# Patient Record
Sex: FEMALE | Race: WHITE | NOT HISPANIC OR LATINO | URBAN - METROPOLITAN AREA
[De-identification: names, ages, dates, MRNs, and addresses within clinical notes are randomized per-mention and may not be internally consistent; named-entity substitution may affect disease eponyms.]

---

## 2017-07-02 ENCOUNTER — EMERGENCY (EMERGENCY)
Facility: HOSPITAL | Age: 77
LOS: 0 days | Discharge: HOME | End: 2017-07-02
Attending: EMERGENCY MEDICINE | Admitting: EMERGENCY MEDICINE
Payer: MEDICARE

## 2017-07-02 VITALS
OXYGEN SATURATION: 100 % | RESPIRATION RATE: 17 BRPM | HEART RATE: 70 BPM | HEIGHT: 62 IN | WEIGHT: 119.93 LBS | DIASTOLIC BLOOD PRESSURE: 70 MMHG | SYSTOLIC BLOOD PRESSURE: 162 MMHG | TEMPERATURE: 98 F

## 2017-07-02 VITALS — HEART RATE: 62 BPM | SYSTOLIC BLOOD PRESSURE: 140 MMHG | DIASTOLIC BLOOD PRESSURE: 64 MMHG | TEMPERATURE: 98 F

## 2017-07-02 DIAGNOSIS — K92.1 MELENA: ICD-10-CM

## 2017-07-02 DIAGNOSIS — Z98.51 TUBAL LIGATION STATUS: Chronic | ICD-10-CM

## 2017-07-02 DIAGNOSIS — R10.9 UNSPECIFIED ABDOMINAL PAIN: ICD-10-CM

## 2017-07-02 DIAGNOSIS — K92.2 GASTROINTESTINAL HEMORRHAGE, UNSPECIFIED: ICD-10-CM

## 2017-07-02 DIAGNOSIS — R94.31 ABNORMAL ELECTROCARDIOGRAM [ECG] [EKG]: ICD-10-CM

## 2017-07-02 DIAGNOSIS — Z90.710 ACQUIRED ABSENCE OF BOTH CERVIX AND UTERUS: Chronic | ICD-10-CM

## 2017-07-02 LAB
ADD ON TEST-SPECIMEN IN LAB: SIGNIFICANT CHANGE UP
ALBUMIN SERPL ELPH-MCNC: 4.4 G/DL — SIGNIFICANT CHANGE UP (ref 3.3–5)
ALP SERPL-CCNC: 44 U/L — SIGNIFICANT CHANGE UP (ref 40–120)
ALT FLD-CCNC: 27 U/L — SIGNIFICANT CHANGE UP (ref 12–78)
ANION GAP SERPL CALC-SCNC: 12 MMOL/L — SIGNIFICANT CHANGE UP (ref 5–17)
APTT BLD: 26 SEC — LOW (ref 27.5–37.4)
AST SERPL-CCNC: 18 U/L — SIGNIFICANT CHANGE UP (ref 15–37)
BASOPHILS # BLD AUTO: 0 K/UL — SIGNIFICANT CHANGE UP (ref 0–0.2)
BASOPHILS NFR BLD AUTO: 0.3 % — SIGNIFICANT CHANGE UP (ref 0–2)
BILIRUB SERPL-MCNC: 0.3 MG/DL — SIGNIFICANT CHANGE UP (ref 0.2–1.2)
BLD GP AB SCN SERPL QL: SIGNIFICANT CHANGE UP
BUN SERPL-MCNC: 16 MG/DL — SIGNIFICANT CHANGE UP (ref 7–23)
CALCIUM SERPL-MCNC: 9.7 MG/DL — SIGNIFICANT CHANGE UP (ref 8.5–10.1)
CHLORIDE SERPL-SCNC: 98 MMOL/L — SIGNIFICANT CHANGE UP (ref 96–108)
CO2 SERPL-SCNC: 23 MMOL/L — SIGNIFICANT CHANGE UP (ref 22–31)
CREAT SERPL-MCNC: 0.94 MG/DL — SIGNIFICANT CHANGE UP (ref 0.5–1.3)
EOSINOPHIL # BLD AUTO: 0 K/UL — SIGNIFICANT CHANGE UP (ref 0–0.5)
EOSINOPHIL NFR BLD AUTO: 0.1 % — SIGNIFICANT CHANGE UP (ref 0–6)
GLUCOSE SERPL-MCNC: 153 MG/DL — HIGH (ref 70–99)
HCT VFR BLD CALC: 38.8 % — SIGNIFICANT CHANGE UP (ref 34.5–45)
HGB BLD-MCNC: 13.2 G/DL — SIGNIFICANT CHANGE UP (ref 11.5–15.5)
INR BLD: 1.02 RATIO — SIGNIFICANT CHANGE UP (ref 0.88–1.16)
LIDOCAIN IGE QN: 111 U/L — SIGNIFICANT CHANGE UP (ref 73–393)
LYMPHOCYTES # BLD AUTO: 1.3 K/UL — SIGNIFICANT CHANGE UP (ref 1–3.3)
LYMPHOCYTES # BLD AUTO: 9.6 % — LOW (ref 13–44)
MCHC RBC-ENTMCNC: 29.4 PG — SIGNIFICANT CHANGE UP (ref 27–34)
MCHC RBC-ENTMCNC: 34 GM/DL — SIGNIFICANT CHANGE UP (ref 32–36)
MCV RBC AUTO: 86.6 FL — SIGNIFICANT CHANGE UP (ref 80–100)
MONOCYTES # BLD AUTO: 0.4 K/UL — SIGNIFICANT CHANGE UP (ref 0–0.9)
MONOCYTES NFR BLD AUTO: 2.9 % — SIGNIFICANT CHANGE UP (ref 2–14)
NEUTROPHILS # BLD AUTO: 11.7 K/UL — HIGH (ref 1.8–7.4)
NEUTROPHILS NFR BLD AUTO: 87.1 % — HIGH (ref 43–77)
PLATELET # BLD AUTO: 368 K/UL — SIGNIFICANT CHANGE UP (ref 150–400)
POTASSIUM SERPL-MCNC: 3.4 MMOL/L — LOW (ref 3.5–5.3)
POTASSIUM SERPL-SCNC: 3.4 MMOL/L — LOW (ref 3.5–5.3)
PROT SERPL-MCNC: 7.8 GM/DL — SIGNIFICANT CHANGE UP (ref 6–8.3)
PROTHROM AB SERPL-ACNC: 11 SEC — SIGNIFICANT CHANGE UP (ref 9.8–12.7)
RBC # BLD: 4.48 M/UL — SIGNIFICANT CHANGE UP (ref 3.8–5.2)
RBC # FLD: 12.3 % — SIGNIFICANT CHANGE UP (ref 10.3–14.5)
SODIUM SERPL-SCNC: 133 MMOL/L — LOW (ref 135–145)
TROPONIN I SERPL-MCNC: <0.015 NG/ML — SIGNIFICANT CHANGE UP (ref 0.01–0.04)
TYPE + AB SCN PNL BLD: SIGNIFICANT CHANGE UP
WBC # BLD: 13.4 K/UL — HIGH (ref 3.8–10.5)
WBC # FLD AUTO: 13.4 K/UL — HIGH (ref 3.8–10.5)

## 2017-07-02 PROCEDURE — 99284 EMERGENCY DEPT VISIT MOD MDM: CPT | Mod: 25

## 2017-07-02 PROCEDURE — 93010 ELECTROCARDIOGRAM REPORT: CPT

## 2017-07-02 NOTE — ED ADULT NURSE NOTE - OBJECTIVE STATEMENT
Pt states she took a laxative (Ducolax) last night and woke up this morning with abdominal pain and noticed blood in her stool. Pt states she is being followed by a GI doctor for precancerous cells in her colon. Pt is alert and oriented x4. Denies nausea, abd distended and soft

## 2017-07-02 NOTE — ED PROVIDER NOTE - OBJECTIVE STATEMENT
77 yo pt with abdominal pain and blood in stool.  Pt took bisacodyl yesterday, because she felt that she did not fully empty.  over the night and into the morning pt with cramping.  Pt had large BM and notice some bloods streaks.  Pt now feeling better with IV fluid.  Pt not on blood thinner.  No travel, no sick contacts.

## 2017-07-02 NOTE — ED PROVIDER NOTE - CARE PLAN
Principal Discharge DX:	Abdominal pain, unspecified location  Secondary Diagnosis:	Gastrointestinal hemorrhage, unspecified gastrointestinal hemorrhage type

## 2017-07-02 NOTE — ED PROVIDER NOTE - MEDICAL DECISION MAKING DETAILS
75 yo pt with abd cramp after taking bisacodyl for constipation.  pt notice small amounts of blood.  Pt now feeling better after hydration and would like to be d/c.

## 2017-07-02 NOTE — ED ADULT TRIAGE NOTE - CHIEF COMPLAINT QUOTE
pt took a laxative last night and noticed blood in her stool this morning along with abdominal cramping.

## 2017-07-02 NOTE — ED PROVIDER NOTE - PROGRESS NOTE DETAILS
Attending Garcia, reviewed with pt results of labs.  Pt is feeling better - abd soft/nt/nd.  offered pt to check rectal exam and pt declined.  Pt feeling much better and would like to be d./c at this point.  No urinary symptoms.  Will cancel u/a

## 2017-10-05 ENCOUNTER — APPOINTMENT (OUTPATIENT)
Dept: PULMONOLOGY | Facility: CLINIC | Age: 77
End: 2017-10-05
Payer: MEDICARE

## 2017-10-05 VITALS
TEMPERATURE: 98.6 F | SYSTOLIC BLOOD PRESSURE: 140 MMHG | BODY MASS INDEX: 20.8 KG/M2 | HEIGHT: 62 IN | WEIGHT: 113 LBS | HEART RATE: 79 BPM | RESPIRATION RATE: 16 BRPM | DIASTOLIC BLOOD PRESSURE: 90 MMHG

## 2017-10-05 DIAGNOSIS — F51.04 PSYCHOPHYSIOLOGIC INSOMNIA: ICD-10-CM

## 2017-10-05 DIAGNOSIS — F51.01 PRIMARY INSOMNIA: ICD-10-CM

## 2017-10-05 DIAGNOSIS — Z86.79 PERSONAL HISTORY OF OTHER DISEASES OF THE CIRCULATORY SYSTEM: ICD-10-CM

## 2017-10-05 PROCEDURE — 99205 OFFICE O/P NEW HI 60 MIN: CPT | Mod: GC

## 2017-10-05 RX ORDER — TRAZODONE HYDROCHLORIDE 50 MG/1
50 TABLET ORAL
Qty: 30 | Refills: 3 | Status: COMPLETED | COMMUNITY
Start: 2017-10-05 | End: 2017-10-05

## 2017-10-06 PROBLEM — F51.04 CHRONIC INSOMNIA: Status: ACTIVE | Noted: 2017-10-06

## 2017-10-06 RX ORDER — FENOFIBRATE 160 MG/1
160 TABLET ORAL
Qty: 90 | Refills: 0 | Status: COMPLETED | COMMUNITY
Start: 2017-03-20

## 2017-10-06 RX ORDER — ZOLPIDEM TARTRATE 5 MG/1
5 TABLET ORAL
Qty: 30 | Refills: 0 | Status: COMPLETED | COMMUNITY
Start: 2017-08-24

## 2017-10-06 RX ORDER — TEMAZEPAM 30 MG/1
30 CAPSULE ORAL
Qty: 30 | Refills: 0 | Status: COMPLETED | COMMUNITY
Start: 2017-09-12

## 2017-10-06 RX ORDER — QUETIAPINE FUMARATE 25 MG/1
25 TABLET ORAL
Qty: 60 | Refills: 0 | Status: COMPLETED | COMMUNITY
Start: 2017-08-30

## 2017-10-06 RX ORDER — ESZOPICLONE 3 MG/1
3 TABLET, FILM COATED ORAL
Qty: 30 | Refills: 0 | Status: COMPLETED | COMMUNITY
Start: 2017-09-11

## 2017-10-06 RX ORDER — LISINOPRIL 10 MG/1
10 TABLET ORAL
Qty: 30 | Refills: 0 | Status: COMPLETED | COMMUNITY
Start: 2017-08-19

## 2017-10-06 RX ORDER — NITROFURANTOIN (MONOHYDRATE/MACROCRYSTALS) 25; 75 MG/1; MG/1
100 CAPSULE ORAL
Qty: 20 | Refills: 0 | Status: COMPLETED | COMMUNITY
Start: 2017-10-03

## 2017-10-06 RX ORDER — ALPRAZOLAM 0.5 MG/1
0.5 TABLET ORAL
Qty: 30 | Refills: 0 | Status: COMPLETED | COMMUNITY
Start: 2017-08-21

## 2017-11-28 ENCOUNTER — RX RENEWAL (OUTPATIENT)
Age: 77
End: 2017-11-28

## 2017-11-28 RX ORDER — ZOLPIDEM TARTRATE 5 MG/1
5 TABLET ORAL
Qty: 30 | Refills: 2 | Status: DISCONTINUED | COMMUNITY
Start: 2017-10-11 | End: 2017-11-28

## 2017-12-07 ENCOUNTER — APPOINTMENT (OUTPATIENT)
Dept: PULMONOLOGY | Facility: CLINIC | Age: 77
End: 2017-12-07

## 2018-01-10 ENCOUNTER — APPOINTMENT (OUTPATIENT)
Dept: PULMONOLOGY | Facility: CLINIC | Age: 78
End: 2018-01-10
Payer: MEDICARE

## 2018-01-10 VITALS
SYSTOLIC BLOOD PRESSURE: 130 MMHG | WEIGHT: 116 LBS | OXYGEN SATURATION: 98 % | HEART RATE: 84 BPM | RESPIRATION RATE: 18 BRPM | HEIGHT: 62 IN | DIASTOLIC BLOOD PRESSURE: 80 MMHG | TEMPERATURE: 97.5 F | BODY MASS INDEX: 21.35 KG/M2

## 2018-01-10 PROCEDURE — 99215 OFFICE O/P EST HI 40 MIN: CPT | Mod: GC

## 2018-01-13 ENCOUNTER — TRANSCRIPTION ENCOUNTER (OUTPATIENT)
Age: 78
End: 2018-01-13

## 2018-01-17 ENCOUNTER — APPOINTMENT (OUTPATIENT)
Dept: PULMONOLOGY | Facility: CLINIC | Age: 78
End: 2018-01-17

## 2018-02-22 ENCOUNTER — APPOINTMENT (OUTPATIENT)
Dept: PULMONOLOGY | Facility: CLINIC | Age: 78
End: 2018-02-22

## 2018-03-21 ENCOUNTER — APPOINTMENT (OUTPATIENT)
Dept: PULMONOLOGY | Facility: CLINIC | Age: 78
End: 2018-03-21

## 2018-09-23 ENCOUNTER — EMERGENCY (EMERGENCY)
Facility: HOSPITAL | Age: 78
LOS: 1 days | Discharge: HOME | End: 2018-09-23
Attending: EMERGENCY MEDICINE
Payer: MEDICARE

## 2018-09-23 VITALS
SYSTOLIC BLOOD PRESSURE: 157 MMHG | RESPIRATION RATE: 14 BRPM | HEART RATE: 68 BPM | DIASTOLIC BLOOD PRESSURE: 77 MMHG | TEMPERATURE: 98 F | OXYGEN SATURATION: 100 %

## 2018-09-23 VITALS
WEIGHT: 113.98 LBS | OXYGEN SATURATION: 99 % | HEIGHT: 62 IN | TEMPERATURE: 98 F | DIASTOLIC BLOOD PRESSURE: 92 MMHG | HEART RATE: 74 BPM | SYSTOLIC BLOOD PRESSURE: 179 MMHG | RESPIRATION RATE: 16 BRPM

## 2018-09-23 DIAGNOSIS — Z90.710 ACQUIRED ABSENCE OF BOTH CERVIX AND UTERUS: Chronic | ICD-10-CM

## 2018-09-23 DIAGNOSIS — Z98.51 TUBAL LIGATION STATUS: Chronic | ICD-10-CM

## 2018-09-23 LAB
ALBUMIN SERPL ELPH-MCNC: 3.9 G/DL — SIGNIFICANT CHANGE UP (ref 3.3–5)
ALP SERPL-CCNC: 75 U/L — SIGNIFICANT CHANGE UP (ref 40–120)
ALT FLD-CCNC: 42 U/L — SIGNIFICANT CHANGE UP (ref 12–78)
ANION GAP SERPL CALC-SCNC: 8 MMOL/L — SIGNIFICANT CHANGE UP (ref 5–17)
APPEARANCE UR: ABNORMAL
AST SERPL-CCNC: 36 U/L — SIGNIFICANT CHANGE UP (ref 15–37)
BASOPHILS # BLD AUTO: 0.03 K/UL — SIGNIFICANT CHANGE UP (ref 0–0.2)
BASOPHILS NFR BLD AUTO: 0.3 % — SIGNIFICANT CHANGE UP (ref 0–2)
BILIRUB SERPL-MCNC: 0.5 MG/DL — SIGNIFICANT CHANGE UP (ref 0.2–1.2)
BILIRUB UR-MCNC: ABNORMAL
BUN SERPL-MCNC: 12 MG/DL — SIGNIFICANT CHANGE UP (ref 7–23)
CALCIUM SERPL-MCNC: 9.5 MG/DL — SIGNIFICANT CHANGE UP (ref 8.5–10.1)
CHLORIDE SERPL-SCNC: 94 MMOL/L — LOW (ref 96–108)
CO2 SERPL-SCNC: 26 MMOL/L — SIGNIFICANT CHANGE UP (ref 22–31)
COLOR SPEC: YELLOW — SIGNIFICANT CHANGE UP
CREAT SERPL-MCNC: 0.57 MG/DL — SIGNIFICANT CHANGE UP (ref 0.5–1.3)
DIFF PNL FLD: ABNORMAL
EOSINOPHIL # BLD AUTO: 0.03 K/UL — SIGNIFICANT CHANGE UP (ref 0–0.5)
EOSINOPHIL NFR BLD AUTO: 0.3 % — SIGNIFICANT CHANGE UP (ref 0–6)
GLUCOSE SERPL-MCNC: 106 MG/DL — HIGH (ref 70–99)
GLUCOSE UR QL: NEGATIVE — SIGNIFICANT CHANGE UP
HCT VFR BLD CALC: 39.1 % — SIGNIFICANT CHANGE UP (ref 34.5–45)
HGB BLD-MCNC: 13.9 G/DL — SIGNIFICANT CHANGE UP (ref 11.5–15.5)
IMM GRANULOCYTES NFR BLD AUTO: 0.3 % — SIGNIFICANT CHANGE UP (ref 0–1.5)
KETONES UR-MCNC: NEGATIVE — SIGNIFICANT CHANGE UP
LACTATE SERPL-SCNC: 1.4 MMOL/L — SIGNIFICANT CHANGE UP (ref 0.7–2)
LEUKOCYTE ESTERASE UR-ACNC: ABNORMAL
LYMPHOCYTES # BLD AUTO: 2.73 K/UL — SIGNIFICANT CHANGE UP (ref 1–3.3)
LYMPHOCYTES # BLD AUTO: 26.3 % — SIGNIFICANT CHANGE UP (ref 13–44)
MCHC RBC-ENTMCNC: 30.5 PG — SIGNIFICANT CHANGE UP (ref 27–34)
MCHC RBC-ENTMCNC: 35.5 GM/DL — SIGNIFICANT CHANGE UP (ref 32–36)
MCV RBC AUTO: 85.9 FL — SIGNIFICANT CHANGE UP (ref 80–100)
MONOCYTES # BLD AUTO: 0.61 K/UL — SIGNIFICANT CHANGE UP (ref 0–0.9)
MONOCYTES NFR BLD AUTO: 5.9 % — SIGNIFICANT CHANGE UP (ref 2–14)
NEUTROPHILS # BLD AUTO: 6.96 K/UL — SIGNIFICANT CHANGE UP (ref 1.8–7.4)
NEUTROPHILS NFR BLD AUTO: 66.9 % — SIGNIFICANT CHANGE UP (ref 43–77)
NITRITE UR-MCNC: POSITIVE
PH UR: 7 — SIGNIFICANT CHANGE UP (ref 5–8)
PLATELET # BLD AUTO: 359 K/UL — SIGNIFICANT CHANGE UP (ref 150–400)
POTASSIUM SERPL-MCNC: 3.7 MMOL/L — SIGNIFICANT CHANGE UP (ref 3.5–5.3)
POTASSIUM SERPL-SCNC: 3.7 MMOL/L — SIGNIFICANT CHANGE UP (ref 3.5–5.3)
PROT SERPL-MCNC: 7.9 G/DL — SIGNIFICANT CHANGE UP (ref 6–8.3)
PROT UR-MCNC: 25 MG/DL
RBC # BLD: 4.55 M/UL — SIGNIFICANT CHANGE UP (ref 3.8–5.2)
RBC # FLD: 12.8 % — SIGNIFICANT CHANGE UP (ref 10.3–14.5)
SODIUM SERPL-SCNC: 128 MMOL/L — LOW (ref 135–145)
SP GR SPEC: 1.01 — SIGNIFICANT CHANGE UP (ref 1.01–1.02)
UROBILINOGEN FLD QL: 1
WBC # BLD: 10.39 K/UL — SIGNIFICANT CHANGE UP (ref 3.8–10.5)
WBC # FLD AUTO: 10.39 K/UL — SIGNIFICANT CHANGE UP (ref 3.8–10.5)

## 2018-09-23 PROCEDURE — 81001 URINALYSIS AUTO W/SCOPE: CPT

## 2018-09-23 PROCEDURE — 87086 URINE CULTURE/COLONY COUNT: CPT

## 2018-09-23 PROCEDURE — 99284 EMERGENCY DEPT VISIT MOD MDM: CPT

## 2018-09-23 PROCEDURE — 80053 COMPREHEN METABOLIC PANEL: CPT

## 2018-09-23 PROCEDURE — 99284 EMERGENCY DEPT VISIT MOD MDM: CPT | Mod: 25

## 2018-09-23 PROCEDURE — 74176 CT ABD & PELVIS W/O CONTRAST: CPT | Mod: 26

## 2018-09-23 PROCEDURE — 96375 TX/PRO/DX INJ NEW DRUG ADDON: CPT

## 2018-09-23 PROCEDURE — 85027 COMPLETE CBC AUTOMATED: CPT

## 2018-09-23 PROCEDURE — 36415 COLL VENOUS BLD VENIPUNCTURE: CPT

## 2018-09-23 PROCEDURE — 96365 THER/PROPH/DIAG IV INF INIT: CPT

## 2018-09-23 PROCEDURE — 87040 BLOOD CULTURE FOR BACTERIA: CPT

## 2018-09-23 PROCEDURE — 74176 CT ABD & PELVIS W/O CONTRAST: CPT

## 2018-09-23 PROCEDURE — 83605 ASSAY OF LACTIC ACID: CPT

## 2018-09-23 RX ORDER — CEFUROXIME AXETIL 250 MG
1 TABLET ORAL
Qty: 14 | Refills: 0
Start: 2018-09-23 | End: 2018-09-29

## 2018-09-23 RX ORDER — SODIUM CHLORIDE 9 MG/ML
1000 INJECTION INTRAMUSCULAR; INTRAVENOUS; SUBCUTANEOUS ONCE
Qty: 0 | Refills: 0 | Status: COMPLETED | OUTPATIENT
Start: 2018-09-23 | End: 2018-09-23

## 2018-09-23 RX ORDER — ONDANSETRON 8 MG/1
4 TABLET, FILM COATED ORAL ONCE
Qty: 0 | Refills: 0 | Status: COMPLETED | OUTPATIENT
Start: 2018-09-23 | End: 2018-09-23

## 2018-09-23 RX ORDER — PHENAZOPYRIDINE HCL 100 MG
2 TABLET ORAL
Qty: 12 | Refills: 0
Start: 2018-09-23 | End: 2018-09-24

## 2018-09-23 RX ORDER — CEFTRIAXONE 500 MG/1
1 INJECTION, POWDER, FOR SOLUTION INTRAMUSCULAR; INTRAVENOUS ONCE
Qty: 0 | Refills: 0 | Status: COMPLETED | OUTPATIENT
Start: 2018-09-23 | End: 2018-09-23

## 2018-09-23 RX ADMIN — SODIUM CHLORIDE 1000 MILLILITER(S): 9 INJECTION INTRAMUSCULAR; INTRAVENOUS; SUBCUTANEOUS at 20:34

## 2018-09-23 RX ADMIN — CEFTRIAXONE 1 GRAM(S): 500 INJECTION, POWDER, FOR SOLUTION INTRAMUSCULAR; INTRAVENOUS at 23:08

## 2018-09-23 RX ADMIN — CEFTRIAXONE 100 GRAM(S): 500 INJECTION, POWDER, FOR SOLUTION INTRAMUSCULAR; INTRAVENOUS at 22:37

## 2018-09-23 RX ADMIN — ONDANSETRON 4 MILLIGRAM(S): 8 TABLET, FILM COATED ORAL at 20:33

## 2018-09-23 RX ADMIN — SODIUM CHLORIDE 1000 MILLILITER(S): 9 INJECTION INTRAMUSCULAR; INTRAVENOUS; SUBCUTANEOUS at 21:34

## 2018-09-23 NOTE — ED PROVIDER NOTE - ATTENDING CONTRIBUTION TO CARE
pt is a 79 yo f who has hx of recurrent uti followed by dr canela.  was seen in urgent care for uti sx started on abx, but called and told to dc them after 2 days because cultures were negative. she presents with severe bladder spasms, pain urgency frequency causing abd pain  no fever no chills no cp no sob  hx of depression anxiety htn sp hyst gb pmd dr eldon decker no allergies no t a smoker or drinker  eval  wd wn w female nad  heent cor lungs chest back normal  abd mild suprapubic pain to palp no guard rebound  ext neuro normal   skin normal  plan ro uti ro mass or other causes of abd pain in female wliekly to be uti or pyelo  abx refer to urology

## 2018-09-23 NOTE — ED ADULT NURSE NOTE - PMH
Cholelithiasis with acute cholecystitis    Diverticulosis    Hiatal hernia    Hypertriglyceridemia    No pertinent past medical history    OAB (overactive bladder)

## 2018-09-23 NOTE — ED PROVIDER NOTE - PROGRESS NOTE DETAILS
spoke with Dr Blanco, agreed with plan of labs and ct advised if any concerns call him back patient states she has 3 days of ceftin from 5 day rx that stopped on thursday, rx for ceftin for 7 days sent to pharmacy, rx for pyridium sent , advised follow up with Dr Lucero, call tomorrow to arrange follow up , any concerns return to ed patient states she has 3 days of ceftin from 5 day rx that stopped on thursday, rx for ceftin for 7 days sent to pharmacy, rx for pyridium sent , advised follow up with Dr Lucero, call tomorrow to arrange follow up , any concerns return to ed, copy of results given Patient feeling better.  Results of CT discussed and copies given.  Will f/u with urology.

## 2018-09-23 NOTE — ED PROVIDER NOTE - OBJECTIVE STATEMENT
78 y female presents with urinary frequency, urgency, was seen in urgent care on tuesday, was prescribed ceftin and pyridium, symptoms improved, was called on thursday by urgent care, was told she does not have infection in urine to stop meds prescribed.  states symptoms returned today took 1 pyridium,  today felt chills and nausea, no vomiting, no back pain. 78 y female presents with urinary frequency, urgency, was seen in urgent care on tuesday, was prescribed ceftin and pyridium, symptoms improved, was called on thursday by urgent care, was told she does not have infection in urine to stop meds prescribed.  states symptoms returned today took 1 pyridium,  today felt chills and nausea, no vomiting, no flank pain. states she has hx of overactive bladder, Urologist Dr Lucero 78 y female presents with urinary frequency, urgency, was seen in urgent care on tuesday, was prescribed ceftin and pyridium, symptoms improved, was called on thursday by urgent care, was told she does not have infection in urine to stop meds prescribed.  states symptoms returned today took 1 pyridium,  today felt chills and nausea, no vomiting, no flank pain. states she has hx of overactive bladder, Urologist Dr Lucero  PMH:Cholelithiasis with acute cholecystitis    Diverticulosis    Hiatal hernia    Hypertriglyceridemia    No pertinent past medical history    OAB (overactive bladder)

## 2018-09-23 NOTE — ED ADULT NURSE NOTE - OBJECTIVE STATEMENT
78 yr old female presents to the ED with c/o frequent urination, burning, and suprapubic cramping. A+O x 4. ambulatory. States she has had these symptoms since tuesday 9/18/18. Saw MD and was told she did not have a UTI. antibiotics given prophylactically but stopped taking them on thursday 9/20/18. Started pyridium today. Urine orange but clear. afebrile. VSS.

## 2018-09-24 LAB
CULTURE RESULTS: SIGNIFICANT CHANGE UP
SPECIMEN SOURCE: SIGNIFICANT CHANGE UP

## 2018-09-29 LAB
CULTURE RESULTS: SIGNIFICANT CHANGE UP
CULTURE RESULTS: SIGNIFICANT CHANGE UP
SPECIMEN SOURCE: SIGNIFICANT CHANGE UP
SPECIMEN SOURCE: SIGNIFICANT CHANGE UP

## 2019-07-09 ENCOUNTER — EMERGENCY (EMERGENCY)
Facility: HOSPITAL | Age: 79
LOS: 1 days | Discharge: HOME | End: 2019-07-09
Attending: EMERGENCY MEDICINE | Admitting: EMERGENCY MEDICINE
Payer: MEDICARE

## 2019-07-09 VITALS
TEMPERATURE: 98 F | HEART RATE: 68 BPM | RESPIRATION RATE: 17 BRPM | DIASTOLIC BLOOD PRESSURE: 72 MMHG | OXYGEN SATURATION: 98 % | SYSTOLIC BLOOD PRESSURE: 144 MMHG | WEIGHT: 113.1 LBS | HEIGHT: 62 IN

## 2019-07-09 VITALS
OXYGEN SATURATION: 98 % | RESPIRATION RATE: 18 BRPM | TEMPERATURE: 98 F | SYSTOLIC BLOOD PRESSURE: 128 MMHG | DIASTOLIC BLOOD PRESSURE: 62 MMHG | HEART RATE: 60 BPM

## 2019-07-09 DIAGNOSIS — Z98.51 TUBAL LIGATION STATUS: Chronic | ICD-10-CM

## 2019-07-09 DIAGNOSIS — Z90.710 ACQUIRED ABSENCE OF BOTH CERVIX AND UTERUS: Chronic | ICD-10-CM

## 2019-07-09 LAB
ALBUMIN SERPL ELPH-MCNC: 3.5 G/DL — SIGNIFICANT CHANGE UP (ref 3.3–5)
ALP SERPL-CCNC: 71 U/L — SIGNIFICANT CHANGE UP (ref 40–120)
ALT FLD-CCNC: 26 U/L — SIGNIFICANT CHANGE UP (ref 12–78)
ANION GAP SERPL CALC-SCNC: 7 MMOL/L — SIGNIFICANT CHANGE UP (ref 5–17)
APPEARANCE UR: CLEAR — SIGNIFICANT CHANGE UP
AST SERPL-CCNC: 20 U/L — SIGNIFICANT CHANGE UP (ref 15–37)
BACTERIA # UR AUTO: ABNORMAL
BASOPHILS # BLD AUTO: 0.02 K/UL — SIGNIFICANT CHANGE UP (ref 0–0.2)
BASOPHILS NFR BLD AUTO: 0.3 % — SIGNIFICANT CHANGE UP (ref 0–2)
BILIRUB SERPL-MCNC: 0.3 MG/DL — SIGNIFICANT CHANGE UP (ref 0.2–1.2)
BILIRUB UR-MCNC: NEGATIVE — SIGNIFICANT CHANGE UP
BUN SERPL-MCNC: 16 MG/DL — SIGNIFICANT CHANGE UP (ref 7–23)
CALCIUM SERPL-MCNC: 8.5 MG/DL — SIGNIFICANT CHANGE UP (ref 8.5–10.1)
CHLORIDE SERPL-SCNC: 93 MMOL/L — LOW (ref 96–108)
CO2 SERPL-SCNC: 28 MMOL/L — SIGNIFICANT CHANGE UP (ref 22–31)
COLOR SPEC: YELLOW — SIGNIFICANT CHANGE UP
CREAT SERPL-MCNC: 0.77 MG/DL — SIGNIFICANT CHANGE UP (ref 0.5–1.3)
DIFF PNL FLD: ABNORMAL
EOSINOPHIL # BLD AUTO: 0.1 K/UL — SIGNIFICANT CHANGE UP (ref 0–0.5)
EOSINOPHIL NFR BLD AUTO: 1.5 % — SIGNIFICANT CHANGE UP (ref 0–6)
EPI CELLS # UR: SIGNIFICANT CHANGE UP
GLUCOSE SERPL-MCNC: 100 MG/DL — HIGH (ref 70–99)
GLUCOSE UR QL: NEGATIVE — SIGNIFICANT CHANGE UP
HCT VFR BLD CALC: 26.2 % — LOW (ref 34.5–45)
HGB BLD-MCNC: 9.1 G/DL — LOW (ref 11.5–15.5)
IMM GRANULOCYTES NFR BLD AUTO: 0.3 % — SIGNIFICANT CHANGE UP (ref 0–1.5)
KETONES UR-MCNC: NEGATIVE — SIGNIFICANT CHANGE UP
LEUKOCYTE ESTERASE UR-ACNC: ABNORMAL
LIDOCAIN IGE QN: 213 U/L — SIGNIFICANT CHANGE UP (ref 73–393)
LYMPHOCYTES # BLD AUTO: 2.25 K/UL — SIGNIFICANT CHANGE UP (ref 1–3.3)
LYMPHOCYTES # BLD AUTO: 34.3 % — SIGNIFICANT CHANGE UP (ref 13–44)
MCHC RBC-ENTMCNC: 29.9 PG — SIGNIFICANT CHANGE UP (ref 27–34)
MCHC RBC-ENTMCNC: 34.7 GM/DL — SIGNIFICANT CHANGE UP (ref 32–36)
MCV RBC AUTO: 86.2 FL — SIGNIFICANT CHANGE UP (ref 80–100)
MONOCYTES # BLD AUTO: 0.57 K/UL — SIGNIFICANT CHANGE UP (ref 0–0.9)
MONOCYTES NFR BLD AUTO: 8.7 % — SIGNIFICANT CHANGE UP (ref 2–14)
NEUTROPHILS # BLD AUTO: 3.6 K/UL — SIGNIFICANT CHANGE UP (ref 1.8–7.4)
NEUTROPHILS NFR BLD AUTO: 54.9 % — SIGNIFICANT CHANGE UP (ref 43–77)
NITRITE UR-MCNC: NEGATIVE — SIGNIFICANT CHANGE UP
NRBC # BLD: 0 /100 WBCS — SIGNIFICANT CHANGE UP (ref 0–0)
PH UR: 6.5 — SIGNIFICANT CHANGE UP (ref 5–8)
PLATELET # BLD AUTO: 204 K/UL — SIGNIFICANT CHANGE UP (ref 150–400)
POTASSIUM SERPL-MCNC: 3.4 MMOL/L — LOW (ref 3.5–5.3)
POTASSIUM SERPL-SCNC: 3.4 MMOL/L — LOW (ref 3.5–5.3)
PROT SERPL-MCNC: 6.9 G/DL — SIGNIFICANT CHANGE UP (ref 6–8.3)
PROT UR-MCNC: NEGATIVE — SIGNIFICANT CHANGE UP
RBC # BLD: 3.04 M/UL — LOW (ref 3.8–5.2)
RBC # FLD: 12.7 % — SIGNIFICANT CHANGE UP (ref 10.3–14.5)
RBC CASTS # UR COMP ASSIST: SIGNIFICANT CHANGE UP /HPF (ref 0–4)
SODIUM SERPL-SCNC: 128 MMOL/L — LOW (ref 135–145)
SP GR SPEC: 1 — LOW (ref 1.01–1.02)
UROBILINOGEN FLD QL: NEGATIVE — SIGNIFICANT CHANGE UP
WBC # BLD: 6.56 K/UL — SIGNIFICANT CHANGE UP (ref 3.8–10.5)
WBC # FLD AUTO: 6.56 K/UL — SIGNIFICANT CHANGE UP (ref 3.8–10.5)
WBC UR QL: ABNORMAL

## 2019-07-09 PROCEDURE — 85027 COMPLETE CBC AUTOMATED: CPT

## 2019-07-09 PROCEDURE — 83690 ASSAY OF LIPASE: CPT

## 2019-07-09 PROCEDURE — 74177 CT ABD & PELVIS W/CONTRAST: CPT | Mod: 26

## 2019-07-09 PROCEDURE — 99284 EMERGENCY DEPT VISIT MOD MDM: CPT | Mod: 25

## 2019-07-09 PROCEDURE — 74177 CT ABD & PELVIS W/CONTRAST: CPT

## 2019-07-09 PROCEDURE — 81001 URINALYSIS AUTO W/SCOPE: CPT

## 2019-07-09 PROCEDURE — 96375 TX/PRO/DX INJ NEW DRUG ADDON: CPT

## 2019-07-09 PROCEDURE — 99284 EMERGENCY DEPT VISIT MOD MDM: CPT

## 2019-07-09 PROCEDURE — 96374 THER/PROPH/DIAG INJ IV PUSH: CPT | Mod: 59

## 2019-07-09 PROCEDURE — 80053 COMPREHEN METABOLIC PANEL: CPT

## 2019-07-09 PROCEDURE — 36415 COLL VENOUS BLD VENIPUNCTURE: CPT

## 2019-07-09 RX ORDER — CEFTRIAXONE 500 MG/1
1000 INJECTION, POWDER, FOR SOLUTION INTRAMUSCULAR; INTRAVENOUS ONCE
Refills: 0 | Status: COMPLETED | OUTPATIENT
Start: 2019-07-09 | End: 2019-07-09

## 2019-07-09 RX ORDER — FAMOTIDINE 10 MG/ML
20 INJECTION INTRAVENOUS ONCE
Refills: 0 | Status: COMPLETED | OUTPATIENT
Start: 2019-07-09 | End: 2019-07-09

## 2019-07-09 RX ORDER — KETOROLAC TROMETHAMINE 30 MG/ML
30 SYRINGE (ML) INJECTION ONCE
Refills: 0 | Status: DISCONTINUED | OUTPATIENT
Start: 2019-07-09 | End: 2019-07-09

## 2019-07-09 RX ORDER — CEFUROXIME AXETIL 250 MG
1 TABLET ORAL
Qty: 14 | Refills: 0
Start: 2019-07-09 | End: 2019-07-15

## 2019-07-09 RX ORDER — TRAMADOL HYDROCHLORIDE 50 MG/1
1 TABLET ORAL
Qty: 12 | Refills: 0
Start: 2019-07-09 | End: 2019-07-11

## 2019-07-09 RX ORDER — SODIUM CHLORIDE 9 MG/ML
1000 INJECTION INTRAMUSCULAR; INTRAVENOUS; SUBCUTANEOUS ONCE
Refills: 0 | Status: COMPLETED | OUTPATIENT
Start: 2019-07-09 | End: 2019-07-09

## 2019-07-09 RX ADMIN — SODIUM CHLORIDE 1000 MILLILITER(S): 9 INJECTION INTRAMUSCULAR; INTRAVENOUS; SUBCUTANEOUS at 03:03

## 2019-07-09 RX ADMIN — CEFTRIAXONE 100 MILLIGRAM(S): 500 INJECTION, POWDER, FOR SOLUTION INTRAMUSCULAR; INTRAVENOUS at 05:34

## 2019-07-09 RX ADMIN — FAMOTIDINE 20 MILLIGRAM(S): 10 INJECTION INTRAVENOUS at 03:45

## 2019-07-09 RX ADMIN — Medication 30 MILLIGRAM(S): at 05:34

## 2019-07-09 NOTE — ED PROVIDER NOTE - PROGRESS NOTE DETAILS
spoke with pt about ct results, states she has hx of bladder stone 6 months ago, was followed by dr canela, will be d/c home with abx, pain meds, return if any symtpoms worsen

## 2019-07-09 NOTE — ED ADULT NURSE NOTE - NSIMPLEMENTINTERV_GEN_ALL_ED
Implemented All Universal Safety Interventions:  [place] to call system. Call bell, personal items and telephone within reach. Instruct patient to call for assistance. Room bathroom lighting operational. Non-slip footwear when patient is off stretcher. Physically safe environment: no spills, clutter or unnecessary equipment. Stretcher in lowest position, wheels locked, appropriate side rails in place.

## 2019-07-09 NOTE — ED PROVIDER NOTE - OBJECTIVE STATEMENT
79yo female who presents with abda pin since last nite. pt c/o distension and bloating, no vomiting or diarrhea, no fever, chills, pt has been taking motrin and tylenol with no relef

## 2020-01-08 ENCOUNTER — EMERGENCY (EMERGENCY)
Facility: HOSPITAL | Age: 80
LOS: 1 days | Discharge: HOME | End: 2020-01-08
Attending: EMERGENCY MEDICINE | Admitting: EMERGENCY MEDICINE
Payer: MEDICARE

## 2020-01-08 VITALS
DIASTOLIC BLOOD PRESSURE: 86 MMHG | RESPIRATION RATE: 18 BRPM | HEART RATE: 91 BPM | TEMPERATURE: 98 F | WEIGHT: 115.96 LBS | SYSTOLIC BLOOD PRESSURE: 164 MMHG | HEIGHT: 62 IN | OXYGEN SATURATION: 98 %

## 2020-01-08 VITALS
DIASTOLIC BLOOD PRESSURE: 87 MMHG | TEMPERATURE: 98 F | OXYGEN SATURATION: 98 % | SYSTOLIC BLOOD PRESSURE: 149 MMHG | RESPIRATION RATE: 17 BRPM | HEART RATE: 87 BPM

## 2020-01-08 DIAGNOSIS — Z98.51 TUBAL LIGATION STATUS: Chronic | ICD-10-CM

## 2020-01-08 DIAGNOSIS — Z90.710 ACQUIRED ABSENCE OF BOTH CERVIX AND UTERUS: Chronic | ICD-10-CM

## 2020-01-08 LAB
ALBUMIN SERPL ELPH-MCNC: 4.4 G/DL — SIGNIFICANT CHANGE UP (ref 3.3–5)
ALP SERPL-CCNC: 72 U/L — SIGNIFICANT CHANGE UP (ref 40–120)
ALT FLD-CCNC: 38 U/L — SIGNIFICANT CHANGE UP (ref 12–78)
ANION GAP SERPL CALC-SCNC: 8 MMOL/L — SIGNIFICANT CHANGE UP (ref 5–17)
APPEARANCE UR: CLEAR — SIGNIFICANT CHANGE UP
AST SERPL-CCNC: 24 U/L — SIGNIFICANT CHANGE UP (ref 15–37)
BACTERIA # UR AUTO: ABNORMAL
BASOPHILS # BLD AUTO: 0.04 K/UL — SIGNIFICANT CHANGE UP (ref 0–0.2)
BASOPHILS NFR BLD AUTO: 0.4 % — SIGNIFICANT CHANGE UP (ref 0–2)
BILIRUB SERPL-MCNC: 0.3 MG/DL — SIGNIFICANT CHANGE UP (ref 0.2–1.2)
BILIRUB UR-MCNC: NEGATIVE — SIGNIFICANT CHANGE UP
BUN SERPL-MCNC: 12 MG/DL — SIGNIFICANT CHANGE UP (ref 7–23)
CALCIUM SERPL-MCNC: 9.9 MG/DL — SIGNIFICANT CHANGE UP (ref 8.5–10.1)
CHLORIDE SERPL-SCNC: 98 MMOL/L — SIGNIFICANT CHANGE UP (ref 96–108)
CO2 SERPL-SCNC: 26 MMOL/L — SIGNIFICANT CHANGE UP (ref 22–31)
COLOR SPEC: SIGNIFICANT CHANGE UP
CREAT SERPL-MCNC: 0.67 MG/DL — SIGNIFICANT CHANGE UP (ref 0.5–1.3)
DIFF PNL FLD: ABNORMAL
EOSINOPHIL # BLD AUTO: 0.1 K/UL — SIGNIFICANT CHANGE UP (ref 0–0.5)
EOSINOPHIL NFR BLD AUTO: 1 % — SIGNIFICANT CHANGE UP (ref 0–6)
EPI CELLS # UR: SIGNIFICANT CHANGE UP
GLUCOSE SERPL-MCNC: 104 MG/DL — HIGH (ref 70–99)
GLUCOSE UR QL: NEGATIVE — SIGNIFICANT CHANGE UP
HCT VFR BLD CALC: 41.1 % — SIGNIFICANT CHANGE UP (ref 34.5–45)
HGB BLD-MCNC: 14 G/DL — SIGNIFICANT CHANGE UP (ref 11.5–15.5)
IMM GRANULOCYTES NFR BLD AUTO: 0.2 % — SIGNIFICANT CHANGE UP (ref 0–1.5)
KETONES UR-MCNC: NEGATIVE — SIGNIFICANT CHANGE UP
LEUKOCYTE ESTERASE UR-ACNC: ABNORMAL
LIDOCAIN IGE QN: 114 U/L — SIGNIFICANT CHANGE UP (ref 73–393)
LYMPHOCYTES # BLD AUTO: 3.02 K/UL — SIGNIFICANT CHANGE UP (ref 1–3.3)
LYMPHOCYTES # BLD AUTO: 31.4 % — SIGNIFICANT CHANGE UP (ref 13–44)
MCHC RBC-ENTMCNC: 30.1 PG — SIGNIFICANT CHANGE UP (ref 27–34)
MCHC RBC-ENTMCNC: 34.1 GM/DL — SIGNIFICANT CHANGE UP (ref 32–36)
MCV RBC AUTO: 88.4 FL — SIGNIFICANT CHANGE UP (ref 80–100)
MONOCYTES # BLD AUTO: 0.57 K/UL — SIGNIFICANT CHANGE UP (ref 0–0.9)
MONOCYTES NFR BLD AUTO: 5.9 % — SIGNIFICANT CHANGE UP (ref 2–14)
NEUTROPHILS # BLD AUTO: 5.88 K/UL — SIGNIFICANT CHANGE UP (ref 1.8–7.4)
NEUTROPHILS NFR BLD AUTO: 61.1 % — SIGNIFICANT CHANGE UP (ref 43–77)
NITRITE UR-MCNC: NEGATIVE — SIGNIFICANT CHANGE UP
NRBC # BLD: 0 /100 WBCS — SIGNIFICANT CHANGE UP (ref 0–0)
PH UR: 7 — SIGNIFICANT CHANGE UP (ref 5–8)
PLATELET # BLD AUTO: 395 K/UL — SIGNIFICANT CHANGE UP (ref 150–400)
POTASSIUM SERPL-MCNC: 4.1 MMOL/L — SIGNIFICANT CHANGE UP (ref 3.5–5.3)
POTASSIUM SERPL-SCNC: 4.1 MMOL/L — SIGNIFICANT CHANGE UP (ref 3.5–5.3)
PROT SERPL-MCNC: 8.5 G/DL — HIGH (ref 6–8.3)
PROT UR-MCNC: NEGATIVE — SIGNIFICANT CHANGE UP
RBC # BLD: 4.65 M/UL — SIGNIFICANT CHANGE UP (ref 3.8–5.2)
RBC # FLD: 12.7 % — SIGNIFICANT CHANGE UP (ref 10.3–14.5)
RBC CASTS # UR COMP ASSIST: SIGNIFICANT CHANGE UP /HPF (ref 0–4)
SODIUM SERPL-SCNC: 132 MMOL/L — LOW (ref 135–145)
SP GR SPEC: 1 — LOW (ref 1.01–1.02)
UROBILINOGEN FLD QL: NEGATIVE — SIGNIFICANT CHANGE UP
WBC # BLD: 9.63 K/UL — SIGNIFICANT CHANGE UP (ref 3.8–10.5)
WBC # FLD AUTO: 9.63 K/UL — SIGNIFICANT CHANGE UP (ref 3.8–10.5)
WBC UR QL: SIGNIFICANT CHANGE UP

## 2020-01-08 PROCEDURE — 85027 COMPLETE CBC AUTOMATED: CPT

## 2020-01-08 PROCEDURE — 36415 COLL VENOUS BLD VENIPUNCTURE: CPT

## 2020-01-08 PROCEDURE — 74176 CT ABD & PELVIS W/O CONTRAST: CPT

## 2020-01-08 PROCEDURE — 83690 ASSAY OF LIPASE: CPT

## 2020-01-08 PROCEDURE — 80053 COMPREHEN METABOLIC PANEL: CPT

## 2020-01-08 PROCEDURE — 99284 EMERGENCY DEPT VISIT MOD MDM: CPT

## 2020-01-08 PROCEDURE — 87086 URINE CULTURE/COLONY COUNT: CPT

## 2020-01-08 PROCEDURE — 74176 CT ABD & PELVIS W/O CONTRAST: CPT | Mod: 26

## 2020-01-08 PROCEDURE — 81001 URINALYSIS AUTO W/SCOPE: CPT

## 2020-01-08 PROCEDURE — 96361 HYDRATE IV INFUSION ADD-ON: CPT

## 2020-01-08 PROCEDURE — 99284 EMERGENCY DEPT VISIT MOD MDM: CPT | Mod: 25

## 2020-01-08 PROCEDURE — 96365 THER/PROPH/DIAG IV INF INIT: CPT

## 2020-01-08 RX ORDER — CEFDINIR 250 MG/5ML
1 POWDER, FOR SUSPENSION ORAL
Qty: 6 | Refills: 0
Start: 2020-01-08 | End: 2020-01-10

## 2020-01-08 RX ORDER — CEFTRIAXONE 500 MG/1
1000 INJECTION, POWDER, FOR SOLUTION INTRAMUSCULAR; INTRAVENOUS ONCE
Refills: 0 | Status: COMPLETED | OUTPATIENT
Start: 2020-01-08 | End: 2020-01-08

## 2020-01-08 RX ORDER — OXYCODONE AND ACETAMINOPHEN 5; 325 MG/1; MG/1
1 TABLET ORAL ONCE
Refills: 0 | Status: DISCONTINUED | OUTPATIENT
Start: 2020-01-08 | End: 2020-01-08

## 2020-01-08 RX ORDER — SODIUM CHLORIDE 9 MG/ML
1000 INJECTION INTRAMUSCULAR; INTRAVENOUS; SUBCUTANEOUS ONCE
Refills: 0 | Status: COMPLETED | OUTPATIENT
Start: 2020-01-08 | End: 2020-01-08

## 2020-01-08 RX ADMIN — SODIUM CHLORIDE 1000 MILLILITER(S): 9 INJECTION INTRAMUSCULAR; INTRAVENOUS; SUBCUTANEOUS at 21:07

## 2020-01-08 RX ADMIN — CEFTRIAXONE 1000 MILLIGRAM(S): 500 INJECTION, POWDER, FOR SOLUTION INTRAMUSCULAR; INTRAVENOUS at 22:30

## 2020-01-08 RX ADMIN — CEFTRIAXONE 100 MILLIGRAM(S): 500 INJECTION, POWDER, FOR SOLUTION INTRAMUSCULAR; INTRAVENOUS at 22:00

## 2020-01-08 RX ADMIN — SODIUM CHLORIDE 1000 MILLILITER(S): 9 INJECTION INTRAMUSCULAR; INTRAVENOUS; SUBCUTANEOUS at 20:00

## 2020-01-08 RX ADMIN — OXYCODONE AND ACETAMINOPHEN 1 TABLET(S): 5; 325 TABLET ORAL at 22:18

## 2020-01-08 NOTE — ED PROVIDER NOTE - CARE PROVIDER_API CALL
Anselmo Lucero)  Urology  [address] [id number]  Phone: [phone]  Fax: [phone]  Follow Up Time:     Torin Fortune)  Internal Medicine  [address] [place] [address]  Phone: [phone]  Fax: [phone]  Follow Up Time:

## 2020-01-08 NOTE — ED PROVIDER NOTE - PATIENT PORTAL LINK FT
You can access the FollowMyHealth Patient Portal offered by [facility] by registering at the following website: [email or web address]. By joining [facility]’s FollowMyHealth portal, you will also be able to view your health information using other applications (apps) compatible with our system.

## 2020-01-08 NOTE — ED PROVIDER NOTE - CHPI ED SYMPTOMS NEG
no blood in stool/no chills/no diarrhea/no nausea/no vomiting/no abdominal distension/no hematuria/no fever

## 2020-01-08 NOTE — ED ADULT NURSE REASSESSMENT NOTE - NS ED NURSE REASSESS COMMENT FT1
Report received from Rosie FORMAN. Pt is taken for CT Renal stone hunt. Will ctm when back. Line and labs to be done.

## 2020-01-08 NOTE — ED PROVIDER NOTE - OBJECTIVE STATEMENT
78 yo F p/w been rx for a UTI x past ~ 2 weeks. Pt initially on Macrobid x 2-3 d, then changed to cipro. pt stopped cipro early due to side effects. Pt now was recently started few days ago on a cephalosporin but only took for 1 day as she wanted to "see if if was the right abx". Pt now with persistent suprapubic pain. No fever/chills. no n/v/d. no neck / back pain. no numb/ting/focal weak. No recent trauma. no agg/allev factors. no other inj or co.

## 2020-01-08 NOTE — ED ADULT TRIAGE NOTE - CHIEF COMPLAINT QUOTE
Patient c/o lower abdominal pain associated with nausea, she reports that she has a uti Patient c/o lower abdominal pain associated with nausea x 2 weeks she reports that she has a uti.

## 2020-01-08 NOTE — ED ADULT NURSE NOTE - OBJECTIVE STATEMENT
Received from Home with c/o abdominal pain with N. Reports having had UTI since Xmas and had been on ABT. No relief in pain and PMD advised to come to ER. AO4, Ambulatory. Son at bedside. Line and labs to be done. CT abdomen done.

## 2020-01-08 NOTE — ED PROVIDER NOTE - NSFOLLOWUPINSTRUCTIONS_ED_ALL_ED_FT
1) Follow-up with your Primary Medical Doctor or referred doctor. Call today / next business day for prompt follow-up.  2) Return to Emergency room for any worsening or persistent pain, weakness, fever, dizziness, passing out, difficulty breathing or any other concerning symptoms.  3) See attached instruction sheets for additional information, including information regarding signs and symptoms to look out for, reasons to seek immediate care and other important instructions.  4) Follow-up with Urology tomorrow as discussed  5) Continue Omnicef twice daily as prescribed

## 2020-01-08 NOTE — ED PROVIDER NOTE - ENMT, MLM
Airway patent, Nasal mucosa clear. Mouth with normal mucosa. Throat has no vesicles, no oropharyngeal exudates and uvula is midline. MM MOist. neck supple. no meningeal signs

## 2020-01-10 LAB
CULTURE RESULTS: NO GROWTH — SIGNIFICANT CHANGE UP
SPECIMEN SOURCE: SIGNIFICANT CHANGE UP

## 2020-01-24 ENCOUNTER — EMERGENCY (EMERGENCY)
Facility: HOSPITAL | Age: 80
LOS: 1 days | Discharge: HOME | End: 2020-01-24
Attending: EMERGENCY MEDICINE | Admitting: EMERGENCY MEDICINE
Payer: MEDICARE

## 2020-01-24 VITALS
RESPIRATION RATE: 16 BRPM | OXYGEN SATURATION: 99 % | WEIGHT: 160.06 LBS | TEMPERATURE: 98 F | HEART RATE: 75 BPM | DIASTOLIC BLOOD PRESSURE: 64 MMHG | SYSTOLIC BLOOD PRESSURE: 124 MMHG | HEIGHT: 62 IN

## 2020-01-24 VITALS
SYSTOLIC BLOOD PRESSURE: 139 MMHG | RESPIRATION RATE: 20 BRPM | OXYGEN SATURATION: 97 % | TEMPERATURE: 98 F | HEART RATE: 63 BPM | DIASTOLIC BLOOD PRESSURE: 66 MMHG

## 2020-01-24 DIAGNOSIS — Z90.710 ACQUIRED ABSENCE OF BOTH CERVIX AND UTERUS: Chronic | ICD-10-CM

## 2020-01-24 DIAGNOSIS — Z98.51 TUBAL LIGATION STATUS: Chronic | ICD-10-CM

## 2020-01-24 LAB
ALBUMIN SERPL ELPH-MCNC: 4.1 G/DL — SIGNIFICANT CHANGE UP (ref 3.3–5)
ALP SERPL-CCNC: 64 U/L — SIGNIFICANT CHANGE UP (ref 30–120)
ALT FLD-CCNC: 60 U/L DA — SIGNIFICANT CHANGE UP (ref 10–60)
ANION GAP SERPL CALC-SCNC: 10 MMOL/L — SIGNIFICANT CHANGE UP (ref 5–17)
APPEARANCE UR: CLEAR — SIGNIFICANT CHANGE UP
APTT BLD: 24.2 SEC — LOW (ref 28.5–37)
AST SERPL-CCNC: 32 U/L — SIGNIFICANT CHANGE UP (ref 10–40)
BACTERIA # UR AUTO: ABNORMAL
BASOPHILS # BLD AUTO: 0.03 K/UL — SIGNIFICANT CHANGE UP (ref 0–0.2)
BASOPHILS NFR BLD AUTO: 0.1 % — SIGNIFICANT CHANGE UP (ref 0–2)
BILIRUB SERPL-MCNC: 0.4 MG/DL — SIGNIFICANT CHANGE UP (ref 0.2–1.2)
BILIRUB UR-MCNC: NEGATIVE — SIGNIFICANT CHANGE UP
BUN SERPL-MCNC: 17 MG/DL — SIGNIFICANT CHANGE UP (ref 7–23)
CALCIUM SERPL-MCNC: 9 MG/DL — SIGNIFICANT CHANGE UP (ref 8.4–10.5)
CHLORIDE SERPL-SCNC: 94 MMOL/L — LOW (ref 96–108)
CO2 SERPL-SCNC: 24 MMOL/L — SIGNIFICANT CHANGE UP (ref 22–31)
COLOR SPEC: YELLOW — SIGNIFICANT CHANGE UP
CREAT SERPL-MCNC: 0.9 MG/DL — SIGNIFICANT CHANGE UP (ref 0.5–1.3)
DIFF PNL FLD: ABNORMAL
EOSINOPHIL # BLD AUTO: 0.01 K/UL — SIGNIFICANT CHANGE UP (ref 0–0.5)
EOSINOPHIL NFR BLD AUTO: 0 % — SIGNIFICANT CHANGE UP (ref 0–6)
EPI CELLS # UR: SIGNIFICANT CHANGE UP
GLUCOSE SERPL-MCNC: 129 MG/DL — HIGH (ref 70–99)
GLUCOSE UR QL: NEGATIVE MG/DL — SIGNIFICANT CHANGE UP
HCT VFR BLD CALC: 39.6 % — SIGNIFICANT CHANGE UP (ref 34.5–45)
HGB BLD-MCNC: 13.3 G/DL — SIGNIFICANT CHANGE UP (ref 11.5–15.5)
IMM GRANULOCYTES NFR BLD AUTO: 0.8 % — SIGNIFICANT CHANGE UP (ref 0–1.5)
INR BLD: 1.04 RATIO — SIGNIFICANT CHANGE UP (ref 0.88–1.16)
KETONES UR-MCNC: ABNORMAL
LACTATE SERPL-SCNC: 1.8 MMOL/L — SIGNIFICANT CHANGE UP (ref 0.7–2)
LEUKOCYTE ESTERASE UR-ACNC: ABNORMAL
LYMPHOCYTES # BLD AUTO: 1.54 K/UL — SIGNIFICANT CHANGE UP (ref 1–3.3)
LYMPHOCYTES # BLD AUTO: 7.6 % — LOW (ref 13–44)
MCHC RBC-ENTMCNC: 30 PG — SIGNIFICANT CHANGE UP (ref 27–34)
MCHC RBC-ENTMCNC: 33.6 GM/DL — SIGNIFICANT CHANGE UP (ref 32–36)
MCV RBC AUTO: 89.4 FL — SIGNIFICANT CHANGE UP (ref 80–100)
MONOCYTES # BLD AUTO: 0.82 K/UL — SIGNIFICANT CHANGE UP (ref 0–0.9)
MONOCYTES NFR BLD AUTO: 4.1 % — SIGNIFICANT CHANGE UP (ref 2–14)
NEUTROPHILS # BLD AUTO: 17.65 K/UL — HIGH (ref 1.8–7.4)
NEUTROPHILS NFR BLD AUTO: 87.4 % — HIGH (ref 43–77)
NITRITE UR-MCNC: NEGATIVE — SIGNIFICANT CHANGE UP
NRBC # BLD: 0 /100 WBCS — SIGNIFICANT CHANGE UP (ref 0–0)
PH UR: 6 — SIGNIFICANT CHANGE UP (ref 5–8)
PLATELET # BLD AUTO: 329 K/UL — SIGNIFICANT CHANGE UP (ref 150–400)
POTASSIUM SERPL-MCNC: 4 MMOL/L — SIGNIFICANT CHANGE UP (ref 3.5–5.3)
POTASSIUM SERPL-SCNC: 4 MMOL/L — SIGNIFICANT CHANGE UP (ref 3.5–5.3)
PROT SERPL-MCNC: 8.1 G/DL — SIGNIFICANT CHANGE UP (ref 6–8.3)
PROT UR-MCNC: 30 MG/DL
PROTHROM AB SERPL-ACNC: 11.6 SEC — SIGNIFICANT CHANGE UP (ref 10–12.9)
RBC # BLD: 4.43 M/UL — SIGNIFICANT CHANGE UP (ref 3.8–5.2)
RBC # FLD: 12.5 % — SIGNIFICANT CHANGE UP (ref 10.3–14.5)
RBC CASTS # UR COMP ASSIST: SIGNIFICANT CHANGE UP /HPF (ref 0–4)
SODIUM SERPL-SCNC: 128 MMOL/L — LOW (ref 135–145)
SP GR SPEC: 1.02 — SIGNIFICANT CHANGE UP (ref 1.01–1.02)
TROPONIN I SERPL-MCNC: 0 NG/ML — LOW (ref 0.02–0.06)
UROBILINOGEN FLD QL: NEGATIVE MG/DL — SIGNIFICANT CHANGE UP
WBC # BLD: 20.22 K/UL — HIGH (ref 3.8–10.5)
WBC # FLD AUTO: 20.22 K/UL — HIGH (ref 3.8–10.5)
WBC UR QL: SIGNIFICANT CHANGE UP

## 2020-01-24 PROCEDURE — 81001 URINALYSIS AUTO W/SCOPE: CPT

## 2020-01-24 PROCEDURE — 71045 X-RAY EXAM CHEST 1 VIEW: CPT

## 2020-01-24 PROCEDURE — 70450 CT HEAD/BRAIN W/O DYE: CPT

## 2020-01-24 PROCEDURE — 99284 EMERGENCY DEPT VISIT MOD MDM: CPT

## 2020-01-24 PROCEDURE — 72220 X-RAY EXAM SACRUM TAILBONE: CPT | Mod: 26

## 2020-01-24 PROCEDURE — 85730 THROMBOPLASTIN TIME PARTIAL: CPT

## 2020-01-24 PROCEDURE — 71045 X-RAY EXAM CHEST 1 VIEW: CPT | Mod: 26

## 2020-01-24 PROCEDURE — 84484 ASSAY OF TROPONIN QUANT: CPT

## 2020-01-24 PROCEDURE — 12004 RPR S/N/AX/GEN/TRK7.6-12.5CM: CPT

## 2020-01-24 PROCEDURE — 96361 HYDRATE IV INFUSION ADD-ON: CPT

## 2020-01-24 PROCEDURE — 90471 IMMUNIZATION ADMIN: CPT

## 2020-01-24 PROCEDURE — 72220 X-RAY EXAM SACRUM TAILBONE: CPT

## 2020-01-24 PROCEDURE — 93005 ELECTROCARDIOGRAM TRACING: CPT

## 2020-01-24 PROCEDURE — 90715 TDAP VACCINE 7 YRS/> IM: CPT

## 2020-01-24 PROCEDURE — 85610 PROTHROMBIN TIME: CPT

## 2020-01-24 PROCEDURE — 36415 COLL VENOUS BLD VENIPUNCTURE: CPT

## 2020-01-24 PROCEDURE — 70450 CT HEAD/BRAIN W/O DYE: CPT | Mod: 26

## 2020-01-24 PROCEDURE — 80053 COMPREHEN METABOLIC PANEL: CPT

## 2020-01-24 PROCEDURE — 87040 BLOOD CULTURE FOR BACTERIA: CPT

## 2020-01-24 PROCEDURE — 93010 ELECTROCARDIOGRAM REPORT: CPT

## 2020-01-24 PROCEDURE — 85027 COMPLETE CBC AUTOMATED: CPT

## 2020-01-24 PROCEDURE — 99284 EMERGENCY DEPT VISIT MOD MDM: CPT | Mod: 25

## 2020-01-24 PROCEDURE — 96360 HYDRATION IV INFUSION INIT: CPT

## 2020-01-24 PROCEDURE — 83605 ASSAY OF LACTIC ACID: CPT

## 2020-01-24 PROCEDURE — 87086 URINE CULTURE/COLONY COUNT: CPT

## 2020-01-24 RX ORDER — FENOFIBRATE,MICRONIZED 130 MG
0 CAPSULE ORAL
Qty: 0 | Refills: 0 | DISCHARGE

## 2020-01-24 RX ORDER — TETANUS TOXOID, REDUCED DIPHTHERIA TOXOID AND ACELLULAR PERTUSSIS VACCINE, ADSORBED 5; 2.5; 8; 8; 2.5 [IU]/.5ML; [IU]/.5ML; UG/.5ML; UG/.5ML; UG/.5ML
0.5 SUSPENSION INTRAMUSCULAR ONCE
Refills: 0 | Status: COMPLETED | OUTPATIENT
Start: 2020-01-24 | End: 2020-01-24

## 2020-01-24 RX ORDER — SODIUM CHLORIDE 9 MG/ML
1000 INJECTION INTRAMUSCULAR; INTRAVENOUS; SUBCUTANEOUS ONCE
Refills: 0 | Status: COMPLETED | OUTPATIENT
Start: 2020-01-24 | End: 2020-01-24

## 2020-01-24 RX ORDER — ALPRAZOLAM 0.25 MG
0 TABLET ORAL
Qty: 0 | Refills: 0 | DISCHARGE

## 2020-01-24 RX ORDER — SODIUM CHLORIDE 9 MG/ML
1550 INJECTION INTRAMUSCULAR; INTRAVENOUS; SUBCUTANEOUS ONCE
Refills: 0 | Status: COMPLETED | OUTPATIENT
Start: 2020-01-24 | End: 2020-01-24

## 2020-01-24 RX ADMIN — SODIUM CHLORIDE 1550 MILLILITER(S): 9 INJECTION INTRAMUSCULAR; INTRAVENOUS; SUBCUTANEOUS at 14:30

## 2020-01-24 RX ADMIN — SODIUM CHLORIDE 1000 MILLILITER(S): 9 INJECTION INTRAMUSCULAR; INTRAVENOUS; SUBCUTANEOUS at 13:25

## 2020-01-24 RX ADMIN — SODIUM CHLORIDE 1000 MILLILITER(S): 9 INJECTION INTRAMUSCULAR; INTRAVENOUS; SUBCUTANEOUS at 14:30

## 2020-01-24 RX ADMIN — TETANUS TOXOID, REDUCED DIPHTHERIA TOXOID AND ACELLULAR PERTUSSIS VACCINE, ADSORBED 0.5 MILLILITER(S): 5; 2.5; 8; 8; 2.5 SUSPENSION INTRAMUSCULAR at 15:57

## 2020-01-24 RX ADMIN — SODIUM CHLORIDE 1550 MILLILITER(S): 9 INJECTION INTRAMUSCULAR; INTRAVENOUS; SUBCUTANEOUS at 16:01

## 2020-01-24 NOTE — ED PROVIDER NOTE - INTERPRETATION
normal sinus rhythm, Normal axis, Normal TX interval and QRS complex. There are no acute ischemic ST or T-wave changes. abnormal/NSSTTA

## 2020-01-24 NOTE — ED PROVIDER NOTE - CLINICAL SUMMARY MEDICAL DECISION MAKING FREE TEXT BOX
syncope after bout of diarrhea and sleeping pill, scalp laceration stapled, - EKG, labs, CT for further evaluation.

## 2020-01-24 NOTE — ED PROVIDER NOTE - ENMT, MLM
Airway patent, Nasal mucosa clear. Mouth with normal mucosa. Throat has no vesicles, no oropharyngeal exudates and uvula is midline.  neck supple nontender

## 2020-01-24 NOTE — ED PROVIDER NOTE - PATIENT PORTAL LINK FT
You can access the FollowMyHealth Patient Portal offered by [facility] by registering at the following website: [email or web address]. By joining [employer]’s FollowMyHealth portal, you will also be able to view your health information using other applications (apps) compatible with our system.

## 2020-01-24 NOTE — ED PROVIDER NOTE - SKIN, MLM
Skin normal color for race, warm, dry. No evidence of rash. +3cm laceration posterior scalp and 8cm apical scalp, no bony deformity

## 2020-01-24 NOTE — ED ADULT NURSE NOTE - NSIMPLEMENTINTERV_GEN_ALL_ED
Implemented All Fall Risk Interventions:  [place] to call system. Call bell, personal items and telephone within reach. Instruct patient to call for assistance. Room bathroom lighting operational. Non-slip footwear when patient is off stretcher. Physically safe environment: no spills, clutter or unnecessary equipment. Stretcher in lowest position, wheels locked, appropriate side rails in place. Provide visual cue, wrist band, yellow gown, etc. Monitor gait and stability. Monitor for mental status changes and reorient to person, place, and time. Review medications for side effects contributing to fall risk. Reinforce activity limits and safety measures with patient and family.

## 2020-01-24 NOTE — ED PROVIDER NOTE - OBJECTIVE STATEMENT
78 y/o female with a PMHx of presents to the ED c/o laceration s/p syncope. Pt states she has not been sleeping well recently, will only get 2 hours of sleep every night. Last night she took remeron for sleep, but it did not help, only slept 2 hours, woke up in the middle of the night and had severe diarrhea. Then, ~10:00 today she got up, felt dizzy and fell forward in the bathroom, landed on face, believes a picture frame on the wall fell on her head. Now c/o 2 lacerations on back of head. +LOC, and back pain. No blood thinners.   PMD: Tong

## 2020-01-24 NOTE — ED PROVIDER NOTE - CARE PROVIDER_API CALL
Torin Fortune)  Internal Medicine  [address] [place] [address]  Phone: [phone]  Fax: [phone]  Established Patient  Follow Up Time: 1-3 Days

## 2020-01-24 NOTE — ED ADULT NURSE NOTE - PMH
Anxiety    Cholelithiasis with acute cholecystitis    Diverticulosis    Hiatal hernia    Hypertriglyceridemia    Insomnia    OAB (overactive bladder)

## 2020-01-24 NOTE — ED PROVIDER NOTE - MUSCULOSKELETAL, MLM
Spine appears normal, range of motion is not limited, no muscle or joint tenderness, extremities atraumaitc

## 2020-01-24 NOTE — ED PROVIDER NOTE - CARE PLAN
Principal Discharge DX:	Syncope  Secondary Diagnosis:	Scalp laceration  Secondary Diagnosis:	Dehydration

## 2020-01-24 NOTE — ED ADULT NURSE NOTE - CHIEF COMPLAINT QUOTE
" She fell in the bathroom 2 hours ago. She hurt her head. She passed out for 30 seconds . (+) head laceration (+) Diarrhea

## 2020-01-24 NOTE — ED ADULT NURSE NOTE - OBJECTIVE STATEMENT
Patient presents via wheelchair from home brought in by son after having a syncopal episode in the bathroom at home and sustaining a scalp laceration. Patient states she has been having trouble sleeping and was prescribed Remeron recently which has not seemed to help. Patient got up and had an episode of diarrhea during the night and then had nausea and about 2 hours ago felt she might have diarrhea again so headed to the bathroom got dizzy and passed out soiling herself and sustaining 2 scalp lacerations, one posterior scalp and one to top of head. Patient thinks she fell face down because she has tenderness to her nose. Patient showered herself and then called her son. Son noted broken picture frame on the floor in the bathroom. Also c/o pain to lower back localized to tailbone. Patient presents via wheelchair from home brought in by son after having a syncopal episode in the bathroom at home and sustaining a scalp laceration. Patient states she has been having trouble sleeping and was prescribed Remeron recently which has not seemed to help. Patient got up and had an episode of diarrhea during the night and then had nausea and about 2 hours ago felt she might have diarrhea again so headed to the bathroom got dizzy and passed out soiling herself and sustaining 2 scalp lacerations, one posterior scalp and one to top of head. Patient thinks she fell face down because she has tenderness to her nose and hematoma to right forehead. Patient showered herself and then called her son. Son noted broken picture frame on the floor in the bathroom. Also c/o pain to lower back localized to tailbone.

## 2020-01-24 NOTE — ED PROVIDER NOTE - PROGRESS NOTE DETAILS
Feels much better. Labs and CT xrays reviewed and copies given to pt. Will DC home. Staple removal in 7-10 days. Encourage PO fluids. Keep appt with Dr. Fortune and Neurologist as discussed.

## 2020-01-26 LAB
CULTURE RESULTS: SIGNIFICANT CHANGE UP
SPECIMEN SOURCE: SIGNIFICANT CHANGE UP

## 2020-02-03 ENCOUNTER — EMERGENCY (EMERGENCY)
Facility: HOSPITAL | Age: 80
LOS: 1 days | Discharge: HOME | End: 2020-02-03
Attending: EMERGENCY MEDICINE | Admitting: EMERGENCY MEDICINE
Payer: MEDICARE

## 2020-02-03 VITALS
HEIGHT: 62 IN | WEIGHT: 115.08 LBS | RESPIRATION RATE: 16 BRPM | OXYGEN SATURATION: 96 % | TEMPERATURE: 98 F | DIASTOLIC BLOOD PRESSURE: 80 MMHG | HEART RATE: 78 BPM | SYSTOLIC BLOOD PRESSURE: 115 MMHG

## 2020-02-03 DIAGNOSIS — Z98.51 TUBAL LIGATION STATUS: Chronic | ICD-10-CM

## 2020-02-03 DIAGNOSIS — Z90.710 ACQUIRED ABSENCE OF BOTH CERVIX AND UTERUS: Chronic | ICD-10-CM

## 2020-02-03 PROBLEM — F41.9 ANXIETY DISORDER, UNSPECIFIED: Chronic | Status: ACTIVE | Noted: 2020-01-24

## 2020-02-03 PROBLEM — G47.00 INSOMNIA, UNSPECIFIED: Chronic | Status: ACTIVE | Noted: 2020-01-24

## 2020-02-03 PROCEDURE — G0463: CPT

## 2020-02-03 PROCEDURE — L9995: CPT

## 2020-02-03 NOTE — ED PROVIDER NOTE - PROGRESS NOTE DETAILS
Veena BRICENO for Dr. Lester: 80 y/o female, had staples in scalp wound 10 days ago, requesting staple removal today. No complications. 8 staples removed from scalp. wound healing well, no complication.  MDM: f/u with PCP as needed. staples removed.  An opportunity to ask questions was given.  Discussed the importance of prompt, close medical follow-up.  Patient will return with any changes, concerns or persistent / worsening symptoms.  Understanding of all instructions verbalized.

## 2020-02-03 NOTE — ED ADULT NURSE NOTE - TEMPLATE LIST FOR HEAD TO TOE ASSESSMENT
Patient's wife calling to schedule for new cancer diagnosis with a Urologist. First available is scheduled past five business days. Sending to clinic for sooner appointment, per Oncology guidelines.    Please advise and reach out to patient for soon appointment as soon as possible.    -BR  
VIEW ALL

## 2020-02-03 NOTE — ED PROVIDER NOTE - PATIENT PORTAL LINK FT
You can access the FollowMyHealth Patient Portal offered by [facility] by registering at the following website: [email or web address]. By joining [employer]’s FollowMyHealth portal, you will also be able to view your health information using other applications (apps) compatible with our system.

## 2020-02-03 NOTE — ED PROVIDER NOTE - NSFOLLOWUPINSTRUCTIONS_ED_ALL_ED_FT
apply thin layer of bacitracin 1-2 times a day  clean gentle with soap and water  tylenol as needed for pain

## 2020-02-03 NOTE — ED PROVIDER NOTE - OBJECTIVE STATEMENT
presents for staple removal. staples were placed at this ED 10 days ago after fall. no pain or complaints. no drainage. no redness. healing well

## 2020-02-06 DIAGNOSIS — S01.01XD LACERATION WITHOUT FOREIGN BODY OF SCALP, SUBSEQUENT ENCOUNTER: ICD-10-CM

## 2020-02-29 ENCOUNTER — EMERGENCY (EMERGENCY)
Facility: HOSPITAL | Age: 80
LOS: 1 days | Discharge: HOME | End: 2020-02-29
Attending: EMERGENCY MEDICINE | Admitting: EMERGENCY MEDICINE
Payer: MEDICARE

## 2020-02-29 VITALS
OXYGEN SATURATION: 98 % | DIASTOLIC BLOOD PRESSURE: 70 MMHG | TEMPERATURE: 98 F | SYSTOLIC BLOOD PRESSURE: 170 MMHG | RESPIRATION RATE: 18 BRPM | HEART RATE: 6 BPM

## 2020-02-29 VITALS
HEART RATE: 72 BPM | RESPIRATION RATE: 20 BRPM | OXYGEN SATURATION: 100 % | WEIGHT: 125 LBS | DIASTOLIC BLOOD PRESSURE: 70 MMHG | TEMPERATURE: 98 F | SYSTOLIC BLOOD PRESSURE: 160 MMHG | HEIGHT: 62 IN

## 2020-02-29 DIAGNOSIS — Z98.51 TUBAL LIGATION STATUS: Chronic | ICD-10-CM

## 2020-02-29 DIAGNOSIS — Z90.710 ACQUIRED ABSENCE OF BOTH CERVIX AND UTERUS: Chronic | ICD-10-CM

## 2020-02-29 LAB
ALBUMIN SERPL ELPH-MCNC: 4.1 G/DL — SIGNIFICANT CHANGE UP (ref 3.3–5)
ALP SERPL-CCNC: 106 U/L — SIGNIFICANT CHANGE UP (ref 30–120)
ALT FLD-CCNC: 31 U/L DA — SIGNIFICANT CHANGE UP (ref 10–60)
ANION GAP SERPL CALC-SCNC: 9 MMOL/L — SIGNIFICANT CHANGE UP (ref 5–17)
AST SERPL-CCNC: 35 U/L — SIGNIFICANT CHANGE UP (ref 10–40)
BILIRUB SERPL-MCNC: 0.5 MG/DL — SIGNIFICANT CHANGE UP (ref 0.2–1.2)
BUN SERPL-MCNC: 12 MG/DL — SIGNIFICANT CHANGE UP (ref 7–23)
CALCIUM SERPL-MCNC: 9.6 MG/DL — SIGNIFICANT CHANGE UP (ref 8.4–10.5)
CHLORIDE SERPL-SCNC: 93 MMOL/L — LOW (ref 96–108)
CO2 SERPL-SCNC: 27 MMOL/L — SIGNIFICANT CHANGE UP (ref 22–31)
CREAT SERPL-MCNC: 0.64 MG/DL — SIGNIFICANT CHANGE UP (ref 0.5–1.3)
GLUCOSE SERPL-MCNC: 118 MG/DL — HIGH (ref 70–99)
HCT VFR BLD CALC: 40.3 % — SIGNIFICANT CHANGE UP (ref 34.5–45)
HGB BLD-MCNC: 13.2 G/DL — SIGNIFICANT CHANGE UP (ref 11.5–15.5)
MCHC RBC-ENTMCNC: 29.6 PG — SIGNIFICANT CHANGE UP (ref 27–34)
MCHC RBC-ENTMCNC: 32.8 GM/DL — SIGNIFICANT CHANGE UP (ref 32–36)
MCV RBC AUTO: 90.4 FL — SIGNIFICANT CHANGE UP (ref 80–100)
NRBC # BLD: 0 /100 WBCS — SIGNIFICANT CHANGE UP (ref 0–0)
PLATELET # BLD AUTO: 361 K/UL — SIGNIFICANT CHANGE UP (ref 150–400)
POTASSIUM SERPL-MCNC: 4.5 MMOL/L — SIGNIFICANT CHANGE UP (ref 3.5–5.3)
POTASSIUM SERPL-SCNC: 4.5 MMOL/L — SIGNIFICANT CHANGE UP (ref 3.5–5.3)
PROT SERPL-MCNC: 8 G/DL — SIGNIFICANT CHANGE UP (ref 6–8.3)
RBC # BLD: 4.46 M/UL — SIGNIFICANT CHANGE UP (ref 3.8–5.2)
RBC # FLD: 12.6 % — SIGNIFICANT CHANGE UP (ref 10.3–14.5)
SODIUM SERPL-SCNC: 129 MMOL/L — LOW (ref 135–145)
TROPONIN I SERPL-MCNC: 0 NG/ML — LOW (ref 0.02–0.06)
WBC # BLD: 10.44 K/UL — SIGNIFICANT CHANGE UP (ref 3.8–10.5)
WBC # FLD AUTO: 10.44 K/UL — SIGNIFICANT CHANGE UP (ref 3.8–10.5)

## 2020-02-29 PROCEDURE — 96361 HYDRATE IV INFUSION ADD-ON: CPT

## 2020-02-29 PROCEDURE — 99284 EMERGENCY DEPT VISIT MOD MDM: CPT | Mod: 25

## 2020-02-29 PROCEDURE — 96374 THER/PROPH/DIAG INJ IV PUSH: CPT

## 2020-02-29 PROCEDURE — 71045 X-RAY EXAM CHEST 1 VIEW: CPT | Mod: 26

## 2020-02-29 PROCEDURE — 84484 ASSAY OF TROPONIN QUANT: CPT

## 2020-02-29 PROCEDURE — 36415 COLL VENOUS BLD VENIPUNCTURE: CPT

## 2020-02-29 PROCEDURE — 93010 ELECTROCARDIOGRAM REPORT: CPT

## 2020-02-29 PROCEDURE — 85027 COMPLETE CBC AUTOMATED: CPT

## 2020-02-29 PROCEDURE — 71045 X-RAY EXAM CHEST 1 VIEW: CPT

## 2020-02-29 PROCEDURE — 93005 ELECTROCARDIOGRAM TRACING: CPT

## 2020-02-29 PROCEDURE — 99284 EMERGENCY DEPT VISIT MOD MDM: CPT

## 2020-02-29 PROCEDURE — 80053 COMPREHEN METABOLIC PANEL: CPT

## 2020-02-29 RX ORDER — SODIUM CHLORIDE 9 MG/ML
1000 INJECTION INTRAMUSCULAR; INTRAVENOUS; SUBCUTANEOUS ONCE
Refills: 0 | Status: COMPLETED | OUTPATIENT
Start: 2020-02-29 | End: 2020-02-29

## 2020-02-29 RX ORDER — GLUCAGON INJECTION, SOLUTION 0.5 MG/.1ML
2 INJECTION, SOLUTION SUBCUTANEOUS ONCE
Refills: 0 | Status: COMPLETED | OUTPATIENT
Start: 2020-02-29 | End: 2020-02-29

## 2020-02-29 RX ADMIN — SODIUM CHLORIDE 1000 MILLILITER(S): 9 INJECTION INTRAMUSCULAR; INTRAVENOUS; SUBCUTANEOUS at 21:34

## 2020-02-29 RX ADMIN — SODIUM CHLORIDE 1000 MILLILITER(S): 9 INJECTION INTRAMUSCULAR; INTRAVENOUS; SUBCUTANEOUS at 20:33

## 2020-02-29 RX ADMIN — GLUCAGON INJECTION, SOLUTION 2 MILLIGRAM(S): 0.5 INJECTION, SOLUTION SUBCUTANEOUS at 20:29

## 2020-02-29 NOTE — ED PROVIDER NOTE - NS_ ATTENDINGSCRIBEDETAILS _ED_A_ED_FT
Bro Foy MD - The scribe's documentation has been prepared under my direction and personally reviewed by me in its entirety. I confirm that the note above accurately reflects all work, treatment, procedures, and medical decision making performed by me.

## 2020-02-29 NOTE — ED PROVIDER NOTE - PATIENT PORTAL LINK FT
You can access the FollowMyHealth Patient Portal offered by [facility] by registering at the following website: [email or web address]. By joining [employer]’s FollowMyHealth portal, you will also be able to view your health information using other applications (apps) compatible with our system.

## 2020-02-29 NOTE — ED PROVIDER NOTE - CLINICAL SUMMARY MEDICAL DECISION MAKING FREE TEXT BOX
Pt with sensation of food stuck in lower esophagus. States is unable to tolerate saliva. No spitting up in ED. Will give IV fluids, check labs, give glucagon, and reassess.

## 2020-02-29 NOTE — ED PROVIDER NOTE - ENMT, MLM
Airway patent, Nasal mucosa clear. Mouth with normal mucosa. Throat has no vesicles, no oropharyngeal exudates and uvula is midline. oropharynx is normal.

## 2020-02-29 NOTE — ED ADULT TRIAGE NOTE - CHIEF COMPLAINT QUOTE
Ate dry salmon, dry tofu and rice 1 hour ago. Feels food "stuck in esophagus and won't go down". C/O chest tightness. Denies N/V.

## 2020-02-29 NOTE — ED PROVIDER NOTE - OBJECTIVE STATEMENT
80 y/o female with a PMHx of insomnia, anxiety, OAB, hiatal hernia, diverticulosis, cholelithiasis, and hypertriglyceridemia, presents to the ED c/o chest pain. Pt was eating salmon, tofu, and avocado today at 6pm, felt that a piece got stuck in her throat, and felt it slide down slowly. Pain is described as a tightness and is a 5-6/10. Pain is exacerbated with swallowing. Denies difficulty breathing, nausea or vomiting. Concerned that it is a cardiac problem, so came to [facility] ED. Allergic to sulfa. No hx of cardiac testing or stress test in the past. PCP: Dr. Fournier. Former smoker. No other complaints at this time.

## 2020-02-29 NOTE — ED ADULT NURSE NOTE - NSIMPLEMENTINTERV_GEN_ALL_ED
Implemented All Universal Safety Interventions:  [place] to call system. Call bell, personal items and telephone within reach. Instruct patient to call for assistance. Room bathroom lighting operational. Non-slip footwear when patient is off stretcher. Physically safe environment: no spills, clutter or unnecessary equipment. Stretcher in lowest position, wheels locked, appropriate side rails in place.

## 2020-02-29 NOTE — ED PROVIDER NOTE - CARE PROVIDER_API CALL
Everette Owen ()  Internal Medicine  [address] [place] [address]  Phone: [phone]  Fax: [phone]  Follow Up Time:     Anastacio Silva)  Internal Medicine  [address] [place] [address]  Phone: [phone]  Fax: [phone]  Follow Up Time:

## 2020-03-25 ENCOUNTER — EMERGENCY (EMERGENCY)
Facility: HOSPITAL | Age: 80
LOS: 1 days | Discharge: HOME | End: 2020-03-25
Attending: STUDENT IN AN ORGANIZED HEALTH CARE EDUCATION/TRAINING PROGRAM | Admitting: STUDENT IN AN ORGANIZED HEALTH CARE EDUCATION/TRAINING PROGRAM
Payer: MEDICARE

## 2020-03-25 VITALS
RESPIRATION RATE: 16 BRPM | DIASTOLIC BLOOD PRESSURE: 91 MMHG | SYSTOLIC BLOOD PRESSURE: 188 MMHG | HEART RATE: 61 BPM | OXYGEN SATURATION: 99 %

## 2020-03-25 VITALS
OXYGEN SATURATION: 99 % | RESPIRATION RATE: 16 BRPM | HEART RATE: 80 BPM | DIASTOLIC BLOOD PRESSURE: 195 MMHG | WEIGHT: 113.98 LBS | HEIGHT: 62 IN | SYSTOLIC BLOOD PRESSURE: 173 MMHG | TEMPERATURE: 97 F

## 2020-03-25 DIAGNOSIS — Z90.710 ACQUIRED ABSENCE OF BOTH CERVIX AND UTERUS: Chronic | ICD-10-CM

## 2020-03-25 DIAGNOSIS — Z98.51 TUBAL LIGATION STATUS: Chronic | ICD-10-CM

## 2020-03-25 LAB
APPEARANCE UR: CLEAR — SIGNIFICANT CHANGE UP
BACTERIA # UR AUTO: ABNORMAL
BILIRUB UR-MCNC: NEGATIVE — SIGNIFICANT CHANGE UP
COLOR SPEC: YELLOW — SIGNIFICANT CHANGE UP
DIFF PNL FLD: ABNORMAL
EPI CELLS # UR: SIGNIFICANT CHANGE UP
GLUCOSE UR QL: NEGATIVE — SIGNIFICANT CHANGE UP
KETONES UR-MCNC: NEGATIVE — SIGNIFICANT CHANGE UP
LEUKOCYTE ESTERASE UR-ACNC: ABNORMAL
NITRITE UR-MCNC: NEGATIVE — SIGNIFICANT CHANGE UP
PH UR: 6 — SIGNIFICANT CHANGE UP (ref 5–8)
PROT UR-MCNC: NEGATIVE — SIGNIFICANT CHANGE UP
RBC CASTS # UR COMP ASSIST: ABNORMAL /HPF (ref 0–4)
SP GR SPEC: 1.01 — SIGNIFICANT CHANGE UP (ref 1.01–1.02)
UROBILINOGEN FLD QL: NEGATIVE — SIGNIFICANT CHANGE UP
WBC UR QL: SIGNIFICANT CHANGE UP

## 2020-03-25 PROCEDURE — 99284 EMERGENCY DEPT VISIT MOD MDM: CPT

## 2020-03-25 PROCEDURE — 96375 TX/PRO/DX INJ NEW DRUG ADDON: CPT

## 2020-03-25 PROCEDURE — 96361 HYDRATE IV INFUSION ADD-ON: CPT

## 2020-03-25 PROCEDURE — 99284 EMERGENCY DEPT VISIT MOD MDM: CPT | Mod: 25

## 2020-03-25 PROCEDURE — 81001 URINALYSIS AUTO W/SCOPE: CPT

## 2020-03-25 PROCEDURE — 87086 URINE CULTURE/COLONY COUNT: CPT

## 2020-03-25 PROCEDURE — 96374 THER/PROPH/DIAG INJ IV PUSH: CPT

## 2020-03-25 RX ORDER — SODIUM CHLORIDE 9 MG/ML
1000 INJECTION INTRAMUSCULAR; INTRAVENOUS; SUBCUTANEOUS ONCE
Refills: 0 | Status: COMPLETED | OUTPATIENT
Start: 2020-03-25 | End: 2020-03-25

## 2020-03-25 RX ORDER — FAMOTIDINE 10 MG/ML
20 INJECTION INTRAVENOUS ONCE
Refills: 0 | Status: COMPLETED | OUTPATIENT
Start: 2020-03-25 | End: 2020-03-25

## 2020-03-25 RX ORDER — FAMOTIDINE 10 MG/ML
1 INJECTION INTRAVENOUS
Qty: 6 | Refills: 0
Start: 2020-03-25 | End: 2020-03-27

## 2020-03-25 RX ORDER — AMLODIPINE BESYLATE 2.5 MG/1
1 TABLET ORAL
Qty: 0 | Refills: 0 | DISCHARGE

## 2020-03-25 RX ORDER — AMLODIPINE BESYLATE 2.5 MG/1
2.5 TABLET ORAL ONCE
Refills: 0 | Status: COMPLETED | OUTPATIENT
Start: 2020-03-25 | End: 2020-03-25

## 2020-03-25 RX ORDER — MIRTAZAPINE 45 MG/1
1 TABLET, ORALLY DISINTEGRATING ORAL
Qty: 0 | Refills: 0 | DISCHARGE

## 2020-03-25 RX ADMIN — AMLODIPINE BESYLATE 2.5 MILLIGRAM(S): 2.5 TABLET ORAL at 06:16

## 2020-03-25 RX ADMIN — SODIUM CHLORIDE 1000 MILLILITER(S): 9 INJECTION INTRAMUSCULAR; INTRAVENOUS; SUBCUTANEOUS at 05:06

## 2020-03-25 RX ADMIN — SODIUM CHLORIDE 1000 MILLILITER(S): 9 INJECTION INTRAMUSCULAR; INTRAVENOUS; SUBCUTANEOUS at 06:06

## 2020-03-25 RX ADMIN — FAMOTIDINE 20 MILLIGRAM(S): 10 INJECTION INTRAVENOUS at 05:05

## 2020-03-25 RX ADMIN — Medication 125 MILLIGRAM(S): at 05:05

## 2020-03-25 NOTE — ED PROVIDER NOTE - PATIENT PORTAL LINK FT
You can access the FollowMyHealth Patient Portal offered by [facility] by registering at the following website: [email or web address]. By joining [employer]’s FollowMyHealth portal, you will also be able to view your health information using other applications (apps) compatible with our system.

## 2020-03-25 NOTE — ED PROVIDER NOTE - CARE PROVIDER_API CALL
Leon Blanco)  Urology  [address] [facility], [address]  [place] [address]  Phone: [phone]  Fax: [phone]  Follow Up Time:     Torin Fortune)  Internal Medicine  [address] [place] [address]  Phone: [phone]  Fax: [phone]  Follow Up Time:

## 2020-03-25 NOTE — ED PROVIDER NOTE - CHPI ED SYMPTOMS NEG
no difficulty breathing/no throat itching/no wheezing/no shortness of breath/no swelling of face, tongue/no vomiting/no cough

## 2020-03-25 NOTE — ED PROVIDER NOTE - PHYSICAL EXAMINATION
No swelling to tongue, face, lips, or uvula  Diffuse erythema to face, non-tender to palpation, no hives, no abscess.  Flat, non-petechial.  Blanching

## 2020-03-25 NOTE — ED ADULT NURSE NOTE - CHPI ED NUR SYMPTOMS NEG
no congestion/no difficulty breathing/no difficulty swallowing/no shortness of breath/no vomiting/no nausea/no throat itching

## 2020-03-25 NOTE — ED ADULT NURSE NOTE - OBJECTIVE STATEMENT
Pt presents to ED c/o allergic reaction. Pt states she has chronic UTI and restarted her antibiotic 7 days ago. Pt states she is taking 2 medications and is not sure which medication the reaction is from. Pt has a facial rash. Pt denies SOB, chest pain, difficulty speaking or swallowing.

## 2020-03-25 NOTE — ED PROVIDER NOTE - NSFOLLOWUPINSTRUCTIONS_ED_ALL_ED_FT
Please stop taking the antibiotic (Cefdinir) and follow up with urology and your primary care doctor.  Take the steroids as prescribed.  Return to the ER for persistent rash, itching, shortness of breath, swelling to your face/lips, fever or any other concerns.

## 2020-03-25 NOTE — ED PROVIDER NOTE - CONSTITUTIONAL, MLM
normal... Well appearing, awake, alert, oriented to person, place, time/situation and in no apparent distress. Patient anxious

## 2020-03-25 NOTE — ED PROVIDER NOTE - PROGRESS NOTE DETAILS
Patient advised to not take final day of cefdinir.  UA clean, culture sent.  No need for further abx at this time.  Son will  patient. Redness to face has improved.  Prednisone and pepcid sent to pharmacy.  Patient admits to poor sleeping habits and anxiety contributing to HTN and she is following with PMD.

## 2020-03-25 NOTE — ED PROVIDER NOTE - CLINICAL SUMMARY MEDICAL DECISION MAKING FREE TEXT BOX
79 year old female with redness/itch to face after taking Cefdinir and Omeprazole last night, no respiratory complaints, no swelling.  Treated with Benadryl PTA.  UA, Cx, steroids, pepcid, hydrate, reassess

## 2020-03-25 NOTE — ED PROVIDER NOTE - OBJECTIVE STATEMENT
79 year old female with a history of UTI, HLD anxiety, insomnia presents with allergic reaction.  Patient was started on Cefdinir for UTI approximately 1 week ago.  She has taken this medication before without a reaction but last night after dinner, she took 1 tab of Cefdinir along with Omeprazole.  At 9pm, she noted that her face was red.  She woke up at 1am and felt her entire forehead itching and face throbbing so she took 2 tabs of Benadryl PO and called 911.  Denies SOB, swelling to tongue/lips, hives, chest pain.  She has 1 day left in her course of cefdinir.  Patient states she was started on Omeprazole 2 weeks ago by GI, she is being evaluated for persistent abdominal pain/bloating and had a normal outpatient CT abd/pelvis 6 days ago.  She had been on Pyridium as well 3 days prior (took it for 2 days).   Patient lives alone. PMD Dr. Fortune, Urology Dr. Blanco

## 2020-03-26 LAB
CULTURE RESULTS: SIGNIFICANT CHANGE UP
SPECIMEN SOURCE: SIGNIFICANT CHANGE UP

## 2020-04-13 ENCOUNTER — APPOINTMENT (OUTPATIENT)
Dept: CARDIOLOGY | Facility: CLINIC | Age: 80
End: 2020-04-13

## 2020-06-02 ENCOUNTER — APPOINTMENT (OUTPATIENT)
Dept: CARDIOLOGY | Facility: CLINIC | Age: 80
End: 2020-06-02
Payer: MEDICARE

## 2020-06-02 VITALS
SYSTOLIC BLOOD PRESSURE: 120 MMHG | WEIGHT: 117 LBS | TEMPERATURE: 98.6 F | BODY MASS INDEX: 21.53 KG/M2 | HEIGHT: 62 IN | HEART RATE: 74 BPM | DIASTOLIC BLOOD PRESSURE: 70 MMHG | OXYGEN SATURATION: 97 %

## 2020-06-02 DIAGNOSIS — R42 DIZZINESS AND GIDDINESS: ICD-10-CM

## 2020-06-02 DIAGNOSIS — Z12.39 ENCOUNTER FOR OTHER SCREENING FOR MALIGNANT NEOPLASM OF BREAST: ICD-10-CM

## 2020-06-02 DIAGNOSIS — D22.9 MELANOCYTIC NEVI, UNSPECIFIED: ICD-10-CM

## 2020-06-02 DIAGNOSIS — Z78.9 OTHER SPECIFIED HEALTH STATUS: ICD-10-CM

## 2020-06-02 DIAGNOSIS — Z60.2 PROBLEMS RELATED TO LIVING ALONE: ICD-10-CM

## 2020-06-02 DIAGNOSIS — Z82.49 FAMILY HISTORY OF ISCHEMIC HEART DISEASE AND OTHER DISEASES OF THE CIRCULATORY SYSTEM: ICD-10-CM

## 2020-06-02 DIAGNOSIS — Z71.89 OTHER SPECIFIED COUNSELING: ICD-10-CM

## 2020-06-02 DIAGNOSIS — R43.2 PARAGEUSIA: ICD-10-CM

## 2020-06-02 DIAGNOSIS — K21.9 GASTRO-ESOPHAGEAL REFLUX DISEASE W/OUT ESOPHAGITIS: ICD-10-CM

## 2020-06-02 DIAGNOSIS — R00.2 PALPITATIONS: ICD-10-CM

## 2020-06-02 PROCEDURE — 99204 OFFICE O/P NEW MOD 45 MIN: CPT

## 2020-06-02 PROCEDURE — 93000 ELECTROCARDIOGRAM COMPLETE: CPT

## 2020-06-02 RX ORDER — SUVOREXANT 10 MG/1
10 TABLET, FILM COATED ORAL
Qty: 30 | Refills: 1 | Status: DISCONTINUED | COMMUNITY
Start: 2017-11-28 | End: 2020-06-02

## 2020-06-02 RX ORDER — ALPRAZOLAM 0.25 MG/1
0.25 TABLET ORAL
Qty: 120 | Refills: 0 | Status: DISCONTINUED | COMMUNITY
Start: 2017-10-03 | End: 2020-06-02

## 2020-06-02 RX ORDER — TEMAZEPAM 15 MG/1
15 CAPSULE ORAL
Qty: 30 | Refills: 0 | Status: DISCONTINUED | COMMUNITY
Start: 2018-01-10 | End: 2020-06-02

## 2020-06-02 SDOH — SOCIAL STABILITY - SOCIAL INSECURITY: PROBLEMS RELATED TO LIVING ALONE: Z60.2

## 2020-06-02 NOTE — PHYSICAL EXAM
[General Appearance - Well Developed] : well developed [Normal Appearance] : normal appearance [Well Groomed] : well groomed [General Appearance - Well Nourished] : well nourished [No Deformities] : no deformities [General Appearance - In No Acute Distress] : no acute distress [Normal Conjunctiva] : the conjunctiva exhibited no abnormalities [Eyelids - No Xanthelasma] : the eyelids demonstrated no xanthelasmas [Normal Oral Mucosa] : normal oral mucosa [No Oral Pallor] : no oral pallor [No Oral Cyanosis] : no oral cyanosis [Normal Jugular Venous A Waves Present] : normal jugular venous A waves present [Normal Jugular Venous V Waves Present] : normal jugular venous V waves present [No Jugular Venous Florence A Waves] : no jugular venous florence A waves [Heart Rate And Rhythm] : heart rate and rhythm were normal [Heart Sounds] : normal S1 and S2 [Murmurs] : no murmurs present [Respiration, Rhythm And Depth] : normal respiratory rhythm and effort [Exaggerated Use Of Accessory Muscles For Inspiration] : no accessory muscle use [Auscultation Breath Sounds / Voice Sounds] : lungs were clear to auscultation bilaterally [Abdomen Soft] : soft [Abdomen Tenderness] : non-tender [Abdomen Mass (___ Cm)] : no abdominal mass palpated [Abnormal Walk] : normal gait [Gait - Sufficient For Exercise Testing] : the gait was sufficient for exercise testing [Cyanosis, Localized] : no localized cyanosis [Nail Clubbing] : no clubbing of the fingernails [Petechial Hemorrhages (___cm)] : no petechial hemorrhages [Skin Color & Pigmentation] : normal skin color and pigmentation [] : no rash [Skin Lesions] : no skin lesions [No Skin Ulcers] : no skin ulcer [No Venous Stasis] : no venous stasis [No Xanthoma] : no  xanthoma was observed [Oriented To Time, Place, And Person] : oriented to person, place, and time [No Anxiety] : not feeling anxious [Affect] : the affect was normal [Mood] : the mood was normal

## 2020-06-02 NOTE — REVIEW OF SYSTEMS
[Feeling Fatigued] : feeling fatigued [Abdominal Pain] : abdominal pain [Convulsions] : convulsions [Anxiety] : anxiety [see HPI] : see HPI [Negative] : Heme/Lymph

## 2020-06-02 NOTE — REASON FOR VISIT
[Initial Evaluation] : an initial evaluation of [FreeTextEntry1] : Larissa is a 80yo female with PMH of Insomnia, anxiety,and HTN. Patient reports that she has had difficulty sleeping for many years, she has discontinued all sleeping medications because they were no longer helping her. She also reports abdominal bloating and discomfort and following with GI and states she is planned for endoscopy at the end of this month. Patient denies chest pain, shortness of breath, palpitations, vision changes, n/v, abdominal pain, changes in bowel/bladder habits,  or appetite. Patient also reports dizziness when she wakes up in the middle of the night, she does not experience dizziness during the daytime. Patient also reports that in January of this year she had a syncopal episodes. She was told this was due to dehydration, her sodium was noted to be 128. She had CXR, CT brain and Xray of sacrum and coccyx in hospital that were wnl.

## 2020-06-05 ENCOUNTER — NON-APPOINTMENT (OUTPATIENT)
Age: 80
End: 2020-06-05

## 2020-06-05 DIAGNOSIS — R79.89 OTHER SPECIFIED ABNORMAL FINDINGS OF BLOOD CHEMISTRY: ICD-10-CM

## 2020-06-05 DIAGNOSIS — E87.1 HYPO-OSMOLALITY AND HYPONATREMIA: ICD-10-CM

## 2020-06-05 LAB
25(OH)D3 SERPL-MCNC: 30.3 NG/ML
ALBUMIN SERPL ELPH-MCNC: 4.9 G/DL
ALP BLD-CCNC: 72 U/L
ALT SERPL-CCNC: 32 U/L
AMYLASE/CREAT SERPL: 134 U/L
ANION GAP SERPL CALC-SCNC: 16 MMOL/L
APPEARANCE: CLEAR
AST SERPL-CCNC: 22 U/L
BACTERIA: NEGATIVE
BASOPHILS # BLD AUTO: 0.05 K/UL
BASOPHILS NFR BLD AUTO: 0.5 %
BILIRUB SERPL-MCNC: 0.3 MG/DL
BILIRUBIN URINE: NEGATIVE
BLOOD URINE: NEGATIVE
BUN SERPL-MCNC: 16 MG/DL
CALCIUM SERPL-MCNC: 9.8 MG/DL
CHLORIDE SERPL-SCNC: 93 MMOL/L
CHOLEST SERPL-MCNC: 231 MG/DL
CHOLEST/HDLC SERPL: 4.6 RATIO
CO2 SERPL-SCNC: 22 MMOL/L
COLOR: NORMAL
CREAT SERPL-MCNC: 0.81 MG/DL
EOSINOPHIL # BLD AUTO: 0.12 K/UL
EOSINOPHIL NFR BLD AUTO: 1.3 %
ESTIMATED AVERAGE GLUCOSE: 114 MG/DL
GLUCOSE QUALITATIVE U: NEGATIVE
GLUCOSE SERPL-MCNC: 90 MG/DL
HBA1C MFR BLD HPLC: 5.6 %
HCT VFR BLD CALC: 42.3 %
HDLC SERPL-MCNC: 50 MG/DL
HGB BLD-MCNC: 13.5 G/DL
HYALINE CASTS: 2 /LPF
IMM GRANULOCYTES NFR BLD AUTO: 0.2 %
KETONES URINE: NEGATIVE
LDLC SERPL CALC-MCNC: 122 MG/DL
LEUKOCYTE ESTERASE URINE: ABNORMAL
LPL SERPL-CCNC: 36 U/L
LYMPHOCYTES # BLD AUTO: 3.72 K/UL
LYMPHOCYTES NFR BLD AUTO: 38.9 %
MAN DIFF?: NORMAL
MCHC RBC-ENTMCNC: 29.7 PG
MCHC RBC-ENTMCNC: 31.9 GM/DL
MCV RBC AUTO: 93.2 FL
MICROSCOPIC-UA: NORMAL
MONOCYTES # BLD AUTO: 0.68 K/UL
MONOCYTES NFR BLD AUTO: 7.1 %
NEUTROPHILS # BLD AUTO: 4.97 K/UL
NEUTROPHILS NFR BLD AUTO: 52 %
NITRITE URINE: NEGATIVE
PH URINE: 6
PLATELET # BLD AUTO: 381 K/UL
POTASSIUM SERPL-SCNC: 4.2 MMOL/L
PROT SERPL-MCNC: 7.4 G/DL
PROTEIN URINE: NEGATIVE
RBC # BLD: 4.54 M/UL
RBC # FLD: 13.8 %
RED BLOOD CELLS URINE: 3 /HPF
SARS-COV-2 IGG SERPL IA-ACNC: 0.1 INDEX
SARS-COV-2 IGG SERPL QL IA: NEGATIVE
SODIUM SERPL-SCNC: 131 MMOL/L
SPECIFIC GRAVITY URINE: 1.02
SQUAMOUS EPITHELIAL CELLS: 4 /HPF
T4 FREE SERPL-MCNC: 1.3 NG/DL
TRIGL SERPL-MCNC: 295 MG/DL
TSH SERPL-ACNC: 1.78 UIU/ML
UROBILINOGEN URINE: NORMAL
WBC # FLD AUTO: 9.56 K/UL
WHITE BLOOD CELLS URINE: 24 /HPF

## 2020-06-09 ENCOUNTER — APPOINTMENT (OUTPATIENT)
Dept: CARDIOLOGY | Facility: CLINIC | Age: 80
End: 2020-06-09
Payer: MEDICARE

## 2020-06-09 PROCEDURE — 93015 CV STRESS TEST SUPVJ I&R: CPT

## 2020-06-09 PROCEDURE — A9500: CPT

## 2020-06-09 PROCEDURE — 93306 TTE W/DOPPLER COMPLETE: CPT

## 2020-06-09 PROCEDURE — 78452 HT MUSCLE IMAGE SPECT MULT: CPT

## 2020-06-09 RX ORDER — REGADENOSON 0.08 MG/ML
0.4 INJECTION, SOLUTION INTRAVENOUS
Qty: 1 | Refills: 0 | Status: COMPLETED | OUTPATIENT
Start: 2020-06-09

## 2020-06-09 RX ADMIN — REGADENOSON 0 MG/5ML: 0.08 INJECTION, SOLUTION INTRAVENOUS at 00:00

## 2020-06-10 PROCEDURE — 93224 XTRNL ECG REC UP TO 48 HRS: CPT

## 2020-06-12 DIAGNOSIS — Z87.440 PERSONAL HISTORY OF URINARY (TRACT) INFECTIONS: ICD-10-CM

## 2020-06-12 RX ORDER — NITROFURANTOIN (MONOHYDRATE/MACROCRYSTALS) 25; 75 MG/1; MG/1
100 CAPSULE ORAL
Qty: 14 | Refills: 0 | Status: DISCONTINUED | COMMUNITY
Start: 2020-06-05 | End: 2020-06-12

## 2020-06-22 ENCOUNTER — APPOINTMENT (OUTPATIENT)
Dept: CARDIOLOGY | Facility: CLINIC | Age: 80
End: 2020-06-22
Payer: MEDICARE

## 2020-06-22 VITALS
TEMPERATURE: 98.6 F | BODY MASS INDEX: 22.08 KG/M2 | SYSTOLIC BLOOD PRESSURE: 120 MMHG | OXYGEN SATURATION: 98 % | WEIGHT: 120 LBS | HEART RATE: 86 BPM | DIASTOLIC BLOOD PRESSURE: 70 MMHG | HEIGHT: 62 IN

## 2020-06-22 DIAGNOSIS — N39.0 URINARY TRACT INFECTION, SITE NOT SPECIFIED: ICD-10-CM

## 2020-06-22 PROCEDURE — 99213 OFFICE O/P EST LOW 20 MIN: CPT

## 2020-06-22 RX ORDER — CIPROFLOXACIN HYDROCHLORIDE 250 MG/1
250 TABLET, FILM COATED ORAL
Qty: 14 | Refills: 0 | Status: DISCONTINUED | COMMUNITY
Start: 2020-06-12 | End: 2020-06-22

## 2020-06-22 NOTE — PHYSICAL EXAM
[Normal Appearance] : normal appearance [General Appearance - Well Developed] : well developed [Well Groomed] : well groomed [General Appearance - Well Nourished] : well nourished [No Deformities] : no deformities [General Appearance - In No Acute Distress] : no acute distress [Normal Conjunctiva] : the conjunctiva exhibited no abnormalities [Eyelids - No Xanthelasma] : the eyelids demonstrated no xanthelasmas [Normal Oral Mucosa] : normal oral mucosa [No Oral Cyanosis] : no oral cyanosis [No Oral Pallor] : no oral pallor [Normal Jugular Venous A Waves Present] : normal jugular venous A waves present [Normal Jugular Venous V Waves Present] : normal jugular venous V waves present [No Jugular Venous Florence A Waves] : no jugular venous florence A waves [Respiration, Rhythm And Depth] : normal respiratory rhythm and effort [Exaggerated Use Of Accessory Muscles For Inspiration] : no accessory muscle use [Heart Rate And Rhythm] : heart rate and rhythm were normal [Auscultation Breath Sounds / Voice Sounds] : lungs were clear to auscultation bilaterally [Heart Sounds] : normal S1 and S2 [Murmurs] : no murmurs present [Abdomen Soft] : soft [Abdomen Tenderness] : non-tender [Abdomen Mass (___ Cm)] : no abdominal mass palpated [Abnormal Walk] : normal gait [Gait - Sufficient For Exercise Testing] : the gait was sufficient for exercise testing [Nail Clubbing] : no clubbing of the fingernails [Petechial Hemorrhages (___cm)] : no petechial hemorrhages [Cyanosis, Localized] : no localized cyanosis [] : no rash [Skin Color & Pigmentation] : normal skin color and pigmentation [No Venous Stasis] : no venous stasis [Skin Lesions] : no skin lesions [No Skin Ulcers] : no skin ulcer [No Xanthoma] : no  xanthoma was observed [Oriented To Time, Place, And Person] : oriented to person, place, and time [Mood] : the mood was normal [Affect] : the affect was normal [No Anxiety] : not feeling anxious

## 2020-06-22 NOTE — HISTORY OF PRESENT ILLNESS
[FreeTextEntry1] : Larissa is a 80yo female with PMH of Insomnia, anxiety,and HTN. Patient reporting today that she had her urine retested recently as her first urine sample was found to be positive. Patient was prescribed Cipro and Levaquin but reports she cannot take these medications due to their neuro side effects. Patient states she has been experiencing burning on urination at night. Patient was also found to have abnormal EKG when previously in office, she had echo, palpitations and nuclear stress test which were wnl.Patient also found to have low sodium on previous lab work, patient has not scheduled with Dr. Roe yet. Patient has also been reporting continued abdominal discomfort, she has EGD scheduled for this week. This pain is worse with spicy foods or acidic foods. Patient also with continued insomnia.pt with anxiety

## 2020-08-09 LAB
ALBUMIN SERPL ELPH-MCNC: 4.7 G/DL
ALP BLD-CCNC: 76 U/L
ALT SERPL-CCNC: 24 U/L
AMYLASE/CREAT SERPL: 141 U/L
ANION GAP SERPL CALC-SCNC: 12 MMOL/L
APPEARANCE: CLEAR
APPEARANCE: CLEAR
AST SERPL-CCNC: 20 U/L
BACTERIA UR CULT: ABNORMAL
BACTERIA UR CULT: NORMAL
BACTERIA: NEGATIVE
BACTERIA: NEGATIVE
BASOPHILS # BLD AUTO: 0.04 K/UL
BASOPHILS NFR BLD AUTO: 0.5 %
BILIRUB SERPL-MCNC: 0.2 MG/DL
BILIRUBIN URINE: NEGATIVE
BILIRUBIN URINE: NEGATIVE
BLOOD URINE: NEGATIVE
BLOOD URINE: NEGATIVE
BUN SERPL-MCNC: 13 MG/DL
CALCIUM SERPL-MCNC: 9.9 MG/DL
CHLORIDE SERPL-SCNC: 93 MMOL/L
CO2 SERPL-SCNC: 25 MMOL/L
COLOR: COLORLESS
COLOR: NORMAL
CREAT SERPL-MCNC: 0.81 MG/DL
EOSINOPHIL # BLD AUTO: 0.09 K/UL
EOSINOPHIL NFR BLD AUTO: 1.2 %
GLUCOSE QUALITATIVE U: NEGATIVE
GLUCOSE QUALITATIVE U: NEGATIVE
GLUCOSE SERPL-MCNC: 91 MG/DL
HCT VFR BLD CALC: 39.4 %
HGB BLD-MCNC: 12.5 G/DL
HYALINE CASTS: 0 /LPF
HYALINE CASTS: 2 /LPF
IMM GRANULOCYTES NFR BLD AUTO: 0.4 %
KETONES URINE: NEGATIVE
KETONES URINE: NEGATIVE
LEUKOCYTE ESTERASE URINE: ABNORMAL
LEUKOCYTE ESTERASE URINE: ABNORMAL
LPL SERPL-CCNC: 41 U/L
LYMPHOCYTES # BLD AUTO: 2.72 K/UL
LYMPHOCYTES NFR BLD AUTO: 34.9 %
MAN DIFF?: NORMAL
MCHC RBC-ENTMCNC: 29.5 PG
MCHC RBC-ENTMCNC: 31.7 GM/DL
MCV RBC AUTO: 92.9 FL
MICROSCOPIC-UA: NORMAL
MICROSCOPIC-UA: NORMAL
MONOCYTES # BLD AUTO: 0.74 K/UL
MONOCYTES NFR BLD AUTO: 9.5 %
NEUTROPHILS # BLD AUTO: 4.17 K/UL
NEUTROPHILS NFR BLD AUTO: 53.5 %
NITRITE URINE: NEGATIVE
NITRITE URINE: NEGATIVE
PH URINE: 7
PH URINE: 7
PLATELET # BLD AUTO: 350 K/UL
POTASSIUM SERPL-SCNC: 4 MMOL/L
PROT SERPL-MCNC: 7 G/DL
PROTEIN URINE: NEGATIVE
PROTEIN URINE: NEGATIVE
RBC # BLD: 4.24 M/UL
RBC # FLD: 13.3 %
RED BLOOD CELLS URINE: 0 /HPF
RED BLOOD CELLS URINE: 2 /HPF
SODIUM SERPL-SCNC: 130 MMOL/L
SPECIFIC GRAVITY URINE: 1.01
SPECIFIC GRAVITY URINE: 1.01
SQUAMOUS EPITHELIAL CELLS: 2 /HPF
SQUAMOUS EPITHELIAL CELLS: 3 /HPF
UROBILINOGEN URINE: NORMAL
UROBILINOGEN URINE: NORMAL
WBC # FLD AUTO: 7.79 K/UL
WHITE BLOOD CELLS URINE: 3 /HPF
WHITE BLOOD CELLS URINE: 6 /HPF

## 2020-09-23 ENCOUNTER — APPOINTMENT (OUTPATIENT)
Dept: DISASTER EMERGENCY | Facility: CLINIC | Age: 80
End: 2020-09-23

## 2020-09-23 DIAGNOSIS — Z01.818 ENCOUNTER FOR OTHER PREPROCEDURAL EXAMINATION: ICD-10-CM

## 2020-09-24 LAB — SARS-COV-2 N GENE NPH QL NAA+PROBE: NOT DETECTED

## 2020-09-27 NOTE — ED PROVIDER NOTE - PSH
LOC: The patient is awake, alert and is behaving appropriately for age.  APPEARANCE: Patient resting comfortably and in no acute distress, patient is clean and well groomed, patient's clothing is properly fastened.  SKIN: The skin is warm and dry, color consistent with ethnicity, patient has normal skin turgor and moist mucus membranes, skin intact, no breakdown or bruising noted. Denies diaphoresis   MUSCULOSKELETAL: Patient moving all extremities well, no obvious swelling nor deformities noted.   RESPIRATORY: Airway is open and patent, respirations are spontaneous, patient has a normal effort and rate, no accessory muscle use noted. Lung sounds clear throughout all fields. Denies productive cough  CARDIAC: Patient has a normal rate, no periphreal edema noted, capillary refill < 3 seconds.   ABDOMEN: Soft and non tender to palpation, no distention noted. Bowel sounds present in all quads. Denies vomiting, diarrhea/constipation, hematuria  Reports crying with urination when wearing a pullup but does not dry when urinating while sitting on the toilet since yesterday.   NEUROLOGIC: PERRL, 2mm bilaterally, eyes open spontaneously, behavior appropriate to situation, follows commands, facial expression symmetrical, bilateral hand grasp equal and even, purposeful motor response noted, normal sensation in all extremities when touched with a finger.  
Specimen cup and wipe provided to grandmother, explained need for urine specimen and given pt potty training will allow attempts to provide specimen and avoid other methods of collection.   
H/O abdominal hysterectomy    H/O tubal ligation

## 2020-09-28 ENCOUNTER — APPOINTMENT (OUTPATIENT)
Dept: PULMONOLOGY | Facility: CLINIC | Age: 80
End: 2020-09-28
Payer: MEDICARE

## 2020-09-28 VITALS
SYSTOLIC BLOOD PRESSURE: 157 MMHG | OXYGEN SATURATION: 97 % | TEMPERATURE: 98 F | DIASTOLIC BLOOD PRESSURE: 81 MMHG | HEIGHT: 62 IN | WEIGHT: 116 LBS | BODY MASS INDEX: 21.35 KG/M2 | HEART RATE: 82 BPM | RESPIRATION RATE: 16 BRPM

## 2020-09-28 DIAGNOSIS — G47.00 INSOMNIA, UNSPECIFIED: ICD-10-CM

## 2020-09-28 PROCEDURE — 94729 DIFFUSING CAPACITY: CPT

## 2020-09-28 PROCEDURE — 94750: CPT

## 2020-09-28 PROCEDURE — 36415 COLL VENOUS BLD VENIPUNCTURE: CPT

## 2020-09-28 PROCEDURE — ZZZZZ: CPT

## 2020-09-28 PROCEDURE — 94726 PLETHYSMOGRAPHY LUNG VOLUMES: CPT

## 2020-09-28 PROCEDURE — 94010 BREATHING CAPACITY TEST: CPT

## 2020-09-28 PROCEDURE — 99204 OFFICE O/P NEW MOD 45 MIN: CPT | Mod: 25

## 2020-09-29 LAB
ALBUMIN SERPL ELPH-MCNC: 4.7 G/DL
ALP BLD-CCNC: 67 U/L
ALT SERPL-CCNC: 36 U/L
ANION GAP SERPL CALC-SCNC: 13 MMOL/L
AST SERPL-CCNC: 21 U/L
BILIRUB SERPL-MCNC: 0.2 MG/DL
BUN SERPL-MCNC: 13 MG/DL
CALCIUM SERPL-MCNC: 10 MG/DL
CHLORIDE SERPL-SCNC: 93 MMOL/L
CO2 SERPL-SCNC: 24 MMOL/L
CREAT SERPL-MCNC: 0.7 MG/DL
GLUCOSE SERPL-MCNC: 96 MG/DL
POTASSIUM SERPL-SCNC: 4.3 MMOL/L
PROT SERPL-MCNC: 7 G/DL
SODIUM SERPL-SCNC: 131 MMOL/L

## 2020-09-30 PROBLEM — G47.00 INSOMNIA: Status: ACTIVE | Noted: 2017-10-05

## 2020-09-30 NOTE — PROCEDURE
[FreeTextEntry1] : normal flow rates\par volume\par moderate reduced dlco- mild when correted for va\par \par \par

## 2020-09-30 NOTE — HISTORY OF PRESENT ILLNESS
[Never] : never [TextBox_4] : TORY BROOKS is a 80 year old female who presents with her daughter in law for insomnia\par \par She has difficulty with her sleep since menopause- taking multiple OTC to help with sleep onset\par but per daughter- difficutly sleeping has been exacerbating since patient lost her \par She lives alone- states she is awake for hours/ days\par has seen sleep docs in the past- has been on other sleep agents: ambien, trazadone, rozerem, remeron, doxepin, unisom\par melatonin, sonata, mealtonin, belsomra\par self discontinued after a few weeks when she felt it wasn’t working or when she was anxious about side effects\par \par she has never had a sleep study. reports some snoring reported by her \par she does not know about apneas/ gasping for air. + morning headches she attribues to no sleep\par + GI upset/ abdominal distention she attributes to stress\par + anxiety feelings over the bed/bedtime\par she will lay in bed for a few min/hours- get out of bed and do "other activities" may eat overnigh and then try to go abck to sleep\par denies napping during the day\par has not tried to sleep in a different bedroom or environment for concern she would not fall asleep anyway\par no signiifcant change in weight\par no parsomonias on different sleep aides\par \par + clock watching\par + anxiety toward bed/ bedtime\par + snoring\par denies RLS\par rosio parasomnias.\par \par + family history of insomnia \par

## 2020-09-30 NOTE — PHYSICAL EXAM
[No Acute Distress] : no acute distress [Normal Oropharynx] : normal oropharynx [Normal Appearance] : normal appearance [No Neck Mass] : no neck mass [Normal Rate/Rhythm] : normal rate/rhythm [Normal S1, S2] : normal s1, s2 [No Murmurs] : no murmurs [No Resp Distress] : no resp distress [Clear to Auscultation Bilaterally] : clear to auscultation bilaterally [TextBox_140] : depressed/ anxious

## 2020-09-30 NOTE — ASSESSMENT
[FreeTextEntry1] : Insomnia\par restart doxepin\par no HST- would only exacerbate her sleep difficulties\par sleep in different environment\par no eating over night\par CBTI info given\par \par avoid driving when sleepy\par \par refused flu & prevnar today\par

## 2020-10-01 ENCOUNTER — NON-APPOINTMENT (OUTPATIENT)
Age: 80
End: 2020-10-01

## 2020-10-05 ENCOUNTER — NON-APPOINTMENT (OUTPATIENT)
Age: 80
End: 2020-10-05

## 2020-10-11 ENCOUNTER — TRANSCRIPTION ENCOUNTER (OUTPATIENT)
Age: 80
End: 2020-10-11

## 2020-10-28 ENCOUNTER — APPOINTMENT (OUTPATIENT)
Dept: UROLOGY | Facility: CLINIC | Age: 80
End: 2020-10-28

## 2020-11-10 ENCOUNTER — NON-APPOINTMENT (OUTPATIENT)
Age: 80
End: 2020-11-10

## 2020-12-01 ENCOUNTER — EMERGENCY (EMERGENCY)
Facility: HOSPITAL | Age: 80
LOS: 1 days | Discharge: HOME | End: 2020-12-01
Attending: EMERGENCY MEDICINE | Admitting: EMERGENCY MEDICINE
Payer: MEDICARE

## 2020-12-01 VITALS
TEMPERATURE: 98 F | SYSTOLIC BLOOD PRESSURE: 128 MMHG | DIASTOLIC BLOOD PRESSURE: 75 MMHG | OXYGEN SATURATION: 99 % | RESPIRATION RATE: 18 BRPM | HEIGHT: 62 IN | HEART RATE: 72 BPM

## 2020-12-01 VITALS
OXYGEN SATURATION: 97 % | TEMPERATURE: 98 F | SYSTOLIC BLOOD PRESSURE: 196 MMHG | RESPIRATION RATE: 18 BRPM | HEART RATE: 76 BPM | DIASTOLIC BLOOD PRESSURE: 84 MMHG

## 2020-12-01 DIAGNOSIS — Z98.51 TUBAL LIGATION STATUS: Chronic | ICD-10-CM

## 2020-12-01 DIAGNOSIS — Z90.710 ACQUIRED ABSENCE OF BOTH CERVIX AND UTERUS: Chronic | ICD-10-CM

## 2020-12-01 LAB
ALBUMIN SERPL ELPH-MCNC: 4 G/DL — SIGNIFICANT CHANGE UP (ref 3.3–5)
ALP SERPL-CCNC: 60 U/L — SIGNIFICANT CHANGE UP (ref 40–120)
ALT FLD-CCNC: 46 U/L — HIGH (ref 4–33)
ANION GAP SERPL CALC-SCNC: 12 MMO/L — SIGNIFICANT CHANGE UP (ref 7–14)
APPEARANCE UR: CLEAR — SIGNIFICANT CHANGE UP
AST SERPL-CCNC: 27 U/L — SIGNIFICANT CHANGE UP (ref 4–32)
BACTERIA # UR AUTO: NEGATIVE — SIGNIFICANT CHANGE UP
BASOPHILS # BLD AUTO: 0.01 K/UL — SIGNIFICANT CHANGE UP (ref 0–0.2)
BASOPHILS NFR BLD AUTO: 0.1 % — SIGNIFICANT CHANGE UP (ref 0–2)
BILIRUB SERPL-MCNC: 0.3 MG/DL — SIGNIFICANT CHANGE UP (ref 0.2–1.2)
BILIRUB UR-MCNC: NEGATIVE — SIGNIFICANT CHANGE UP
BLOOD UR QL VISUAL: SIGNIFICANT CHANGE UP
BUN SERPL-MCNC: 17 MG/DL — SIGNIFICANT CHANGE UP (ref 7–23)
CALCIUM SERPL-MCNC: 9.3 MG/DL — SIGNIFICANT CHANGE UP (ref 8.4–10.5)
CHLORIDE SERPL-SCNC: 94 MMOL/L — LOW (ref 98–107)
CO2 SERPL-SCNC: 24 MMOL/L — SIGNIFICANT CHANGE UP (ref 22–31)
COLOR SPEC: SIGNIFICANT CHANGE UP
CREAT SERPL-MCNC: 0.73 MG/DL — SIGNIFICANT CHANGE UP (ref 0.5–1.3)
EOSINOPHIL # BLD AUTO: 0.03 K/UL — SIGNIFICANT CHANGE UP (ref 0–0.5)
EOSINOPHIL NFR BLD AUTO: 0.3 % — SIGNIFICANT CHANGE UP (ref 0–6)
GLUCOSE SERPL-MCNC: 97 MG/DL — SIGNIFICANT CHANGE UP (ref 70–99)
GLUCOSE UR-MCNC: NEGATIVE — SIGNIFICANT CHANGE UP
HCT VFR BLD CALC: 35.8 % — SIGNIFICANT CHANGE UP (ref 34.5–45)
HGB BLD-MCNC: 12.2 G/DL — SIGNIFICANT CHANGE UP (ref 11.5–15.5)
HYALINE CASTS # UR AUTO: SIGNIFICANT CHANGE UP
IMM GRANULOCYTES NFR BLD AUTO: 0.4 % — SIGNIFICANT CHANGE UP (ref 0–1.5)
KETONES UR-MCNC: SIGNIFICANT CHANGE UP
LEUKOCYTE ESTERASE UR-ACNC: SIGNIFICANT CHANGE UP
LYMPHOCYTES # BLD AUTO: 2.19 K/UL — SIGNIFICANT CHANGE UP (ref 1–3.3)
LYMPHOCYTES # BLD AUTO: 23.7 % — SIGNIFICANT CHANGE UP (ref 13–44)
MCHC RBC-ENTMCNC: 30.2 PG — SIGNIFICANT CHANGE UP (ref 27–34)
MCHC RBC-ENTMCNC: 34.1 % — SIGNIFICANT CHANGE UP (ref 32–36)
MCV RBC AUTO: 88.6 FL — SIGNIFICANT CHANGE UP (ref 80–100)
MONOCYTES # BLD AUTO: 0.66 K/UL — SIGNIFICANT CHANGE UP (ref 0–0.9)
MONOCYTES NFR BLD AUTO: 7.2 % — SIGNIFICANT CHANGE UP (ref 2–14)
NEUTROPHILS # BLD AUTO: 6.3 K/UL — SIGNIFICANT CHANGE UP (ref 1.8–7.4)
NEUTROPHILS NFR BLD AUTO: 68.3 % — SIGNIFICANT CHANGE UP (ref 43–77)
NITRITE UR-MCNC: NEGATIVE — SIGNIFICANT CHANGE UP
NRBC # FLD: 0 K/UL — SIGNIFICANT CHANGE UP (ref 0–0)
PH UR: 6.5 — SIGNIFICANT CHANGE UP (ref 5–8)
PLATELET # BLD AUTO: 331 K/UL — SIGNIFICANT CHANGE UP (ref 150–400)
PMV BLD: 9 FL — SIGNIFICANT CHANGE UP (ref 7–13)
POTASSIUM SERPL-MCNC: 3.4 MMOL/L — LOW (ref 3.5–5.3)
POTASSIUM SERPL-SCNC: 3.4 MMOL/L — LOW (ref 3.5–5.3)
PROT SERPL-MCNC: 6.6 G/DL — SIGNIFICANT CHANGE UP (ref 6–8.3)
PROT UR-MCNC: 10 — SIGNIFICANT CHANGE UP
RBC # BLD: 4.04 M/UL — SIGNIFICANT CHANGE UP (ref 3.8–5.2)
RBC # FLD: 12.7 % — SIGNIFICANT CHANGE UP (ref 10.3–14.5)
RBC CASTS # UR COMP ASSIST: SIGNIFICANT CHANGE UP (ref 0–?)
SODIUM SERPL-SCNC: 130 MMOL/L — LOW (ref 135–145)
SP GR SPEC: 1.01 — SIGNIFICANT CHANGE UP (ref 1–1.04)
SQUAMOUS # UR AUTO: SIGNIFICANT CHANGE UP
UROBILINOGEN FLD QL: NORMAL — SIGNIFICANT CHANGE UP
WBC # BLD: 9.23 K/UL — SIGNIFICANT CHANGE UP (ref 3.8–10.5)
WBC # FLD AUTO: 9.23 K/UL — SIGNIFICANT CHANGE UP (ref 3.8–10.5)
WBC UR QL: HIGH (ref 0–?)

## 2020-12-01 PROCEDURE — 99285 EMERGENCY DEPT VISIT HI MDM: CPT | Mod: GC

## 2020-12-01 PROCEDURE — 74176 CT ABD & PELVIS W/O CONTRAST: CPT | Mod: 26

## 2020-12-01 PROCEDURE — 73120 X-RAY EXAM OF HAND: CPT | Mod: 26,LT

## 2020-12-01 PROCEDURE — 70450 CT HEAD/BRAIN W/O DYE: CPT | Mod: 26

## 2020-12-01 PROCEDURE — 73100 X-RAY EXAM OF WRIST: CPT | Mod: 26,LT

## 2020-12-01 RX ORDER — SODIUM CHLORIDE 9 MG/ML
1000 INJECTION INTRAMUSCULAR; INTRAVENOUS; SUBCUTANEOUS ONCE
Refills: 0 | Status: COMPLETED | OUTPATIENT
Start: 2020-12-01 | End: 2020-12-01

## 2020-12-01 RX ADMIN — SODIUM CHLORIDE 1000 MILLILITER(S): 9 INJECTION INTRAMUSCULAR; INTRAVENOUS; SUBCUTANEOUS at 16:44

## 2020-12-01 NOTE — ED PROVIDER NOTE - CCCP TRG CHIEF CMPLNT
had episode of passing out this am has left wrist injury with ecchymosis and back pain  has has recent UTI/syncope

## 2020-12-01 NOTE — ED PROVIDER NOTE - PHYSICAL EXAMINATION
GENERAL: Patient awake alert NAD.  HEENT: NC/AT, Moist mucous membranes, PERRL, EOMI, no nystagmus.  LUNGS: CTAB, no wheezes or crackles.  CARDIAC: RRR, no m/r/g.  ABDOMEN: Soft, NT, ND, No rebound, guarding.  EXT: No edema. No calf tenderness. CV 2+DP/PT bilaterally.  MSK: No pain with movement, no deformities.  NEURO: A&Ox3. Moving all extremities.  SKIN: Warm and dry. No rash.  PSYCH: Normal affect.

## 2020-12-01 NOTE — ED ADULT NURSE NOTE - NS ED NOTE  TALK SOMEONE YN
No Skin Substitute Paste Text: The defect edges were debeveled with a #15 scalpel blade.  Given the location of the defect, shape of the defect and the proximity to free margins a skin substitute micronized graft was deemed most appropriate.  The entire vial contents were admixed with 0.5ccs of sterile saline, formed into a paste and then evenly spread over the entire wound bed.

## 2020-12-01 NOTE — ED PROVIDER NOTE - NSFOLLOWUPINSTRUCTIONS_ED_ALL_ED_FT
Syncope    Syncope is when you temporarily lose consciousness, also called fainting or passing out. It is caused by a sudden decrease in blood flow to the brain. Even though most causes of syncope are not dangerous, syncope can possibly be a sign of a serious medical problem. Signs that you may be about to faint include feeling dizzy, lightheaded, nausea, visual changes, or cold/clammy skin. Do not drive, operate heavy machinery, or play sports until your health care provider says it is okay.    SEEK IMMEDIATE MEDICAL CARE IF YOU HAVE ANY OF THE FOLLOWING SYMPTOMS: severe headache, pain in your chest/abdomen/back, bleeding from your mouth or rectum, palpitations, shortness of breath, pain with breathing, seizure, confusion, or trouble walking.    Please continue to eat a regular diet and stay hydrated. Please follow up with your primary care doctor within 1-2 weeks. Please follow up at the [facility] for further psychiatric evaluation and care.

## 2020-12-01 NOTE — PROVIDER CONTACT NOTE (OTHER) - ASSESSMENT
Cherie spoke with son, Khoi HAWKINS#[phone] and pt regarding what pt's history.  Pt resides home alone in a coop.  Pt has Insomnia.  recently had UTI and Hyponatremia.  Pt had fallen 5-6 months ago but didn't really f/u with cardiologist after finding she was hyponatremic.  Pt is stopped taking her antianxiety meds 4-5 months ago b/c it was not working anymore.  son states that pt is paranoid and feels that something is out to get her, people are listening into phone conversations.  Pt has decreased her eating and fluid intake.  son Khoi is HCP and oldest child.    3years ago.  Pt has stopped driving due to not sleeping.

## 2020-12-01 NOTE — ED ADULT NURSE NOTE - CHIEF COMPLAINT QUOTE
c/o abd pain and constipation and feeling bloated  has nt been eating normally for 2 months has had a low sodium level and is not sleeping (ju son ) pt arrives A&ox3-4 pt has been thinking she is going lose everything

## 2020-12-01 NOTE — ED PROVIDER NOTE - OBJECTIVE STATEMENT
80 y.o. F p/w syncope and LOC.  Pt. woke up and was walking to her kitchen she fell down, hit her head, and lost consciousness.  Pt. states this happened because she hadn't eaten in a few days as she hasn't had a BM in 4 days.  Usually BM every 2-3 days.  Per son, pt. has been having increasing paranoia over the last few months thinking people are poisoning her food, leading her to not eat.  Per son, pt. has become progressively weak as well.  Denies any f/c, sick contacts, CP, SOB, abd pain, n/v/d/c, dizziness, lightheadedness, urinary sxs.  Pt. states she feels well now and does not have any sxs.  No blood thinner use.

## 2020-12-01 NOTE — ED PROVIDER NOTE - PATIENT PORTAL LINK FT
You can access the FollowMyHealth Patient Portal offered by [facility] by registering at the following website: [email or web address]. By joining [facility]’s FollowMyHealth portal, you will also be able to view your health information using other applications (apps) compatible with our system.

## 2020-12-01 NOTE — ED ADULT NURSE NOTE - OBJECTIVE STATEMENT
Pt received Aox4, ambulatory at baseline presents to the ED s/p fall earlier today. Pt states she felt dizzy, blacked out, and fell on the floor backwards, hitting her head, her back, and her left hand. Redness and ecchymosis noted to the left hand. Pt denies pain at the moment. Pt states she was getting up to go the kitchen. Pt states she has been having the intermittent dizziness for the past couple of months. Pt states she fell a couple of months ago but was told by MD that it was due to hyponatremia. Pt states she has been feeling nauseous, no episodes of vomiting. Pt denies Cp, SOB, diarrhea, chills, fever, cough, dizziness, changes in vision, dysuria at the moment. Pt denies blood thinner usage. Pt denies auditory and visual hallucinations at the moment. Pt denies homicidal, suicidal ideations. Pt breathing even and unlabored, saturating 100% on RA at the moment. Pt received Aox4, ambulatory at baseline presents to the ED s/p fall earlier today. Pt states she felt dizzy, blacked out, and fell on the floor backwards, hitting her head, her back, and her left hand. Redness and ecchymosis noted to the left hand. Pt denies pain at the moment. Pt states she was getting up to go the kitchen. Pt states she has been having the intermittent dizziness for the past couple of months. Pt states she fell a couple of months ago but was told by MD that it was due to hyponatremia. Pt states she has been feeling nauseous, no episodes of vomiting. Pt states she has not been eating much lately due to insomnia. Pt denies Cp, SOB, diarrhea, chills, fever, cough, dizziness, changes in vision, dysuria at the moment. Pt denies blood thinner usage. Pt denies auditory and visual hallucinations at the moment. Pt denies homicidal, suicidal ideations. Pt breathing even and unlabored, saturating 100% on RA at the moment. Pt received Aox4, ambulatory at baseline presents to the ED s/p fall earlier today. Pt states she felt dizzy, blacked out, and fell on the floor backwards, hitting her head, her back, and her left hand. Redness and ecchymosis noted to the left hand. Pt denies pain at the moment. Pt states she was getting up to go the kitchen. Pt states she has been having the intermittent dizziness for the past couple of months. Pt states she fell a couple of months ago but was told by MD that it was due to hyponatremia. Pt states she has been feeling nauseous, no episodes of vomiting. Pt states she has not been eating much lately due to insomnia. Pt also states she has been constipated, last normal BM 4 days ago.  Pt denies Cp, SOB, diarrhea, chills, fever, cough, dizziness, changes in vision, dysuria at the moment. Pt denies blood thinner usage. Pt denies auditory and visual hallucinations at the moment. Pt denies homicidal, suicidal ideations. Pt breathing even and unlabored, saturating 100% on RA at the moment.

## 2020-12-01 NOTE — ED ADULT NURSE NOTE - INTERVENTIONS DEFINITIONS
Stretcher in lowest position, wheels locked, appropriate side rails in place/Call bell, personal items and telephone within reach/[place] to call system/Non-slip footwear when patient is off stretcher/Physically safe environment: no spills, clutter or unnecessary equipment/Monitor for mental status changes and reorient to person, place, and time/Review medications for side effects contributing to fall risk/Reinforce activity limits and safety measures with patient and family/Instruct patient to call for assistance/Room bathroom lighting operational/Monitor gait and stability

## 2020-12-01 NOTE — ED PROVIDER NOTE - ATTENDING CONTRIBUTION TO CARE
Dr. Villa:  I have personally performed a face to face bedside history and physical examination of this patient. I have discussed the history, examination, review of systems, assessment and plan of management with the resident. I have reviewed the electronic medical record and amended it to reflect my history, review of systems, physical exam, assessment and plan.    80F h/o HTN sent in by son for possible near-syncopal fall this morning with head trauma.  +Constipation over past several days and decreased PO intake over past two months.  Also with left wrist pain.  Pt reluctant to have any work up.    Exam:  - nad  - +ecchymosis to left wrist  - rrr   -ctab   -abd soft ntnd    A/P  - fall, eval trauma; constipation dn decreased PO, eval abd pathology  - basic labs, CT head, CT abd/pelvis, XRay wrist

## 2020-12-01 NOTE — PROVIDER CONTACT NOTE (OTHER) - BACKGROUND
pt is a 81yo  female who was brought to [facility] ED due to fainting and fall this morning.  son also reports that pt has memory loss that has increased over the past few weeks.  Pt is A+OX3 not date

## 2020-12-01 NOTE — ED ADULT TRIAGE NOTE - CHIEF COMPLAINT QUOTE
c/o abd pain and constipation and feeling bloated  has nt been eating normally for 2 months (ju son ) c/o abd pain and constipation and feeling bloated  has nt been eating normally for 2 months (ju son ) pt arrives A&ox3-4 pt has been thinking she is going lose everything c/o abd pain and constipation and feeling bloated  has nt been eating normally for 2 months has had a low sodium level and is not sleeping (ju son ) pt arrives A&ox3-4 pt has been thinking she is going lose everything

## 2020-12-01 NOTE — ED PROVIDER NOTE - CLINICAL SUMMARY MEDICAL DECISION MAKING FREE TEXT BOX
80 F p/w syncope.  Neuro exam unremarkable.  Concerned for cardiac etiology of syncope.  VSS.  Will get basics, ekg, CTh, administer fluids.  Will get CT abdomen/pelvis given constipation to r/o obstruction.  Pt. states she does not want to stay, however, will have final discussion on dispo after results come in. 80 F p/w syncope.  Neuro exam unremarkable.  Concerned for cardiac etiology of syncope.  VSS.  Will get basics, ekg, CTH, administer fluids.  Will get CT abdomen/pelvis given constipation to r/o obstruction. Pt. states she does not want to stay, however, will have final discussion on dispo after results come in.    Addendum 12/1 8:26 PM: EKG, labs, CTH and CT A/P results unrevealing for acute pathology. Informed patient that we would like to keep her in hospital for additional w/u of syncope, however pt reported symptom improvement and is unwilling to stay at this time. Discussed with patient's son Khoi who also is agreeable to have patient follow up with her PCP. Information for [facility] given for psych followup.

## 2020-12-01 NOTE — ED PROVIDER NOTE - PROGRESS NOTE DETAILS
[facility] PGY2 - Pt. refusing contrast stating she's had a reaction to it before.  Pt. amenable to CTh and abdomen w/o contrast. Patient seen and evaluated for fall as well as decreased PO intake. EKG wnl. CT scan of head and abdomen unremarkable for any acute pathology. No intracranial hemorrhage or fracture. No bowel obstruction. Patient recommended to stay for further workup of syncopal episode, however refusing to stay in hospital and states she "feels well" and wishes to return home.

## 2020-12-01 NOTE — ED ADULT TRIAGE NOTE - CCCP TRG CHIEF CMPLNT
syncope/had episode of passing out this am has left wrist injury with ecchymosis and back pain  has has recent UTI

## 2020-12-01 NOTE — ED PROVIDER NOTE - NSFOLLOWUPCLINICS_GEN_ALL_ED_FT
[facility]  Behavioral Health  [address] [place] [address]  Phone: [phone]  Fax:   Follow Up Time:

## 2020-12-03 ENCOUNTER — NON-APPOINTMENT (OUTPATIENT)
Age: 80
End: 2020-12-03

## 2020-12-03 LAB
CULTURE RESULTS: SIGNIFICANT CHANGE UP
SPECIMEN SOURCE: SIGNIFICANT CHANGE UP

## 2020-12-10 ENCOUNTER — APPOINTMENT (OUTPATIENT)
Dept: GERIATRICS | Facility: CLINIC | Age: 80
End: 2020-12-10
Payer: MEDICARE

## 2020-12-10 VITALS
TEMPERATURE: 97.3 F | HEIGHT: 62 IN | WEIGHT: 109.38 LBS | OXYGEN SATURATION: 99 % | BODY MASS INDEX: 20.13 KG/M2 | RESPIRATION RATE: 14 BRPM | DIASTOLIC BLOOD PRESSURE: 60 MMHG | HEART RATE: 94 BPM | SYSTOLIC BLOOD PRESSURE: 110 MMHG

## 2020-12-10 DIAGNOSIS — Z60.2 PROBLEMS RELATED TO LIVING ALONE: ICD-10-CM

## 2020-12-10 PROCEDURE — 99204 OFFICE O/P NEW MOD 45 MIN: CPT

## 2020-12-10 RX ORDER — LORAZEPAM 0.5 MG/1
0.5 TABLET ORAL
Qty: 90 | Refills: 0 | Status: DISCONTINUED | COMMUNITY
Start: 2020-04-16 | End: 2020-12-10

## 2020-12-10 RX ORDER — DOXEPIN 6 MG/1
6 TABLET, FILM COATED ORAL AT BEDTIME
Qty: 30 | Refills: 0 | Status: DISCONTINUED | COMMUNITY
Start: 2020-09-28 | End: 2020-12-10

## 2020-12-10 SDOH — SOCIAL STABILITY - SOCIAL INSECURITY: PROBLEMS RELATED TO LIVING ALONE: Z60.2

## 2020-12-10 NOTE — REVIEW OF SYSTEMS
[Recent Weight Loss (___ Lbs)] : recent [unfilled] ~Ulb weight loss [Abdominal Pain] : abdominal pain [Constipation] : constipation [As Noted in HPI] : as noted in HPI [Negative] : Heme/Lymph [Confused] : no confusion [Convulsions] : no convulsions [Dizziness] : no dizziness [Fainting] : no fainting [Limb Weakness] : no limb weakness [Difficulty Walking] : no difficulty walking

## 2020-12-10 NOTE — REASON FOR VISIT
[Initial Evaluation] : an initial evaluation [Family Member] : family member [FreeTextEntry1] : Pt presents for CGE

## 2020-12-10 NOTE — PHYSICAL EXAM
[General Appearance - Alert] : alert [General Appearance - In No Acute Distress] : in no acute distress [General Appearance - Well-Appearing] : healthy appearing [Sclera] : the sclera and conjunctiva were normal [PERRL With Normal Accommodation] : pupils were equal in size, round, and reactive to light [Extraocular Movements] : extraocular movements were intact [Strabismus] : no strabismus was seen [No Oral Pallor] : no oral pallor [No Oral Cyanosis] : no oral cyanosis [Outer Ear] : the ears and nose were normal in appearance [Examination Of The Oral Cavity] : the lips and gums were normal [Both Tympanic Membranes Were Examined] : both tympanic membranes were normal [Neck Cervical Mass (___cm)] : no neck mass was observed [Jugular Venous Distention Increased] : there was no jugular-venous distention [Auscultation Breath Sounds / Voice Sounds] : lungs were clear to auscultation bilaterally [Heart Sounds] : normal S1 and S2 [Edema] : there was no peripheral edema [Bowel Sounds] : normal bowel sounds [Abdomen Soft] : soft [Abdomen Tenderness] : non-tender [Cervical Lymph Nodes Enlarged Posterior Bilaterally] : posterior cervical [Cervical Lymph Nodes Enlarged Anterior Bilaterally] : anterior cervical [Supraclavicular Lymph Nodes Enlarged Bilaterally] : supraclavicular [Axillary Lymph Nodes Enlarged Bilaterally] : axillary [Involuntary Movements] : no involuntary movements were seen [] : no rash [No Focal Deficits] : no focal deficits [___/1] : [unfilled]/1   [FreeTextEntry1] : On MMSE patient scored 27/30. She was unable to perform serial sevens but immediate and delayed recall intact. She was oriented to year month season day and date. Normal clock drawing test.

## 2020-12-11 ENCOUNTER — INPATIENT (INPATIENT)
Facility: HOSPITAL | Age: 80
LOS: 4 days | Discharge: PSYCHIATRIC OR SUBSTANCE USE FACILITY | End: 2020-12-16
Attending: INTERNAL MEDICINE | Admitting: INTERNAL MEDICINE
Payer: MEDICARE

## 2020-12-11 VITALS
HEIGHT: 62 IN | RESPIRATION RATE: 17 BRPM | HEART RATE: 100 BPM | SYSTOLIC BLOOD PRESSURE: 141 MMHG | OXYGEN SATURATION: 100 % | TEMPERATURE: 98 F | DIASTOLIC BLOOD PRESSURE: 76 MMHG

## 2020-12-11 DIAGNOSIS — R41.82 ALTERED MENTAL STATUS, UNSPECIFIED: ICD-10-CM

## 2020-12-11 DIAGNOSIS — F29 UNSPECIFIED PSYCHOSIS NOT DUE TO A SUBSTANCE OR KNOWN PHYSIOLOGICAL CONDITION: ICD-10-CM

## 2020-12-11 DIAGNOSIS — Z98.51 TUBAL LIGATION STATUS: Chronic | ICD-10-CM

## 2020-12-11 DIAGNOSIS — Z90.710 ACQUIRED ABSENCE OF BOTH CERVIX AND UTERUS: Chronic | ICD-10-CM

## 2020-12-11 LAB
ALBUMIN SERPL ELPH-MCNC: 4.1 G/DL — SIGNIFICANT CHANGE UP (ref 3.3–5)
ALP SERPL-CCNC: 67 U/L — SIGNIFICANT CHANGE UP (ref 40–120)
ALT FLD-CCNC: 22 U/L — SIGNIFICANT CHANGE UP (ref 4–33)
ANION GAP SERPL CALC-SCNC: 14 MMOL/L — SIGNIFICANT CHANGE UP (ref 7–14)
APPEARANCE UR: CLEAR — SIGNIFICANT CHANGE UP
AST SERPL-CCNC: 18 U/L — SIGNIFICANT CHANGE UP (ref 4–32)
BASOPHILS # BLD AUTO: 0.06 K/UL — SIGNIFICANT CHANGE UP (ref 0–0.2)
BASOPHILS NFR BLD AUTO: 0.3 % — SIGNIFICANT CHANGE UP (ref 0–2)
BILIRUB SERPL-MCNC: 0.3 MG/DL — SIGNIFICANT CHANGE UP (ref 0.2–1.2)
BILIRUB UR-MCNC: NEGATIVE — SIGNIFICANT CHANGE UP
BUN SERPL-MCNC: 15 MG/DL — SIGNIFICANT CHANGE UP (ref 7–23)
CALCIUM SERPL-MCNC: 9.6 MG/DL — SIGNIFICANT CHANGE UP (ref 8.4–10.5)
CHLORIDE SERPL-SCNC: 93 MMOL/L — LOW (ref 98–107)
CO2 SERPL-SCNC: 25 MMOL/L — SIGNIFICANT CHANGE UP (ref 22–31)
COLOR SPEC: SIGNIFICANT CHANGE UP
CREAT SERPL-MCNC: 0.66 MG/DL — SIGNIFICANT CHANGE UP (ref 0.5–1.3)
DIFF PNL FLD: NEGATIVE — SIGNIFICANT CHANGE UP
EOSINOPHIL # BLD AUTO: 0.03 K/UL — SIGNIFICANT CHANGE UP (ref 0–0.5)
EOSINOPHIL NFR BLD AUTO: 0.2 % — SIGNIFICANT CHANGE UP (ref 0–6)
GLUCOSE SERPL-MCNC: 102 MG/DL — HIGH (ref 70–99)
GLUCOSE UR QL: NEGATIVE — SIGNIFICANT CHANGE UP
HCT VFR BLD CALC: 39.9 % — SIGNIFICANT CHANGE UP (ref 34.5–45)
HGB BLD-MCNC: 12.7 G/DL — SIGNIFICANT CHANGE UP (ref 11.5–15.5)
IANC: 15.19 K/UL — HIGH (ref 1.5–8.5)
IMM GRANULOCYTES NFR BLD AUTO: 1.3 % — SIGNIFICANT CHANGE UP (ref 0–1.5)
KETONES UR-MCNC: NEGATIVE — SIGNIFICANT CHANGE UP
LEUKOCYTE ESTERASE UR-ACNC: ABNORMAL
LYMPHOCYTES # BLD AUTO: 11.4 % — LOW (ref 13–44)
LYMPHOCYTES # BLD AUTO: 2.13 K/UL — SIGNIFICANT CHANGE UP (ref 1–3.3)
MCHC RBC-ENTMCNC: 28.5 PG — SIGNIFICANT CHANGE UP (ref 27–34)
MCHC RBC-ENTMCNC: 31.8 GM/DL — LOW (ref 32–36)
MCV RBC AUTO: 89.7 FL — SIGNIFICANT CHANGE UP (ref 80–100)
MONOCYTES # BLD AUTO: 1.01 K/UL — HIGH (ref 0–0.9)
MONOCYTES NFR BLD AUTO: 5.4 % — SIGNIFICANT CHANGE UP (ref 2–14)
NEUTROPHILS # BLD AUTO: 15.19 K/UL — HIGH (ref 1.8–7.4)
NEUTROPHILS NFR BLD AUTO: 81.4 % — HIGH (ref 43–77)
NITRITE UR-MCNC: NEGATIVE — SIGNIFICANT CHANGE UP
NRBC # BLD: 0 /100 WBCS — SIGNIFICANT CHANGE UP
NRBC # FLD: 0 K/UL — SIGNIFICANT CHANGE UP
PCP SPEC-MCNC: SIGNIFICANT CHANGE UP
PH UR: 7.5 — SIGNIFICANT CHANGE UP (ref 5–8)
PLATELET # BLD AUTO: 520 K/UL — HIGH (ref 150–400)
POTASSIUM SERPL-MCNC: 3.2 MMOL/L — LOW (ref 3.5–5.3)
POTASSIUM SERPL-SCNC: 3.2 MMOL/L — LOW (ref 3.5–5.3)
PROT SERPL-MCNC: 7.4 G/DL — SIGNIFICANT CHANGE UP (ref 6–8.3)
PROT UR-MCNC: ABNORMAL
RBC # BLD: 4.45 M/UL — SIGNIFICANT CHANGE UP (ref 3.8–5.2)
RBC # FLD: 12.8 % — SIGNIFICANT CHANGE UP (ref 10.3–14.5)
SARS-COV-2 RNA SPEC QL NAA+PROBE: SIGNIFICANT CHANGE UP
SODIUM SERPL-SCNC: 132 MMOL/L — LOW (ref 135–145)
SP GR SPEC: 1.01 — LOW (ref 1.01–1.02)
TOXICOLOGY SCREEN, DRUGS OF ABUSE, SERUM RESULT: SIGNIFICANT CHANGE UP
TSH SERPL-MCNC: 2.4 UIU/ML — SIGNIFICANT CHANGE UP (ref 0.27–4.2)
TSH SERPL-MCNC: 2.42 UIU/ML — SIGNIFICANT CHANGE UP (ref 0.27–4.2)
UROBILINOGEN FLD QL: SIGNIFICANT CHANGE UP
WBC # BLD: 18.66 K/UL — HIGH (ref 3.8–10.5)
WBC # FLD AUTO: 18.66 K/UL — HIGH (ref 3.8–10.5)

## 2020-12-11 PROCEDURE — 99285 EMERGENCY DEPT VISIT HI MDM: CPT

## 2020-12-11 PROCEDURE — 70450 CT HEAD/BRAIN W/O DYE: CPT | Mod: 26

## 2020-12-11 PROCEDURE — 93010 ELECTROCARDIOGRAM REPORT: CPT

## 2020-12-11 PROCEDURE — 71046 X-RAY EXAM CHEST 2 VIEWS: CPT | Mod: 26

## 2020-12-11 RX ORDER — POTASSIUM CHLORIDE 20 MEQ
40 PACKET (EA) ORAL ONCE
Refills: 0 | Status: COMPLETED | OUTPATIENT
Start: 2020-12-11 | End: 2020-12-11

## 2020-12-11 RX ORDER — CEFTRIAXONE 500 MG/1
1000 INJECTION, POWDER, FOR SOLUTION INTRAMUSCULAR; INTRAVENOUS ONCE
Refills: 0 | Status: COMPLETED | OUTPATIENT
Start: 2020-12-11 | End: 2020-12-12

## 2020-12-11 NOTE — ED PROVIDER NOTE - CARE PLAN
Principal Discharge DX:	MDD (major depressive disorder)   Principal Discharge DX:	Altered mental status, unspecified  Secondary Diagnosis:	Psychosis, unspecified psychosis type  Secondary Diagnosis:	MDD (major depressive disorder)  Secondary Diagnosis:	UTI (urinary tract infection)

## 2020-12-11 NOTE — ED PROVIDER NOTE - CLINICAL SUMMARY MEDICAL DECISION MAKING FREE TEXT BOX
81 y/o F hx MDD, Overactive Bladder, Anxiety, HLD   Labs, Urine Tox/UA, EKG  Medical evaluation performed. There is no clinical evidence of intoxication or any acute medical problem requiring immediate intervention. Patient is awaiting psychiatric consultation. Final disposition will be determined by psychiatrist. 79 y/o F hx MDD, Overactive Bladder, Anxiety, HLD   Altered mental status  Labs, Urine Tox/UA, EKG, CT head, Chest X-ray.   Psychiatric evaluation   Admit to medicine 81 y/o F hx MDD, Overactive Bladder, Anxiety, HLD   Altered mental status  Labs, Urine Tox/UA, EKG, CT head, Chest X-ray.   Psychiatric evaluation   Admit for medical management

## 2020-12-11 NOTE — ED BEHAVIORAL HEALTH ASSESSMENT NOTE - CASE SUMMARY
80F with a history of depression presents for bizarre behavior. Patient is paranoid, not eating, with poor ADLs. Though of concern is recent syncopal episode, elevated WBC count, and noted cognitive deficits. Presentation is not classic for an acute depressive episode and would recommend further work up to rule out delirium. Would not recommend 1:1 on medical floor. 80F with a history of depression presents for bizarre behavior. Patient is paranoid, not eating, with poor ADLs. Though of concern is recent syncopal episode, elevated WBC count, and noted cognitive deficits. Presentation is not classic for an acute depressive episode and would recommend further work up to rule out delirium. Would not recommend 1:1 on medical floor..

## 2020-12-11 NOTE — ED ADULT NURSE REASSESSMENT NOTE - NS ED NURSE REASSESS COMMENT FT1
20 gauge IV placed to RAC Flushes without resistance, positive for blood return.
Received pt and report at change of shift from Promise RN. Pt is awake, laying in stretcher, a&ox3, calm and able to follow verbal command. PO fluids at bedside. IV access to be obtained for medical admission for AMS. Pt to be transferred to main ed once bed is available. Will continue to monitor for safety
Pt remains awake, alert, calm, laying in stretcher. Pt remains paranoid towards others, refusing PO fluids and offered meal. CThead completed, report given to [facility] RN. VSS. Awaiting transporter at this time. Will continue to monitor for safety.

## 2020-12-11 NOTE — ED ADULT NURSE NOTE - NSIMPLEMENTINTERV_GEN_ALL_ED
Implemented All Fall Risk Interventions:  [place] to call system. Call bell, personal items and telephone within reach. Instruct patient to call for assistance. Room bathroom lighting operational. Non-slip footwear when patient is off stretcher. Physically safe environment: no spills, clutter or unnecessary equipment. Stretcher in lowest position, wheels locked, appropriate side rails in place. Provide visual cue, wrist band, yellow gown, etc. Monitor gait and stability. Monitor for mental status changes and reorient to person, place, and time. Review medications for side effects contributing to fall risk. Reinforce activity limits and safety measures with patient and family.

## 2020-12-11 NOTE — ED PROVIDER NOTE - OBJECTIVE STATEMENT
79 y/o F hx MDD, Overactive Bladder, Anxiety, HLD BIB  secondary to depression an  insomnia . Admit to inability to cope. States that she has stopped taking her  medications because they are  not working.  Present with flat affect and tearful.   Denies falling, punching or kicking any objectives.    Denies pain , SOB, fever, chills ,chest/abdominal discomfort.  Denies SI/HI/AH/VH. No evidence of physical injuries, broken  skin or deformities. 81 y/o F hx MDD, Overactive Bladder, Anxiety, HLD BIB  secondary to dizziness, depression  and confusion  . Admit to inability to cope. States that she lives alone and fell several times hitting her head.  Son states that patient has not been herself over the past few weeks.   Present with flat affect and tearful.   Denies pain , SOB, fever, chills ,chest/abdominal discomfort.  Denies SI/HI/AH/VH. No evidence of physical injuries, broken  skin or deformities.

## 2020-12-11 NOTE — ED BEHAVIORAL HEALTH ASSESSMENT NOTE - HPI (INCLUDE ILLNESS QUALITY, SEVERITY, DURATION, TIMING, CONTEXT, MODIFYING FACTORS, ASSOCIATED SIGNS AND SYMPTOMS)
Patient is 80 year old   female currently residing alone. PPH MDD/Anxiety after her husbands death 3 years ago. No hx of inpatient admissions or suicide attempts. No hx of aggression, violence, legal issues or substance abuse problems. PMH-HTN. BIB family referred by NOLA/MD for psychosis.     Patient reports she came to the ED because her son made her come. She reprots last night she got upset and her son said she was "psychotic." Patient reports she stayed with her son last night and she got angry and was yelling and screaming. She reprots she was "letting out steam," because she is frustrated and worried over finances. She stated she is at risk to lose her condo and has to figure out where to live, what to do with furniture and has no money to buy food. She stated she cannot sleep (chronic issue) and so has not been driving. She  stated her sons have not been helping her solve this problem with her belongings and home and so she screamed because she was upset.    Patient reports she told them "I wish I was dead." She stated she says this sometimes out frustration and "I want their attention so they'll help me." She admits to pounding on her legs because she was angry but denies intent to harm self. . She endorses other passive suicidal thoughts stating there is no point and she doesn't do anything with her life. She denies active SI/intent/plan    She stated she collects social security and normally gets $1700. In October she started charging things and then got a letter from the bank stating they were going online vs. paper statements. She reports she closed her savings account due to fear of her money being taken and believes it was taken because she did not pay all the money on the charge card. Patient reports now she thinks she will lose her condo because she can't afford it due to her lack of money. She reports not eating due to trying to save money "so I can stay in my condo." Patient reports she also thinks her cell phone and home are tapped. She stated her downstairs neighbors are some how involved and are monitoring her. She refused to provide information regarding the extent or reason for their involvement stating "I should really just tell the authorities." She reports not showering daily because she is afraid she'll fall and not changing her clothes daily because "I'm comfortable."     Patient reports feeling sad/depressed over her finances. She endorses chronic issues with sleep and poor appetite. However she stated her poor appetite is due to being unable to buy food due to money. She endorses hopelessness about her living situation and anxiety.     Patient denies any hallucinations, does not report any delusional thought content, denies thought insertion/withdrawal & denies referential thought processes Patient does not report nor exhibit any signs of familia, including irritable or elevated mood, grandiosity, pressured speech, risk-taking behaviors, increase in productivity or agitation. . Patient adamantly denies SI, intent or plan; denies any HI, violent thoughts.     Spoke with NOLA Gabriel ([phone])- Patient was referred by MD Dr. Villa. Patient was referred because 3-4 months ago there was an abrupt change in her behavior. Patient's son said patient thought she would hurt herself. She is delusional and paranoid. Patient thinks she is being poisoned and there are yellow specks in her food and they keep her from moving her bowels. Patient is self disimpacting herself because she doesn't think she can go. She thinks she is being watched. She states the police are coming to take her away due to bad things she has done in her past but refuses to state what was done. Until a few months ago the patient was driving and living independently. She has no hx of drug or alcohol use. She has chronic insomnia issues and has had multiple prescription and OTC medication. She has developed some stuttering in the next few months. MMSE and she scored 27/30. Patient has "abrupt onset psychosis." They tried to send [facility] but they were closed. Daughter in law called this AM stating patient had several outbursts last night and seemed worse so they directed her to [facility]. Patient is 80 year old   female currently residing alone. PPH MDD/Anxiety after her husbands death 3 years ago. No hx of inpatient admissions or suicide attempts. No hx of aggression, violence, legal issues or substance abuse problems. PMH-HTN. BIB family referred by NOLA/MD for psychosis.     Patient reports she came to the ED because her son made her come. She reprots last night she got upset and her son said she was "psychotic." Patient reports she stayed with her son last night and she got angry and was yelling and screaming. She reprots she was "letting out steam," because she is frustrated and worried over finances. She stated she is at risk to lose her condo and has to figure out where to live, what to do with furniture and has no money to buy food. She stated she cannot sleep (chronic issue) and so has not been driving. She  stated her sons have not been helping her solve this problem with her belongings and home and so she screamed because she was upset.    Patient reports she told them "I wish I was dead." She stated she says this sometimes out frustration and "I want their attention so they'll help me." She admits to pounding on her legs because she was angry but denies intent to harm self. . She endorses other passive suicidal thoughts stating there is no point and she doesn't do anything with her life. She denies active SI/intent/plan    She stated she collects social security and normally gets $1700. In October she started charging things and then got a letter from the bank stating they were going online vs. paper statements. She reports she closed her savings account due to fear of her money being taken and believes it was taken because she did not pay all the money on the charge card. Patient reports now she thinks she will lose her condo because she can't afford it due to her lack of money. She reports not eating due to trying to save money "so I can stay in my condo."  Then patient recanted stating she is not eating because they poison her food and put yellow specks in it which cause constipation. She thinks this is all related to the bank. Patient reports she also thinks her cell phone and home are tapped. She stated her downstairs neighbors are some how involved and are monitoring her. She refused to provide information regarding the extent or reason for their involvement stating "I should really just tell the authorities." She reports not showering daily because she is afraid she'll fall and not changing her clothes daily because "I'm comfortable."     Patient reports feeling sad/depressed over her finances. She endorses chronic issues with sleep and poor appetite. However she stated her poor appetite is due to being unable to buy food due to money. She endorses hopelessness about her living situation and anxiety.     Patient denies any hallucinations, does not report any delusional thought content, denies thought insertion/withdrawal & denies referential thought processes Patient does not report nor exhibit any signs of familia, including irritable or elevated mood, grandiosity, pressured speech, risk-taking behaviors, increase in productivity or agitation. . Patient adamantly denies SI, intent or plan; denies any HI, violent thoughts.     Spoke with NOLA Gabriel ([phone])- Patient was referred by MD Dr. Villa. Patient was referred because 3-4 months ago there was an abrupt change in her behavior. Patient's son said patient thought she would hurt herself. She is delusional and paranoid. Patient thinks she is being poisoned and there are yellow specks in her food and they keep her from moving her bowels. Patient is self disimpacting herself because she doesn't think she can go. She thinks she is being watched. She states the police are coming to take her away due to bad things she has done in her past but refuses to state what was done. Until a few months ago the patient was driving and living independently. She has no hx of drug or alcohol use. She has chronic insomnia issues and has had multiple prescription and OTC medication. She has developed some stuttering in the next few months. MMSE and she scored 27/30. Patient has "abrupt onset psychosis." They tried to send [facility] but they were closed. Daughter in law called this AM stating patient had several outbursts last night and seemed worse so they directed her to [facility]. Patient is 80 year old   female currently residing alone. PPH MDD/Anxiety after her husbands death 3 years ago. No hx of inpatient admissions or suicide attempts. No hx of aggression, violence, legal issues or substance abuse problems. PMH-HTN, HLD, OAB, Cholelithiasis with acute cholecystitis & Diverticulosis. BIB family referred by NOLA/MD for psychosis.     Patient reports she came to the ED because her son made her come. She reports last night she got upset and her son said she was "psychotic." Patient reports she stayed with her son last night and she got angry and was yelling and screaming. She reports she was "letting out steam," because she is frustrated and worried over finances. She stated she is at risk to lose her condo and has to figure out where to live, what to do with furniture and has no money to buy food. She stated she cannot sleep (chronic issue) and so has not been driving. She stated her sons have not been helping her solve this problem with her belongings and home and so she screamed because she was upset.    Patient reports she told them "I wish I was dead." She stated she says this sometimes out frustration and "I want their attention so they'll help me." She admits to pounding on her legs because she was angry but denies intent to harm self. She endorses other passive suicidal thoughts stating there is no point and she doesn't do anything with her life. She denies active SI/intent/plan    She stated she collects social security and normally gets $1700. End of October she started charging things and then got a letter from the bank stating they were going online vs. paper statements. She reports she closed her savings account due to fear of her money being taken and believes it was taken because she did not pay all the money on the charge card. Patient reports now she thinks she will lose her condo because she can't afford it due to her lack of money. She reports not eating due to trying to save money "so I can stay in my condo."  Then patient recanted stating she is not eating because they poison her food and put yellow specks in it which cause constipation. She thinks this is all related to the bank. Patient reports she also thinks her cell phone and home are tapped. She stated her downstairs neighbors are some how involved and are monitoring her. She refused to provide information regarding the extent or reason for their involvement stating "I should really just tell the authorities." She reports not showering daily because she is afraid she'll fall and not changing her clothes daily because "I'm comfortable."     Patient reports feeling sad/depressed over her finances. She endorses chronic issues with sleep and poor appetite. She endorses hopelessness about her living situation and anxiety.     Patient denies any hallucinations & denies thought insertion/withdrawal. Patient does not report nor exhibit any signs of familia, including irritable or elevated mood, grandiosity, pressured speech, risk-taking behaviors, increase in productivity or agitation. Patient adamantly denies SI, intent or plan; denies any HI, violent thoughts.     Spoke with NOLA Gabriel ([phone])- Patient was referred by MD Dr. Villa. Patient was referred because 1-2 months ago there was an abrupt change in her behavior. Patient's son said patient thought she would hurt herself. She is delusional and paranoid. Patient thinks she is being poisoned and there are yellow specks in her food and they keep her from moving her bowels. Patient is self disimpacting herself because she doesn't think she can go. She thinks she is being watched. She states the police are coming to take her away due to bad things she has done in her past but refuses to state what was done. Until a few months ago the patient was driving and living independently. She has no hx of drug or alcohol use. She has chronic insomnia issues and has had multiple prescription and OTC medication. She has developed some stuttering in the last few months. MMSE and she scored 27/30. Patient has "abrupt onset psychosis." They tried to send [facility] but they were closed. Daughter in law called this AM stating patient had several outbursts last night and seemed worse so they directed her to [facility].

## 2020-12-11 NOTE — ED BEHAVIORAL HEALTH ASSESSMENT NOTE - GAF
Attended the  of [facility] on 2017 @ 1908 by Micki Eisenmenger CNM. APGARS 8/9. Mother Blood Type A+ . GBS Negative. SROM x 1 hour. Clear Fluid. Gestation 39+6 weeks. No nuchal cord or shoulder dystocia noted. Infant to mother's abdomen immediately following delivery. Baby dried and given tactile stimulation. Pink and vigorous with lusty cry. Cord clamped after pulsating complete and cut by FOB. Baby remains skin to skin with mother at this time. No distress noted. Magic hour in process; parents instructed to call with concerns or needs. 20

## 2020-12-11 NOTE — ED ADULT NURSE NOTE - OBJECTIVE STATEMENT
Received pt in  pt c/o depression & insomnia denies si/hi/avh presently emotional reassurance provided eval on going.

## 2020-12-11 NOTE — ED BEHAVIORAL HEALTH ASSESSMENT NOTE - SUMMARY
Patient is 80 year old   female currently residing alone. PPH MDD/Anxiety after her husbands death 3 years ago. No hx of inpatient admissions or suicide attempts. No hx of aggression, violence, legal issues or substance abuse problems. PMH-HTN. BIB family referred by NOLA/MD for psychosis. Patient is 80 year old   female currently residing alone. PPH MDD/Anxiety after her husbands death 3 years ago. No hx of inpatient admissions or suicide attempts. No hx of aggression, violence, legal issues or substance abuse problems. PMH-HTN. BIB family referred by NOLA/MD for psychosis.    Patient presents to the ED int he context of psychosis. Patient presents with paranoid delusions and reports not eating related to delusions. She has endorsed passive suicidal ideation to son (denies active SI/plan/intent) and depression related to her paranoid delusions. Patient not caring for self- losing weight, decrease in ADLs. She is not at baseline with poor insight. She presents at imminent risk to self and requires inpatient admission for safety and stabilization.      Patient's paranoia is an abrupt change from baseline- she has no hx of psychosis. She has 2/3 word recall, some word finding difficulties. She recently fell and has a high WBC. Based on her history there is concern for medical etiology causing psychosis. Further medical work up is needed at this point. Recommend admit to medicine and CL to follow. Patient is 80 year old   female currently residing alone. PPH MDD/Anxiety after her husbands death 3 years ago. No hx of inpatient admissions or suicide attempts. No hx of aggression, violence, legal issues or substance abuse problems. PMH-HTN, HLD, OAB, Cholelithiasis with acute cholecystitis & Diverticulosis. BIB family referred by NOLA/MD for psychosis.     Patient presents to the ED in the context of psychosis. Patient presents with paranoid delusions and reports not eating related to delusions. She has endorsed passive suicidal ideation to son (denies active SI/plan/intent) and depression related to her paranoid delusions. Patient not caring for self- losing weight, decrease in ADLs. She is not at baseline with poor insight. She presents at imminent risk to self and requires inpatient admission for safety and stabilization.      Patient's paranoia is an abrupt change from baseline- she has no hx of psychosis. She has 2/3 word recall, some word finding difficulties. She recently fell and has a high WBC. Based on her history there is concern for medical etiology causing psychosis. Further medical work up is needed at this point. Recommend admit to medicine and CL to follow. Patient is 80 year old   female currently residing alone. PPH MDD/Anxiety after her husbands death 3 years ago. No hx of inpatient admissions or suicide attempts. No hx of aggression, violence, legal issues or substance abuse problems. PMH-HTN, HLD, OAB, Cholelithiasis with acute cholecystitis & Diverticulosis. BIB family referred by NOLA/MD for psychosis.     Patient presents to the ED in the context of psychosis. Patient presents with paranoid delusions and reports not eating related to delusions. She has endorsed suicidal ideation to son (denies active SI/plan/intent) and depression related to her paranoid delusions. Patient denies active SI/plan/intent. Patient not caring for self- losing weight, decrease in ADLs. She is not at baseline with poor insight. She presents at imminent risk to self and requires inpatient admission for safety and stabilization.      Patient's paranoia is an abrupt change from baseline- she has no hx of psychosis. She has 2/3 word recall, some word finding difficulties. She recently fell and has a high WBC. Based on her history there is concern for medical etiology causing psychosis. Further medical work up is needed at this point. Recommend admit to medicine and CL to follow.

## 2020-12-11 NOTE — ED BEHAVIORAL HEALTH ASSESSMENT NOTE - VIOLENCE RISK FACTORS:
Noncompliance with treatment/Feeling of being under threat and being unable to control threat/Lack of insight into violence risk/need for treatment

## 2020-12-11 NOTE — ED BEHAVIORAL HEALTH ASSESSMENT NOTE - RISK ASSESSMENT
Risk factors- passive suicidal ideation, poor insight, non compliant with medication, depression, psychosis, elderly, lives alone, not caring for self & recent SIB    protective factors- no HI, no hx of suicide attempts, no familia/hypomania, no aggression/violence, no legal issues, no susbtance abuse Moderate Acute Suicide Risk Risk factors- passive suicidal ideation, poor insight, non compliant with medication, depression, psychosis, elderly, lives alone, not caring for self & recent SIB    protective factors- no HI, no hx of suicide attempts, no familia/hypomania, no aggression/violence, no legal issues, no substance abuse Low Acute Suicide Risk

## 2020-12-11 NOTE — ED BEHAVIORAL HEALTH ASSESSMENT NOTE - ADDITIONAL DETAILS / COMMENTS
2/3 word recall, poor calculations, some word finding difficulties 2/3 word recall, unable to perform correct calculations, some word finding difficulties

## 2020-12-11 NOTE — ED BEHAVIORAL HEALTH ASSESSMENT NOTE - DESCRIPTION
During course of ED visit patient was calm and cooperative. Patient was not aggressive or violent and did not require PRN medications.    Vital Signs Last 24 Hrs  T(C): 36.7 (11 Dec 2020 12:28), Max: 36.7 (11 Dec 2020 12:28)  T(F): 98 (11 Dec 2020 12:28), Max: 98 (11 Dec 2020 12:28)  HR: 77 (11 Dec 2020 17:04) (77 - 100)  BP: 156/77 (11 Dec 2020 17:04) (141/76 - 156/77)  BP(mean): --  RR: 16 (11 Dec 2020 17:04) (16 - 17)  SpO2: 100% (11 Dec 2020 17:04) (100% - 100%) htn see hpi

## 2020-12-11 NOTE — ED ADULT TRIAGE NOTE - CHIEF COMPLAINT QUOTE
Pt c/o increased depression, thoughts of not wanting to live, lack of appetite and decrease want to get out of bed. Pt not currently taking any psych medications. Pt teary in triage. Pt denies PMH/ medical complaints

## 2020-12-11 NOTE — ED BEHAVIORAL HEALTH ASSESSMENT NOTE - PAST PSYCHOTROPIC MEDICATION
unknown- anxiety medication? Lorazepam, abilify, remeron, valium Lorazepam, abilify, xanax, valium, remeron

## 2020-12-12 DIAGNOSIS — N30.00 ACUTE CYSTITIS WITHOUT HEMATURIA: ICD-10-CM

## 2020-12-12 DIAGNOSIS — Z02.9 ENCOUNTER FOR ADMINISTRATIVE EXAMINATIONS, UNSPECIFIED: ICD-10-CM

## 2020-12-12 DIAGNOSIS — K59.00 CONSTIPATION, UNSPECIFIED: ICD-10-CM
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
## 2020-12-12 DIAGNOSIS — F23 BRIEF PSYCHOTIC DISORDER: ICD-10-CM

## 2020-12-12 DIAGNOSIS — Z29.9 ENCOUNTER FOR PROPHYLACTIC MEASURES, UNSPECIFIED: ICD-10-CM

## 2020-12-12 DIAGNOSIS — R41.0 DISORIENTATION, UNSPECIFIED: ICD-10-CM

## 2020-12-12 DIAGNOSIS — Z90.710 ACQUIRED ABSENCE OF BOTH CERVIX AND UTERUS: Chronic | ICD-10-CM

## 2020-12-12 DIAGNOSIS — E86.0 DEHYDRATION: ICD-10-CM

## 2020-12-12 DIAGNOSIS — I10 ESSENTIAL (PRIMARY) HYPERTENSION: ICD-10-CM

## 2020-12-12 PROCEDURE — 99223 1ST HOSP IP/OBS HIGH 75: CPT | Mod: AI

## 2020-12-12 PROCEDURE — 99233 SBSQ HOSP IP/OBS HIGH 50: CPT

## 2020-12-12 RX ORDER — AMLODIPINE BESYLATE 2.5 MG/1
2.5 TABLET ORAL
Qty: 0 | Refills: 0 | DISCHARGE

## 2020-12-12 RX ORDER — PANTOPRAZOLE SODIUM 20 MG/1
40 TABLET, DELAYED RELEASE ORAL
Refills: 0 | Status: DISCONTINUED | OUTPATIENT
Start: 2020-12-12 | End: 2020-12-16

## 2020-12-12 RX ORDER — RISPERIDONE 4 MG/1
0.25 TABLET ORAL
Refills: 0 | Status: DISCONTINUED | OUTPATIENT
Start: 2020-12-12 | End: 2020-12-15

## 2020-12-12 RX ORDER — AMLODIPINE BESYLATE 2.5 MG/1
2.5 TABLET ORAL DAILY
Refills: 0 | Status: DISCONTINUED | OUTPATIENT
Start: 2020-12-12 | End: 2020-12-12

## 2020-12-12 RX ORDER — OMEPRAZOLE 10 MG/1
1 CAPSULE, DELAYED RELEASE ORAL
Qty: 0 | Refills: 0 | DISCHARGE

## 2020-12-12 RX ORDER — MINERAL OIL
30 OIL (ML) MISCELLANEOUS ONCE
Refills: 0 | Status: COMPLETED | OUTPATIENT
Start: 2020-12-12 | End: 2020-12-12

## 2020-12-12 RX ORDER — OLANZAPINE 15 MG/1
1.25 TABLET, FILM COATED ORAL DAILY
Refills: 0 | Status: DISCONTINUED | OUTPATIENT
Start: 2020-12-12 | End: 2020-12-16

## 2020-12-12 RX ORDER — SODIUM CHLORIDE 9 MG/ML
1000 INJECTION INTRAMUSCULAR; INTRAVENOUS; SUBCUTANEOUS
Refills: 0 | Status: DISCONTINUED | OUTPATIENT
Start: 2020-12-12 | End: 2020-12-14

## 2020-12-12 RX ORDER — INFLUENZA VIRUS VACCINE 15; 15; 15; 15 UG/.5ML; UG/.5ML; UG/.5ML; UG/.5ML
0.5 SUSPENSION INTRAMUSCULAR ONCE
Refills: 0 | Status: DISCONTINUED | OUTPATIENT
Start: 2020-12-12 | End: 2020-12-12

## 2020-12-12 RX ORDER — INFLUENZA VIRUS VACCINE 15; 15; 15; 15 UG/.5ML; UG/.5ML; UG/.5ML; UG/.5ML
0.7 SUSPENSION INTRAMUSCULAR ONCE
Refills: 0 | Status: DISCONTINUED | OUTPATIENT
Start: 2020-12-12 | End: 2020-12-16

## 2020-12-12 RX ORDER — AMLODIPINE BESYLATE 2.5 MG/1
2.5 TABLET ORAL DAILY
Refills: 0 | Status: DISCONTINUED | OUTPATIENT
Start: 2020-12-12 | End: 2020-12-16

## 2020-12-12 RX ORDER — LANOLIN ALCOHOL/MO/W.PET/CERES
6 CREAM (GRAM) TOPICAL AT BEDTIME
Refills: 0 | Status: DISCONTINUED | OUTPATIENT
Start: 2020-12-12 | End: 2020-12-16

## 2020-12-12 RX ORDER — SENNA PLUS 8.6 MG/1
2 TABLET ORAL AT BEDTIME
Refills: 0 | Status: DISCONTINUED | OUTPATIENT
Start: 2020-12-12 | End: 2020-12-16

## 2020-12-12 RX ORDER — HEPARIN SODIUM 5000 [USP'U]/ML
5000 INJECTION INTRAVENOUS; SUBCUTANEOUS EVERY 12 HOURS
Refills: 0 | Status: DISCONTINUED | OUTPATIENT
Start: 2020-12-12 | End: 2020-12-16

## 2020-12-12 RX ORDER — CEFDINIR 250 MG/5ML
300 POWDER, FOR SUSPENSION ORAL
Qty: 0 | Refills: 0 | DISCHARGE

## 2020-12-12 RX ORDER — CEFTRIAXONE 500 MG/1
1000 INJECTION, POWDER, FOR SOLUTION INTRAMUSCULAR; INTRAVENOUS EVERY 24 HOURS
Refills: 0 | Status: DISCONTINUED | OUTPATIENT
Start: 2020-12-13 | End: 2020-12-12

## 2020-12-12 RX ADMIN — RISPERIDONE 0.25 MILLIGRAM(S): 4 TABLET ORAL at 11:59

## 2020-12-12 RX ADMIN — SODIUM CHLORIDE 100 MILLILITER(S): 9 INJECTION INTRAMUSCULAR; INTRAVENOUS; SUBCUTANEOUS at 07:29

## 2020-12-12 RX ADMIN — HEPARIN SODIUM 5000 UNIT(S): 5000 INJECTION INTRAVENOUS; SUBCUTANEOUS at 06:10

## 2020-12-12 RX ADMIN — PANTOPRAZOLE SODIUM 40 MILLIGRAM(S): 20 TABLET, DELAYED RELEASE ORAL at 06:10

## 2020-12-12 RX ADMIN — Medication 30 MILLILITER(S): at 16:58

## 2020-12-12 RX ADMIN — RISPERIDONE 0.25 MILLIGRAM(S): 4 TABLET ORAL at 23:25

## 2020-12-12 RX ADMIN — HEPARIN SODIUM 5000 UNIT(S): 5000 INJECTION INTRAVENOUS; SUBCUTANEOUS at 17:33

## 2020-12-12 RX ADMIN — Medication 6 MILLIGRAM(S): at 23:26

## 2020-12-12 RX ADMIN — SENNA PLUS 2 TABLET(S): 8.6 TABLET ORAL at 23:26

## 2020-12-12 RX ADMIN — CEFTRIAXONE 100 MILLIGRAM(S): 500 INJECTION, POWDER, FOR SOLUTION INTRAMUSCULAR; INTRAVENOUS at 01:31

## 2020-12-12 RX ADMIN — AMLODIPINE BESYLATE 2.5 MILLIGRAM(S): 2.5 TABLET ORAL at 06:10

## 2020-12-12 NOTE — PHYSICAL THERAPY INITIAL EVALUATION ADULT - PERTINENT HX OF CURRENT PROBLEM, REHAB EVAL
80 year old female MHx HTN, Anxiety/Depression, diverticulosis sent to ED by son after an episode of agitation. She admits she has had anxiety/depression for "many years" but it has worsened over the past 4 months, recently resulting in multiple outbursts of agitation, verbally abusing her family and hitting herself. CT head negative. CXR clear.  Left hand and wrist Xray negative for fractures. Found to have pyuria on UA but no bacteriuria. 80 year old female Hx HTN, Anxiety/Depression, diverticulosis sent to ED by son after an episode of agitation. She admits she has had anxiety/depression for "many years" but it has worsened over the past 4 months, recently resulting in multiple outbursts of agitation, verbally abusing her family and hitting herself. Pt also s/p multiple falls at home. CT head negative. CXR clear. Left hand and wrist Xray negative. Found to have pyuria on UA but no bacteriuria.

## 2020-12-12 NOTE — H&P ADULT - ASSESSMENT
81 y/o F PMHx HTn, Anxiety/Depression, diverticulosis sent to ED by son after an episode of agitation, admitted for delirium 2/2 uti, r/o psychosis. 81 y/o F PMHx HTn, Anxiety/Depression, diverticulosis sent to ED by son after an episode of agitation, admitted for delirium 2/2 uti, r/o psychosis.      79yo Female MHx HTN, Anxiety/Depression, diverticulosis a/w brief episode of psychosis; Found to have UTI;

## 2020-12-12 NOTE — H&P ADULT - PROBLEM SELECTOR PLAN 1
- Likely 2/2 uti  - r/o psychosis  - Psych c/s - Likely 2/2 uti  - r/o psychosis  - Zyprexa 1.25 prn agitation, per psych  - Psych c/s - currently in usual state of health   - Zyprexa 1.25 prn agitation, per psych  - Psych c/s  - f/u med rec Exhibits fair insight; Acknowledges that the episode was irrational and provoked by frustration;   - currently in usual state of health   - Zyprexa 1.25 prn agitation, per psych  - Psych c/s  - f/u med rec  - SW c/s Exhibits fair insight; Acknowledges that the episode was irrational and provoked by frustration; Low suspicion of delirium 2/2 infectious etiology given rapid resolution of symptoms prior Abx administration;   - currently in usual state of health   - TSH wnl  - Urine and serum toxicology negative   - Zyprexa 1.25 prn agitation, per psych  - Psych c/s  - f/u med rec  - SW c/s

## 2020-12-12 NOTE — BH CONSULTATION LIAISON PROGRESS NOTE - NSBHASSESSMENTFT_PSY_ALL_CORE
80 year old   female currently residing alone. PPH MDD/Anxiety after her husbands death 3 years ago. No hx of inpatient admissions or suicide attempts. No hx of aggression, violence, legal issues or substance abuse problems. PMH-HTN, HLD, OAB, Cholelithiasis with acute cholecystitis & Diverticulosis. BIB family referred by [facility]/MD for psychosis.     Patient presented to the ED in the context of psychosis. Patient presents with paranoid delusions and reports not eating related to delusions. She has endorsed suicidal ideation to son (denies active SI/plan/intent) as well as to CL team, and depression related to her paranoid delusions. Patient denies active SI/plan/intent. Patient not caring for self- losing weight, decrease in ADLs. She is not at baseline with poor insight. She presents at imminent risk to self and continues to require inpatient admission for safety and stabilization.     Patient's paranoia is an abrupt change from baseline- she has no hx of psychosis. She recently fell and has a high WBC, so was medically admitted and will continue to be followed by  psychiatry. Based on her history there is concern for medical etiology causing psychosis.    PLAN:  1. START Risperdal 0.25mg PO BID (can give one dose now for this morning) for psychosis/delusional disorder  2. Continue Zyprexa 1.25mg PO/IM PRN for agitation  3. Patient is able to engage in discussion of risks and benefits of treatment with writer, and has capacity to refuse medical interventions. However, she lacks understanding of the reasoning for her medical admission and does not have capacity to leave AMA. 80 year old   female currently residing alone. PPH MDD/Anxiety after her husbands death 3 years ago. No hx of inpatient admissions or suicide attempts. No hx of aggression, violence, legal issues or substance abuse problems. PMH-HTN, HLD, OAB, Cholelithiasis with acute cholecystitis & Diverticulosis. BIB family referred by [facility]/MD for psychosis. Patient presented to the ED in the context of psychosis. Patient presents with paranoid delusions  (feels that her neighbors are mixing something in her food to make her uncomfortable constipated. She  reports her reasons for not eating as the blockage in her lower abdomen. She has endorsed suicidal ideation to son (denies active SI/plan/intent) as well as to CL team, and depression related to her paranoid delusions. Patient denies active SI/plan/intent. Patient not caring for self- losing weight, decrease in ADLs. She is not at baseline with poor insight. She presents at imminent risk to self and continues to require inpatient admission for safety and stabilization.     Patient's paranoia  seems to be going on for a year. She has h/o fall because of generalized weakness caused by  poor oral intake , this time she is presenting with high WBC. She is  medically admitted  for cystitis and will  be continued  to be followed by  psychiatry.     PLAN:  1. START Risperdal 0.25mg PO BID (can give one dose now for this morning) for psychosis/delusional disorder  2. Continue Zyprexa 1.25mg PO/IM PRN for agitation  3. Patient is  unable to clearly engage in discussion regarding the cause of her problems, need for investigation and treatment , risks and benefits of treatment with writer, so she does not seem to have  capacity to refuse medical interventions or has  understanding of the reasoning for her medical admission and does not have capacity to leave AMA.

## 2020-12-12 NOTE — PHYSICAL THERAPY INITIAL EVALUATION ADULT - PLANNED THERAPY INTERVENTIONS, PT EVAL
bed mobility training/balance training/strengthening/gait training/Patient left supine in bed in NAD, call bell in reach, all lines intact. +Bed alarm./transfer training

## 2020-12-12 NOTE — CHART NOTE - NSCHARTNOTEFT_GEN_A_CORE
Search Terms: ellyn hackett, 1940     Search Date: 12/12/2020 08:16:36 AM       Searching on behalf of: [id number] - [facility]     The Drug Utilization Report below displays all of the controlled substance prescriptions, if any, that your patient has filled in the last twelve months. The information displayed on this report is compiled from pharmacy submissions to the Department, and accurately reflects the information as submitted by the pharmacies.    This report was requested by: Josh Rascon | Reference #: [id number]              Others' Prescriptions        Patient Name: Ellyn Bautista     YOB: 1940       Address: [address] [place] [address]     Sex: Female                     Rx Written    Rx Dispensed    Drug    Quantity    Days Supply    Prescriber Name    Payment Method    Dispenser      08/20/2020 08/23/2020 lorazepam 1 mg tablet  90 30 JayeshAkhil alejandra MD Medicare [id number]   07/15/2020 07/15/2020 lorazepam 0.5 mg tablet  90 30 JayeshAkhil alejandra MD Medicare [id number]   06/04/2020 06/06/2020 lorazepam 0.5 mg tablet  90 30 JayeshAkhil alejandra MD Medicare [facility][id number]   04/16/2020 04/18/2020 lorazepam 0.5 mg tablet  90 30 JayeshAkhil alejandra MD Medicare [facility][id number]   03/31/2020 03/31/2020 oxycodone-acetaminophen 5-325 mg tablet  12 2 TonijunieSantiago Medicare [facility][id number]   01/28/2020 01/29/2020 lorazepam 0.5 mg tablet  90 30 Akhil Mcconnell MD Medicare [id number]   01/08/2020 01/11/2020 temazepam 15 mg capsule  30 30 Akhil Mcconnell MD Medicare [id number]   01/02/2020 01/02/2020 temazepam 30 mg capsule  30 30 Akhil Mcconnell MD Medicare [id number]   12/19/2019 12/21/2019 lorazepam 0.5 mg tablet  90 30 Akhil Mcconnell MD Medicare [id number]               * - Drugs marked with an asterisk are compound drugs. If the compound drug is made up of more than one controlled substance, then each controlled substance will be a separate row in the table.

## 2020-12-12 NOTE — H&P ADULT - PROBLEM SELECTOR PLAN 2
- CTX  - f/u UCx - CTX  - trend WBC, monitor for fever.   - f/u UCx Symptomatic; PE (+) suprapubic tenderness;    - UA (+) mod LE  - CTX IV  - trend WBC, monitor for fever.   - f/u UCx

## 2020-12-12 NOTE — PROGRESS NOTE ADULT - ASSESSMENT
81yo Female MHx HTN, Anxiety/Depression, diverticulosis a/w brief episode of psychosis; Found to have UTI;      81yo Female MHx HTN, Anxiety/Depression, diverticulosis a/w brief episode of psychosis; Found to have UTI      79yo Female MHx HTN, Anxiety/Depression, diverticulosis a/w brief episode of psychosis; Found to have pyuria on UA but no bacteriuria.

## 2020-12-12 NOTE — H&P ADULT - NSICDXPASTMEDICALHX_GEN_ALL_CORE_FT
PAST MEDICAL HISTORY:  Anxiety     Cholelithiasis with acute cholecystitis     Diverticulosis     Hiatal hernia     Hypertriglyceridemia     Insomnia     OAB (overactive bladder)

## 2020-12-12 NOTE — H&P ADULT - HISTORY OF PRESENT ILLNESS
79 y/o F PMHx HTn, Anxiety/Depression, diverticulosis sent to ED by son after an episode of agitation. She admits she has had anxiety/depression for "many years" but it has worsened over the past 4 months resulting in multiple outbursts of agitation that she doesn't "know where it comes from," verbally abusing her family and hitting herself. She states that, despite this, she has a good relationship with her 2 sons. She endorses insomnia, decreased appetite, paranoia, anxiety, lack of energy, and passive SI, stating she thinks about harming herself but never would because she loves her sons." She adamantly denies suicidal intent or plan. Of note, she would not elaborate with writer regarding paranoia but endorsed to psychiatry, delusions of people poisoning her food to make her constipated and of police plotting to come take her away to do bad things. She admits she has tried "a few" anxiety and depression medications over the past few years but does not remember the names.     She has had several falls over the pass few months, last episode yesterday, preceded by dizziness and nausea. She fell on her knees and hit her head on the wall but states no LOC, vision changes, HA, numbness/tingling, loss of bowel or bladder. She states mild back pain since the even, worsened with movement and relieved with rest.     Pt also notes generalized abdominal pain over the past 2 months. She doesn't recognize any palliating/provoking factors. She states the pain feels like a uti and has had polyuria and mild nausea for the past 2 days. Of note, she states that she normally has bowel movements every 1-2 days but has not has one in the past 6 days. Denies fever, chills, vomiting, dysphagia, diarrhea, HA, CP, SOB, palpitations, dysuria, LE edema, sick contacts, recent travel. 81 y/o F PMHx HTn, Anxiety/Depression, diverticulosis sent to ED by son after an episode of agitation. She admits she has had anxiety/depression for "many years" but it has worsened over the past 4 months resulting in multiple outbursts of agitation that she doesn't "know where it comes from," verbally abusing her family and hitting herself. She states that, despite this, she has a good relationship with her 2 sons. She endorses insomnia, decreased appetite, paranoia, anxiety, lack of energy, and passive SI, stating she thinks about harming herself but never would because she loves her sons." She adamantly denies suicidal intent or plan. Of note, she would not elaborate with writer regarding paranoia but endorsed to psychiatry, delusions of people poisoning her food to make her constipated and of police plotting to come take her away to do bad things. She admits she has tried "a few" anxiety and depression medications over the past few years but does not remember the names.     She has had several falls over the pass few months, last episode yesterday, preceded by dizziness. She fell on her knees and hit her head on the wall but states no LOC, vision changes, HA, numbness/tingling, loss of bowel or bladder. She states mild back pain since the even, worsened with movement and relieved with rest.     Pt also notes generalized abdominal pain over the past 2 months. She doesn't recognize any palliating/provoking factors. She states the pain feels like a uti and has had polyuria and mild nausea for the past 2 days. Of note, she states that she normally has bowel movements every 1-2 days and has not has one in the past 6 days, but has been passing flatus. Denies fever, chills, vomiting, dysphagia, diarrhea, HA, CP, SOB, palpitations, dysuria, LE edema, sick contacts, recent travel. 81 y/o F PMHx HTn, Anxiety/Depression, diverticulosis sent to ED by son after an episode of agitation. She admits she has had anxiety/depression for "many years" but it has worsened over the past 4 months resulting in multiple outbursts of agitation that she doesn't "know where it comes from," verbally abusing her family and hitting herself. She states that, despite this, she has a good relationship with her 2 sons. She endorses insomnia, decreased appetite, paranoia, anxiety, lack of energy, and passive SI, stating she thinks about harming herself but never would because she loves her sons." She adamantly denies suicidal intent or plan. Of note, she would not elaborate with writer regarding paranoia but endorsed to psychiatry, delusions of people poisoning her food to make her constipated and of police plotting to come take her away to do bad things. She admits she has tried "a few" anxiety and depression medications over the past few years but does not remember the names.     She has had several falls over the pass few months, last episode yesterday, preceded by dizziness. She fell on her knees and hit her head on the wall but states no LOC, vision changes, HA, numbness/tingling, loss of bowel or bladder. She states mild back pain since the even, worsened with movement and relieved with rest.     Pt also notes generalized abdominal pain over the past 2 months. She doesn't recognize any palliating/provoking factors. She states the pain feels like a uti and has had polyuria and mild nausea for the past 2 days. Of note, she states that she normally has bowel movements every 1-2 days and has not has one in the past 6 days, but has been passing flatus. Denies fever, chills, vomiting, dysphagia, melena, BRBPR, diarrhea, HA, CP, SOB, palpitations, dysuria, LE edema, sick contacts, recent travel. 79 y/o F PMHx HTn, Anxiety/Depression, diverticulosis sent to ED by son after an episode of agitation. She admits she has had anxiety/depression for "many years" but it has worsened over the past 4 months, recently resulting in multiple outbursts of agitation, verbally abusing her family and hitting herself. She states that, despite this, she has a good relationship with her 2 sons. She endorses insomnia, decreased appetite, paranoia, anxiety, lack of energy, and passive SI, stating she thinks about harming herself but never would because she loves her sons." She adamantly denies suicidal intent or plan. Of note, she would not elaborate with writer regarding paranoia but endorsed to psychiatry, delusions of people poisoning her food to make her constipated and of police plotting to come take her away to do bad things. She admits she has tried "a few" anxiety and depression medications over the past few years but does not remember the names.     She has had several falls over the pass few months, last episode yesterday, preceded by dizziness. She fell on her knees and hit her head on the wall but states no LOC, vision changes, HA, numbness/tingling, loss of bowel or bladder. She states mild back pain since the even, worsened with movement and relieved with rest.     Pt also notes generalized abdominal pain over the past 2 months. She doesn't recognize any palliating/provoking factors. She states the pain feels like a uti and has had polyuria and mild nausea for the past 2 days. Of note, she states that she normally has bowel movements every 1-2 days and has not has one in the past 6 days, but has been passing flatus. Denies fever, chills, vomiting, dysphagia, melena, BRBPR, diarrhea, HA, CP, SOB, palpitations, dysuria, LE edema, sick contacts, recent travel. 81 y/o F PMHx HTn, Anxiety/Depression, diverticulosis sent to ED by son after an episode of agitation. She admits she has had anxiety/depression for "many years" but it has worsened over the past 4 months, recently resulting in multiple outbursts of agitation, verbally abusing her family and hitting herself. She states that, despite this, she has a good relationship with her 2 sons. She endorses insomnia, decreased appetite, paranoia, anxiety, lack of energy, and passive SI, stating she thinks about harming herself but never would because she loves her sons." She adamantly denies suicidal intent or plan. Of note, she would not elaborate with writer regarding paranoia but endorsed to psychiatry, delusions of people poisoning her food to make her constipated and of police plotting to come take her away to do bad things. She admits she has tried "a few" anxiety and depression medications over the past few years but does not remember the names.     She has had several falls over the pass few months, last episode yesterday, preceded by dizziness. She fell on her knees and hit her head on the wall but states no LOC, vision changes, HA, numbness/tingling, loss of bowel or bladder.    Pt also notes generalized abdominal pain over the past 2 months. She doesn't recognize any palliating/provoking factors. She states the pain feels like a uti, has had "a few over the past few months," and has had polyuria and mild nausea for the past 2 days. Of note, she states that she normally has bowel movements every 1-2 days and has not has one in the past 6 days, but has been passing flatus. Denies fever, chills, vomiting, dysphagia, melena, BRBPR, diarrhea, HA, CP, SOB, palpitations, dysuria, LE edema, sick contacts, recent travel. 79 y/o Female MHx HTN, Anxiety/Depression, diverticulosis sent to ED by son after an episode of agitation. She admits she has had anxiety/depression for "many years" but it has worsened over the past 4 months, recently resulting in multiple outbursts of agitation, verbally abusing her family and hitting herself. She states that, despite this, she has a good relationship with her 2 sons. She endorses insomnia, decreased appetite, paranoia, anxiety, lack of energy, and passive SI, stating she thinks about harming herself but never would because she loves her sons." She adamantly denies suicidal intent or plan. Of note, she would not elaborate with writer regarding paranoia but endorsed to psychiatry, delusions of people poisoning her food to make her constipated and of police plotting to come take her away to do bad things. She admits she has tried "a few" anxiety and depression medications over the past few years but does not remember the names.   She has had several falls over the pass few months, last episode yesterday, preceded by dizziness. She fell on her knees and hit her head on the wall but states no LOC, vision changes, HA, numbness/tingling, loss of bowel or bladder. Pt also notes generalized abdominal pain over the past 2 months. She doesn't recognize any palliating/provoking factors. She states the pain feels like a uti, has had "a few over the past few months," and has had polyuria and mild nausea for the past 2 days. Of note, she states that she normally has bowel movements every 1-2 days and has not has one in the past 6 days, but has been passing flatus. Denies fever, chills, vomiting, dysphagia, melena, BRBPR, diarrhea, HA, CP, SOB, palpitations, dysuria, LE edema, sick contacts, recent travel.

## 2020-12-12 NOTE — H&P ADULT - NSHPSOCIALHISTORY_GEN_ALL_CORE
History of cigarette smoking, quit 30 years ago, no alcohol, illicit drug use.   Lives alone in home, afraid may not be able to pay bills. History of cigarette smoking, 1/10 PPD x 20 years, 20 years ago  Reports no alcohol, illicit drug use  Lives alone in home, afraid may not be able to pay bills  , 3 years ago  Retired  Worked in retail [employer]

## 2020-12-12 NOTE — PROGRESS NOTE ADULT - PROBLEM SELECTOR PLAN 2
Symptomatic; PE (+) suprapubic tenderness;    - UA (+) mod LE  - CTX IV  - trend WBC, monitor for fever.   - f/u UCx Symptomatic; PE (+) suprapubic tenderness;    - s/p 10 day course of macrobid outpatient   - UA (+) mod LE  - s/p 1 dose of abx, will monitor off abx  - trend WBC, monitor for fever   - f/u UCx Symptomatic; PE (+) suprapubic tenderness;    - s/p 10 day course of macrobid outpatient   - UA (+) mod LE, more consistent with pyuria than bacteriuria   - s/p 1 dose of abx, will monitor off abx  - trend WBC, monitor for fever   - f/u UCx

## 2020-12-12 NOTE — H&P ADULT - PROBLEM SELECTOR PROBLEM 1
Refill request faxed over from pharmacy, medication and pharmacy loaded for approval.     Delirium Brief psychotic disorder

## 2020-12-12 NOTE — PROGRESS NOTE ADULT - PROBLEM SELECTOR PLAN 1
Exhibits fair insight; Acknowledges that the episode was irrational and provoked by frustration; Low suspicion of delirium 2/2 infectious etiology given rapid resolution of symptoms prior Abx administration;   - currently in usual state of health   - TSH wnl  - Urine and serum toxicology negative   - Zyprexa 1.25 prn agitation, per psych  - Psych c/s  - f/u med rec  - SW c/s Exhibits fair insight; Acknowledges that the episode was irrational and provoked by frustration; Low suspicion of delirium 2/2 infectious etiology given rapid resolution of symptoms prior Abx administration;   - TSH wnl  - Urine and serum toxicology negative   - Zyprexa 1.25 prn agitation, per psych  - Psych c/s: pt does not have capacity to leave AMA but does have capacity to refuse care  - SW c/s

## 2020-12-12 NOTE — H&P ADULT - NSHPLABSRESULTS_GEN_ALL_CORE
Labs:                        12.7   18.66 )-----------( 520      ( 11 Dec 2020 14:30 )             39.9     -    132<L>  |  93<L>  |  15  ----------------------------<  102<H>  3.2<L>   |  25  |  0.66    Ca    9.6      11 Dec 2020 14:30    TPro  7.4  /  Alb  4.1  /  TBili  0.3  /  DBili  x   /  AST  18  /  ALT  22  /  AlkPhos  67  12-11      CARDIAC ENZYMES:          Serum Pro-Brain Natriuretic Peptide :     D-Dimer Assay:      Blood Gas Venous:      Blood Gas Arterial:      Hemoglobin A1C:      Urinanalysis Basic (20 @ 04:54):  Color: Light Yellow / Appearance: Clear / S.009 / pH: x  Gluc: x / Ketone: Negative / Bili: Negative / Urobili: <2 mg/dL  Blood: x / Protein: Trace / Nitrite: Negative  Leuk Esterase: Moderate / RBC: 1 / WBC: 22  Sq Epi: x / Non Sq Epi: x / Bacteria: Negative    CAPILLARY BLOOD GLUCOSE:  POCT Blood Glucose: 99 mg/dL (20 @ 14:23)      COVID-19 PCR:  NotDetec (20 @ 15:33) EKG, NSR 78bpm, qtc 465, Tw inv in V2-V3, no acute ST changes - my reading    CXR: clear lungs, no pleural effusions - my reading     CT BRAIN  PROCEDURE DATE: Dec 11 2020  FINDINGS:  There is no CT evidence of acute transcortical infarct.  Mild cerebral volume loss and moderate patchy white matter hypoattenuation which is nonspecific in etiology but likely related to microvascular disease.  There is no hydrocephalus, mass effect, or acute intracranial hemorrhage. No extra-axial collection. Basal cisterns are patent.  The visualized paranasal sinuses and mastoid air cells are clear.  The calvarium is intact.  IMPRESSION:  No evidence of acute transcortical infarct, acute intracranial hemorrhage, or mass effect.      CT ABDOMEN AND PELVIS  PROCEDURE DATE: Dec 1 2020  LOWER CHEST: Unchanged trace pericardial effusion/thickening. Bibasilar subsegmental atelectasis.  Limited evaluation of intra-abdominal and pelvic organs due to lack of IV contrast.  LIVER: Within normal limits.  BILE DUCTS: Normal caliber.  GALLBLADDER: Cholecystectomy.  SPLEEN: Within normal limits.  PANCREAS: Within normal limits.  ADRENALS: Within normal limits.  KIDNEYS/URETERS: Within normal limits.  BLADDER: Within normal limits.  REPRODUCTIVE ORGANS: Hysterectomy.  BOWEL: No bowel obstruction. Appendix normal. Small hiatal hernia PERITONEUM: No ascites.  VESSELS: Atherosclerotic changes.  RETROPERITONEUM/LYMPH NODES: No lymphadenopathy.  ABDOMINAL WALL: Within normal limits.  BONES: Degenerative changes.  IMPRESSION:  No acute pathology.    Labs:                        12.7   18.66 )-----------( 520      ( 11 Dec 2020 14:30 )             39.9     12    132<L>  |  93<L>  |  15  ----------------------------<  102<H>  3.2<L>   |  25  |  0.66    Ca    9.6      11 Dec 2020 14:30    TPro  7.4  /  Alb  4.1  /  TBili  0.3  /  DBili  x   /  AST  18  /  ALT  22  /  AlkPhos  67  12-      Urinanalysis Basic (20 @ 04:54):  Color: Light Yellow / Appearance: Clear / S.009 / pH: x  Gluc: x / Ketone: Negative / Bili: Negative / Urobili: <2 mg/dL  Blood: x / Protein: Trace / Nitrite: Negative  Leuk Esterase: Moderate / RBC: 1 / WBC: 22  Sq Epi: x / Non Sq Epi: x / Bacteria: Negative    CAPILLARY BLOOD GLUCOSE:  POCT Blood Glucose: 99 mg/dL (20 @ 14:23)      COVID-19 PCR:  NotDetec (20 @ 15:33)

## 2020-12-12 NOTE — H&P ADULT - NSHPREVIEWOFSYSTEMS_GEN_ALL_CORE
Constitutional Symptoms: No weakness, fevers, chills, weight loss  Eyes: No visual changes, headache, eye pain, double vision  Ears, Nose, Mouth, Throat: No runny nose, sinus pain, ear pain, tinnitus, sore throat, dysphagia, odynophagia  Cardiovascular: No chest pain, palpitations, edema  Respiratory: No cough, wheezing, hemoptysis, shortness of breath  Gastrointestinal: No  dysphagia, vomiting, diarrhea, hematemesis, BRBPR, melena  Genitourinary: No dysuria, hematuria  Musculoskeletal: No joint pain, joint swelling, decreased ROM  Skin: No pruritus, rashes, lesions, wounds  Neurologic:  No seizures, headache, paraesthesia, numbness, limb weakness  Psychiatric: No difficulty concentrating  Endocrine: No heat/cold intolerance, mood swings, sweats, polydipsia  Hematologic/lymphatic: No purpura, petechia, prolonged or excessive bleeding  Allergic/Immunologic: No anaphylaxis, allergic response to materials, foods, animals    Positives and pertinent negatives noted and all other systems negative. Constitutional Symptoms: No weakness, fevers, chills, weight loss  Eyes: No visual changes, headache, eye pain, double vision  Ears, Nose, Mouth, Throat: No runny nose, sinus pain, ear pain, tinnitus, sore throat, dysphagia, odynophagia  Cardiovascular: No chest pain, palpitations, edema  Respiratory: No cough, wheezing, hemoptysis, shortness of breath  Gastrointestinal: No  dysphagia, vomiting, diarrhea, hematemesis, BRBPR, melena  Genitourinary:  (+) dysuria, no hematuria  Musculoskeletal: No joint pain, joint swelling, decreased ROM, episodes of b/l LE weakness   Skin: No pruritus, rashes, lesions, wounds  Neurologic:  No seizures, headache, paraesthesia, numbness, limb weakness  Psychiatric: No difficulty concentrating, no HI or SI  Endocrine: No heat/cold intolerance, mood swings, sweats, polydipsia  Hematologic/lymphatic: No purpura, petechia, prolonged or excessive bleeding  Allergic/Immunologic: No anaphylaxis, allergic response to materials, foods, animals    Positives and pertinent negatives noted and all other systems negative.

## 2020-12-12 NOTE — PROGRESS NOTE ADULT - SUBJECTIVE AND OBJECTIVE BOX
**** INCOMPLETE******  PROGRESS NOTE:     CONTACT INFO:  Marlene Romerojak   PGY-1 Internal Medicine  [id number]    Patient is a 80y old  Female who presents with a chief complaint of "I was screaming" (12 Dec 2020 04:14)      SUBJECTIVE / OVERNIGHT EVENTS:   Patient seen and evaluated at bedside. No fever/chills.  Denies SOB at rest, chest pain, palpitations, abdominal pain, nausea/vomiting      MEDICATIONS  (STANDING):  amLODIPine   Tablet 2.5 milliGRAM(s) Oral daily  cefTRIAXone   IVPB 1000 milliGRAM(s) IV Intermittent every 24 hours  heparin   Injectable 5000 Unit(s) SubCutaneous every 12 hours  influenza  Vaccine (HIGH DOSE) 0.7 milliLiter(s) IntraMuscular once  melatonin 6 milliGRAM(s) Oral at bedtime  mineral oil 30 milliLiter(s) Oral once  pantoprazole    Tablet 40 milliGRAM(s) Oral before breakfast  senna 2 Tablet(s) Oral at bedtime  sodium chloride 0.9%. 1000 milliLiter(s) (100 mL/Hr) IV Continuous <Continuous>    MEDICATIONS  (PRN):  OLANZapine 1.25 milliGRAM(s) Oral daily PRN Agitation      CAPILLARY BLOOD GLUCOSE        I&O's Summary      PHYSICAL EXAM:  Vital Signs Last 24 Hrs  T(C): 36.2 (12 Dec 2020 05:56), Max: 36.9 (11 Dec 2020 21:38)  T(F): 97.2 (12 Dec 2020 05:56), Max: 98.4 (11 Dec 2020 21:38)  HR: 68 (12 Dec 2020 05:56) (68 - 100)  BP: 138/60 (12 Dec 2020 05:56) (138/60 - 173/94)  BP(mean): --  RR: 16 (12 Dec 2020 05:56) (16 - 17)  SpO2: 100% (12 Dec 2020 05:56) (96% - 100%)    CONSTITUTIONAL: NAD, well-developed  RESPIRATORY: Normal respiratory effort; lungs are clear to auscultation bilaterally  CARDIOVASCULAR: Regular rate and rhythm, normal S1 and S2, no murmur/rub/gallop; No lower extremity edema; Peripheral pulses are 2+ bilaterally  ABDOMEN: Nontender to palpation, normoactive bowel sounds, no rebound/guarding; No hepatosplenomegaly  MUSCLOSKELETAL: no clubbing or cyanosis of digits; no joint swelling or tenderness to palpation  PSYCH: A+O to person, place, and time; affect appropriate    LABS:                        12.7   18.66 )-----------( 520      ( 11 Dec 2020 14:30 )             39.9     12-11    132<L>  |  93<L>  |  15  ----------------------------<  102<H>  3.2<L>   |  25  |  0.66    Ca    9.6      11 Dec 2020 14:30    TPro  7.4  /  Alb  4.1  /  TBili  0.3  /  DBili  x   /  AST  18  /  ALT  22  /  AlkPhos  67  12-11          Urinalysis Basic - ( 11 Dec 2020 16:48 )    Color: Light Yellow / Appearance: Clear / S.009 / pH: x  Gluc: x / Ketone: Negative  / Bili: Negative / Urobili: <2 mg/dL   Blood: x / Protein: Trace / Nitrite: Negative   Leuk Esterase: Moderate / RBC: 1 /HPF / WBC 22 /HPF   Sq Epi: x / Non Sq Epi: 2 /HPF / Bacteria: Negative          RADIOLOGY & ADDITIONAL TESTS:  Results Reviewed:   Imaging Personally Reviewed:  Electrocardiogram Personally Reviewed:    COORDINATION OF CARE:  Care Discussed with Consultants/Other Providers [Y/N]:  Prior or Outpatient Records Reviewed [Y/N]:   CONTACT INFO:  Marlene Romerojak   PGY-1 Internal Medicine  [id number]    Patient is a 80y old  Female who presents with a chief complaint of "I was screaming" (12 Dec 2020 04:14)      SUBJECTIVE / OVERNIGHT EVENTS:   Patient seen and evaluated at bedside.  Pt endorses dizziness and suprapubic abdominal pain this AM  Denies SOB at rest, chest pain, palpitations, nausea/vomiting.      MEDICATIONS  (STANDING):  amLODIPine   Tablet 2.5 milliGRAM(s) Oral daily  cefTRIAXone   IVPB 1000 milliGRAM(s) IV Intermittent every 24 hours  heparin   Injectable 5000 Unit(s) SubCutaneous every 12 hours  influenza  Vaccine (HIGH DOSE) 0.7 milliLiter(s) IntraMuscular once  melatonin 6 milliGRAM(s) Oral at bedtime  mineral oil 30 milliLiter(s) Oral once  pantoprazole    Tablet 40 milliGRAM(s) Oral before breakfast  senna 2 Tablet(s) Oral at bedtime  sodium chloride 0.9%. 1000 milliLiter(s) (100 mL/Hr) IV Continuous <Continuous>    MEDICATIONS  (PRN):  OLANZapine 1.25 milliGRAM(s) Oral daily PRN Agitation      CAPILLARY BLOOD GLUCOSE        I&O's Summary      PHYSICAL EXAM:  Vital Signs Last 24 Hrs  T(C): 36.2 (12 Dec 2020 05:56), Max: 36.9 (11 Dec 2020 21:38)  T(F): 97.2 (12 Dec 2020 05:56), Max: 98.4 (11 Dec 2020 21:38)  HR: 68 (12 Dec 2020 05:56) (68 - 100)  BP: 138/60 (12 Dec 2020 05:56) (138/60 - 173/94)  BP(mean): --  RR: 16 (12 Dec 2020 05:56) (16 - 17)  SpO2: 100% (12 Dec 2020 05:56) (96% - 100%)    CONSTITUTIONAL: NAD, well-developed  RESPIRATORY: Normal respiratory effort; lungs are clear to auscultation bilaterally  CARDIOVASCULAR: Regular rate and rhythm, normal S1 and S2, no murmur/rub/gallop; No lower extremity edema; Peripheral pulses are 2+ bilaterally  ABDOMEN: suprapubic tenderness to palpation, normoactive bowel sounds, no rebound/guarding; No hepatosplenomegaly  MUSCLOSKELETAL: no clubbing or cyanosis of digits; no joint swelling or tenderness to palpation  PSYCH: A+O to person, place, and time     LABS:                        12.7   18.66 )-----------( 520      ( 11 Dec 2020 14:30 )             39.9     12-11    132<L>  |  93<L>  |  15  ----------------------------<  102<H>  3.2<L>   |  25  |  0.66    Ca    9.6      11 Dec 2020 14:30    TPro  7.4  /  Alb  4.1  /  TBili  0.3  /  DBili  x   /  AST  18  /  ALT  22  /  AlkPhos  67  12-11          Urinalysis Basic - ( 11 Dec 2020 16:48 )    Color: Light Yellow / Appearance: Clear / S.009 / pH: x  Gluc: x / Ketone: Negative  / Bili: Negative / Urobili: <2 mg/dL   Blood: x / Protein: Trace / Nitrite: Negative   Leuk Esterase: Moderate / RBC: 1 /HPF / WBC 22 /HPF   Sq Epi: x / Non Sq Epi: 2 /HPF / Bacteria: Negative          RADIOLOGY & ADDITIONAL TESTS:  Results Reviewed:   Imaging Personally Reviewed:  Electrocardiogram Personally Reviewed:    COORDINATION OF CARE:  Care Discussed with Consultants/Other Providers [Y/N]:  Prior or Outpatient Records Reviewed [Y/N]:

## 2020-12-12 NOTE — PHARMACOTHERAPY INTERVENTION NOTE - COMMENTS
Medication history is complete. Medication list updated in Outpatient Medication Record ([facility]). Please call spectra [phone] if you have any questions.   Most recent psychiatric medications dispensed in end of July/Agust 2020, see O[facility].

## 2020-12-12 NOTE — H&P ADULT - NSHPPHYSICALEXAM_GEN_ALL_CORE
Physical Exam:   General: NAD, A&Ox3, WN/WD  Eyes: PERRLA, EOMI, Vision grossly intact b/l  ENT: MMM, no stridor, no pharyngeal/tonsilar erythema/edema, hearing grossly intact  Neck: Supple, trachea midline, no JVD, no thyromegaly/nodules, no lymphademopathy, no ttp  Resp: CTA b/l, no wheezing, rales, rhonchi  Cardio: RRR, nl S1/2, no murmurs, rubs, or gallops  Abd: moderate abd distension, mild tenderness to palpation in epigastrum, RLQ, soft, hyperactive bowel sounds.   Extremities: no edema, radial/DP pulses 2+ b/l, no acrocyanosis, Cap refill<3 sec  Neuro: Strength 5/5 in UE/LE b/l, sensation equal/intact b/l,   MSK: No spinal/paraspinal tenderness to palpation, full ROM.   Skin: no rashes, ecchymosis, normal temp, turgur  Lymph: no neck, axilla, groin LAD  Psych: appropriate mood/affect Physical Exam:   General: NAD, A&Ox3, WN/WD  Eyes: PERRLA, EOMI, Vision grossly intact b/l  ENT: MMM, no stridor, no pharyngeal/tonsilar erythema/edema, hearing grossly intact  Neck: Supple, trachea midline, no JVD, no thyromegaly/nodules, no lymphademopathy, no ttp  Resp: CTA b/l, no wheezing, rales, rhonchi  Cardio: RRR, nl S1/2, no murmurs, rubs, or gallops  Abd: moderate abd distension, mild tenderness to palpation in epigastrum, RLQ, soft, hyperactive bowel sounds. No CVA tenderness.   Extremities: no edema, radial/DP pulses 2+ b/l, no acrocyanosis, Cap refill<3 sec  Neuro: Strength 5/5 in UE/LE b/l, sensation equal/intact b/l,   MSK: No spinal/paraspinal tenderness to palpation, full ROM.   Skin: no rashes, ecchymosis, normal temp, turgur  Lymph: no neck, axilla, groin LAD  Psych: appropriate mood/affect Physical Exam:   General: NAD, A&Ox3, WN/WD  Eyes: PERRLA, EOMI, Vision grossly intact b/l  ENT: MMM, no stridor, no pharyngeal/tonsilar erythema/edema, hearing grossly intact  Neck: Supple, trachea midline, no JVD, no thyromegaly/nodules, no lymphadenopathy, no ttp  Resp: CTA b/l, no wheezing, rales, rhonchi  Cardio: RRR, nl S1/2, no murmurs, rubs, or gallops  Abd: no abd distension, mild TTP suprapubic area, soft, hyperactive bowel sounds. No CVA tenderness.   Extremities: no edema, radial/DP pulses 2+ b/l, no acrocyanosis, Cap refill<3 sec  Neuro: Strength 5/5 in UE/LE b/l, sensation equal/intact b/l,   MSK: No spinal/paraspinal tenderness to palpation, full ROM.   Skin: no rashes, ecchymosis  Lymph: no neck, axilla, groin LAD

## 2020-12-12 NOTE — H&P ADULT - PROBLEM SELECTOR PLAN 3
- CTH neg  - fall precautions  - orthostatics - CTH neg  - fall precautions  - orthostatics  - PT eval - CTH neg  - EKG: NSR  - fall precautions  - orthostatics  - PT eval - CTH neg  - EKG: NSR  - fall precautions  - orthostatics x3 q6h  - PT eval Multifactorial, due to gen weakness, UTI, mild hyponatremia and hypokalemia;   - CTH neg  - EKG: NSR  - f/u CPK, Phosph  - fall precautions  - orthostatics x3 q6h  - PT eval

## 2020-12-12 NOTE — PROGRESS NOTE ADULT - PROBLEM SELECTOR PLAN 3
Multifactorial, due to gen weakness, UTI, mild hyponatremia and hypokalemia;   - CTH neg  - EKG: NSR  - f/u CPK, Phosph  - fall precautions  - orthostatics x3 q6h  - PT eval

## 2020-12-13 LAB
ANION GAP SERPL CALC-SCNC: 11 MMOL/L — SIGNIFICANT CHANGE UP (ref 7–14)
BUN SERPL-MCNC: 16 MG/DL — SIGNIFICANT CHANGE UP (ref 7–23)
CALCIUM SERPL-MCNC: 9.1 MG/DL — SIGNIFICANT CHANGE UP (ref 8.4–10.5)
CHLORIDE SERPL-SCNC: 102 MMOL/L — SIGNIFICANT CHANGE UP (ref 98–107)
CO2 SERPL-SCNC: 23 MMOL/L — SIGNIFICANT CHANGE UP (ref 22–31)
CREAT SERPL-MCNC: 0.81 MG/DL — SIGNIFICANT CHANGE UP (ref 0.5–1.3)
GLUCOSE SERPL-MCNC: 98 MG/DL — SIGNIFICANT CHANGE UP (ref 70–99)
HCT VFR BLD CALC: 33 % — LOW (ref 34.5–45)
HGB BLD-MCNC: 10.6 G/DL — LOW (ref 11.5–15.5)
MAGNESIUM SERPL-MCNC: 2.1 MG/DL — SIGNIFICANT CHANGE UP (ref 1.6–2.6)
MCHC RBC-ENTMCNC: 29 PG — SIGNIFICANT CHANGE UP (ref 27–34)
MCHC RBC-ENTMCNC: 32.1 GM/DL — SIGNIFICANT CHANGE UP (ref 32–36)
MCV RBC AUTO: 90.2 FL — SIGNIFICANT CHANGE UP (ref 80–100)
NRBC # BLD: 0 /100 WBCS — SIGNIFICANT CHANGE UP
NRBC # FLD: 0 K/UL — SIGNIFICANT CHANGE UP
PHOSPHATE SERPL-MCNC: 3.5 MG/DL — SIGNIFICANT CHANGE UP (ref 2.5–4.5)
PLATELET # BLD AUTO: 372 K/UL — SIGNIFICANT CHANGE UP (ref 150–400)
POTASSIUM SERPL-MCNC: 4 MMOL/L — SIGNIFICANT CHANGE UP (ref 3.5–5.3)
POTASSIUM SERPL-SCNC: 4 MMOL/L — SIGNIFICANT CHANGE UP (ref 3.5–5.3)
RBC # BLD: 3.66 M/UL — LOW (ref 3.8–5.2)
RBC # FLD: 13.1 % — SIGNIFICANT CHANGE UP (ref 10.3–14.5)
SODIUM SERPL-SCNC: 136 MMOL/L — SIGNIFICANT CHANGE UP (ref 135–145)
WBC # BLD: 7.93 K/UL — SIGNIFICANT CHANGE UP (ref 3.8–10.5)
WBC # FLD AUTO: 7.93 K/UL — SIGNIFICANT CHANGE UP (ref 3.8–10.5)

## 2020-12-13 PROCEDURE — 99233 SBSQ HOSP IP/OBS HIGH 50: CPT | Mod: GC

## 2020-12-13 PROCEDURE — 99233 SBSQ HOSP IP/OBS HIGH 50: CPT

## 2020-12-13 RX ADMIN — HEPARIN SODIUM 5000 UNIT(S): 5000 INJECTION INTRAVENOUS; SUBCUTANEOUS at 16:33

## 2020-12-13 RX ADMIN — SENNA PLUS 2 TABLET(S): 8.6 TABLET ORAL at 23:15

## 2020-12-13 RX ADMIN — AMLODIPINE BESYLATE 2.5 MILLIGRAM(S): 2.5 TABLET ORAL at 07:02

## 2020-12-13 RX ADMIN — RISPERIDONE 0.25 MILLIGRAM(S): 4 TABLET ORAL at 23:15

## 2020-12-13 RX ADMIN — HEPARIN SODIUM 5000 UNIT(S): 5000 INJECTION INTRAVENOUS; SUBCUTANEOUS at 07:02

## 2020-12-13 RX ADMIN — RISPERIDONE 0.25 MILLIGRAM(S): 4 TABLET ORAL at 11:03

## 2020-12-13 RX ADMIN — Medication 6 MILLIGRAM(S): at 23:15

## 2020-12-13 RX ADMIN — PANTOPRAZOLE SODIUM 40 MILLIGRAM(S): 20 TABLET, DELAYED RELEASE ORAL at 07:01

## 2020-12-13 NOTE — PROGRESS NOTE ADULT - PROBLEM SELECTOR PLAN 2
Symptomatic; PE (+) suprapubic tenderness;    - s/p 10 day course of macrobid outpatient   - UA (+) mod LE, more consistent with pyuria than bacteriuria   - s/p 1 dose of abx, will monitor off abx  - trend WBC, monitor for fever   - f/u UCx

## 2020-12-13 NOTE — BH CONSULTATION LIAISON PROGRESS NOTE - NSBHATTENDATTEST_PSY_ALL_CORE
I have personally seen, examined and participated in the care of this patient. I have reviewed all pertinent clinical information, including history, physical exam, plan and the Medical/PA/NP Student’s note and agree except as noted.
I have personally seen, examined and participated in the care of this patient. I have reviewed all pertinent clinical information, including history, physical exam, plan and the Medical/PA/NP Student’s note and agree except as noted.

## 2020-12-13 NOTE — BH CONSULTATION LIAISON PROGRESS NOTE - NSBHFUPINTERVALHXFT_PSY_A_CORE
Patient seen and evaluated, staff consulted, chart, labs, meds reviewed. Slept well overnight, no PRNs required. Patient calm and cooperative with interview. States she has done bad things and that "they are going to make me suffer". She does not know who she means by "they". She does not feel safe in the hospital. Denies AH, thought withdrawal, but endorses vague paranoid and persecutory delusions, referential delusions (tv and signs/books sending her messages), thought insertion and thought broadcasting. States she is always very sad, anxious. C/o passive SI, denies active SI or HI.

## 2020-12-13 NOTE — BH CONSULTATION LIAISON PROGRESS NOTE - NSBHASSESSMENTFT_PSY_ALL_CORE
80 year old   female currently residing alone. PPH MDD/Anxiety after her husbands death 3 years ago. No hx of inpatient admissions or suicide attempts. No hx of aggression, violence, legal issues or substance abuse problems. PMH-HTN, HLD, OAB, Cholelithiasis with acute cholecystitis & Diverticulosis. BIB family referred by [facility]/MD for psychosis. Patient presented to the ED in the context of psychosis. Patient presents with paranoid delusions  (feels that her neighbors are mixing something in her food to make her uncomfortable constipated. She  reports her reasons for not eating as the blockage in her lower abdomen. She has endorsed suicidal ideation to son (denies active SI/plan/intent) as well as to CL team, and depression related to her paranoid delusions. Patient denies active SI/plan/intent. Patient not caring for self- losing weight, decrease in ADLs. She is not at baseline with poor insight. She presents at imminent risk to self and continues to require inpatient admission for safety and stabilization.     Patient's paranoia  seems to be going on for a year. She has h/o fall because of generalized weakness caused by  poor oral intake , this time she is presenting with high WBC. She is  medically admitted  for cystitis and will  be continued  to be followed by  psychiatry.     12/13: Slept well overnight, no PRNs required. Patient calm and cooperative with interview. States she has done bad things and that "they are going to make me suffer". She does not know who she means by "they". She does not feel safe in the hospital. Denies AH, thought withdrawal, but endorses vague paranoid and persecutory delusions, referential delusions (tv and signs/books sending her messages), thought insertion and thought broadcasting. States she is always very sad, anxious. C/o passive SI, denies active SI or HI. AAOx3.    PLAN:  1. C/w Risperdal 0.25mg PO BID for psychosis/delusional disorder  2. Continue Zyprexa 1.25mg PO/IM PRN for agitation  3. Patient is  unable to clearly engage in discussion regarding the cause of her problems, need for investigation and treatment , risks and benefits of treatment with writer, so she does not seem to have  capacity to refuse medical interventions or has  understanding of the reasoning for her medical admission and does not have capacity to leave AMA.

## 2020-12-13 NOTE — BH CONSULTATION LIAISON PROGRESS NOTE - CASE SUMMARY
80 year old   female currently residing alone. PPH MDD/Anxiety after her husbands death 3 years ago. No hx of inpatient admissions or suicide attempts. No hx of aggression, violence, legal issues or substance abuse problems. PMH-HTN, HLD, OAB, Cholelithiasis with acute cholecystitis & Diverticulosis. BIB family referred by [facility]/MD for psychosis.  On evaluation, pt. remains psychotic endorsing paranoid thoughts, talking about though insertion and thought broadcasting. Pt. notes that someone has been drugging her at home. She notes that her children and others are going to make her suffer. She appears calm and cooperative. No overnight events and no prns were given. Pt. was started on risperidone yesterday.    Cont. current management.

## 2020-12-13 NOTE — PROGRESS NOTE ADULT - ASSESSMENT
79yo Female MHx HTN, Anxiety/Depression, diverticulosis a/w brief episode of psychosis; Found to have pyuria on UA but no bacteriuria.

## 2020-12-13 NOTE — PROGRESS NOTE ADULT - PROBLEM SELECTOR PLAN 1
Exhibits fair insight; Acknowledges that the episode was irrational and provoked by frustration; Low suspicion of delirium 2/2 infectious etiology given rapid resolution of symptoms prior Abx administration;   - TSH wnl  - Urine and serum toxicology negative   - Zyprexa 1.25 prn agitation, per psych  - Psych c/s: pt does not have capacity to leave AMA but does have capacity to refuse care  - SW c/s

## 2020-12-13 NOTE — PROGRESS NOTE ADULT - SUBJECTIVE AND OBJECTIVE BOX
PROGRESS NOTE:   Briseydaphil BerryTess  PGY2 Internal Medicine  Pager [phone]    Patient is a 80y old  Female who presents with a chief complaint of "I was screaming" (12 Dec 2020 07:14)      SUBJECTIVE / OVERNIGHT EVENTS:    ADDITIONAL REVIEW OF SYSTEMS:    MEDICATIONS  (STANDING):  amLODIPine   Tablet 2.5 milliGRAM(s) Oral daily  heparin   Injectable 5000 Unit(s) SubCutaneous every 12 hours  influenza  Vaccine (HIGH DOSE) 0.7 milliLiter(s) IntraMuscular once  melatonin 6 milliGRAM(s) Oral at bedtime  pantoprazole    Tablet 40 milliGRAM(s) Oral before breakfast  risperiDONE   Tablet 0.25 milliGRAM(s) Oral two times a day  senna 2 Tablet(s) Oral at bedtime  sodium chloride 0.9%. 1000 milliLiter(s) (100 mL/Hr) IV Continuous <Continuous>    MEDICATIONS  (PRN):  OLANZapine 1.25 milliGRAM(s) Oral daily PRN Agitation      CAPILLARY BLOOD GLUCOSE        I&O's Summary      PHYSICAL EXAM:  Vital Signs Last 24 Hrs  T(C): 36.6 (13 Dec 2020 06:58), Max: 36.6 (12 Dec 2020 12:50)  T(F): 97.8 (13 Dec 2020 06:58), Max: 97.9 (12 Dec 2020 12:50)  HR: 67 (13 Dec 2020 06:58) (67 - 90)  BP: 131/60 (13 Dec 2020 06:58) (123/65 - 131/60)  BP(mean): --  RR: 16 (13 Dec 2020 06:58) (16 - 17)  SpO2: 100% (13 Dec 2020 06:58) (98% - 100%)    GENERAL: No acute distress, well-developed  HEAD:  Atraumatic, Normocephalic  EYES: EOMI, PERRLA, conjunctiva and sclera clear  NECK: Supple, no lymphadenopathy, no JVD  CHEST/LUNG: CTAB; No wheezes, rales, or rhonchi  HEART: Regular rate and rhythm; No murmurs, rubs, or gallops  ABDOMEN: Soft, non-tender, non-distended; normal bowel sounds, no organomegaly  EXTREMITIES:  2+ peripheral pulses b/l, No clubbing, cyanosis, or edema  NEUROLOGY: A&O x 3, no focal deficits  SKIN: No rashes or lesions    LABS:                        10.6   7.93  )-----------( 372      ( 13 Dec 2020 07:29 )             33.0     12-13    x   |  x   |  16  ----------------------------<  98  x    |  23  |  0.81    Ca    9.1      13 Dec 2020 07:29  Phos  3.5     12-13  Mg     2.1     12-13    TPro  7.4  /  Alb  4.1  /  TBili  0.3  /  DBili  x   /  AST  18  /  ALT  22  /  AlkPhos  67  12-11          Urinalysis Basic - ( 11 Dec 2020 16:48 )    Color: Light Yellow / Appearance: Clear / S.009 / pH: x  Gluc: x / Ketone: Negative  / Bili: Negative / Urobili: <2 mg/dL   Blood: x / Protein: Trace / Nitrite: Negative   Leuk Esterase: Moderate / RBC: 1 /HPF / WBC 22 /HPF   Sq Epi: x / Non Sq Epi: 2 /HPF / Bacteria: Negative          RADIOLOGY & ADDITIONAL TESTS:  Results Reviewed:   Imaging Personally Reviewed:  Electrocardiogram Personally Reviewed:    COORDINATION OF CARE:  Care Discussed with Consultants/Other Providers [Y/N]:  Prior or Outpatient Records Reviewed [Y/N]:   PROGRESS NOTE:   Briseyda Tess  PGY2 Internal Medicine  Pager [phone]    Patient is a 80y old  Female who presents with a chief complaint of "I was screaming" (12 Dec 2020 07:14)      SUBJECTIVE / OVERNIGHT EVENTS:  No overnight events, no complaints    ADDITIONAL REVIEW OF SYSTEMS:    MEDICATIONS  (STANDING):  amLODIPine   Tablet 2.5 milliGRAM(s) Oral daily  heparin   Injectable 5000 Unit(s) SubCutaneous every 12 hours  influenza  Vaccine (HIGH DOSE) 0.7 milliLiter(s) IntraMuscular once  melatonin 6 milliGRAM(s) Oral at bedtime  pantoprazole    Tablet 40 milliGRAM(s) Oral before breakfast  risperiDONE   Tablet 0.25 milliGRAM(s) Oral two times a day  senna 2 Tablet(s) Oral at bedtime  sodium chloride 0.9%. 1000 milliLiter(s) (100 mL/Hr) IV Continuous <Continuous>    MEDICATIONS  (PRN):  OLANZapine 1.25 milliGRAM(s) Oral daily PRN Agitation      CAPILLARY BLOOD GLUCOSE        I&O's Summary      PHYSICAL EXAM:  Vital Signs Last 24 Hrs  T(C): 36.6 (13 Dec 2020 06:58), Max: 36.6 (12 Dec 2020 12:50)  T(F): 97.8 (13 Dec 2020 06:58), Max: 97.9 (12 Dec 2020 12:50)  HR: 67 (13 Dec 2020 06:58) (67 - 90)  BP: 131/60 (13 Dec 2020 06:58) (123/65 - 131/60)  BP(mean): --  RR: 16 (13 Dec 2020 06:58) (16 - 17)  SpO2: 100% (13 Dec 2020 06:58) (98% - 100%)    CONSTITUTIONAL: NAD, well-developed  RESPIRATORY: Normal respiratory effort; lungs are clear to auscultation bilaterally  CARDIOVASCULAR: Regular rate and rhythm, normal S1 and S2, no murmur/rub/gallop; No lower extremity edema; Peripheral pulses are 2+ bilaterally  ABDOMEN: suprapubic tenderness to palpation, normoactive bowel sounds, no rebound/guarding; No hepatosplenomegaly  MUSCLOSKELETAL: no clubbing or cyanosis of digits; no joint swelling or tenderness to palpation  PSYCH: A+O to person, place, and time     LABS:                        10.6   7.93  )-----------( 372      ( 13 Dec 2020 07:29 )             33.0     12-13    x   |  x   |  16  ----------------------------<  98  x    |  23  |  0.81    Ca    9.1      13 Dec 2020 07:29  Phos  3.5     12-13  Mg     2.1     12-13    TPro  7.4  /  Alb  4.1  /  TBili  0.3  /  DBili  x   /  AST  18  /  ALT  22  /  AlkPhos  67  12-11          Urinalysis Basic - ( 11 Dec 2020 16:48 )    Color: Light Yellow / Appearance: Clear / S.009 / pH: x  Gluc: x / Ketone: Negative  / Bili: Negative / Urobili: <2 mg/dL   Blood: x / Protein: Trace / Nitrite: Negative   Leuk Esterase: Moderate / RBC: 1 /HPF / WBC 22 /HPF   Sq Epi: x / Non Sq Epi: 2 /HPF / Bacteria: Negative          RADIOLOGY & ADDITIONAL TESTS:  Results Reviewed:   Imaging Personally Reviewed:  Electrocardiogram Personally Reviewed:    COORDINATION OF CARE:  Care Discussed with Consultants/Other Providers [Y/N]:  Prior or Outpatient Records Reviewed [Y/N]:

## 2020-12-14 ENCOUNTER — TRANSCRIPTION ENCOUNTER (OUTPATIENT)
Age: 80
End: 2020-12-14

## 2020-12-14 LAB
ANION GAP SERPL CALC-SCNC: 12 MMOL/L — SIGNIFICANT CHANGE UP (ref 7–14)
BUN SERPL-MCNC: 13 MG/DL — SIGNIFICANT CHANGE UP (ref 7–23)
CALCIUM SERPL-MCNC: 9.1 MG/DL — SIGNIFICANT CHANGE UP (ref 8.4–10.5)
CHLORIDE SERPL-SCNC: 101 MMOL/L — SIGNIFICANT CHANGE UP (ref 98–107)
CO2 SERPL-SCNC: 23 MMOL/L — SIGNIFICANT CHANGE UP (ref 22–31)
CREAT SERPL-MCNC: 0.64 MG/DL — SIGNIFICANT CHANGE UP (ref 0.5–1.3)
GLUCOSE SERPL-MCNC: 101 MG/DL — HIGH (ref 70–99)
HCT VFR BLD CALC: 36 % — SIGNIFICANT CHANGE UP (ref 34.5–45)
HGB BLD-MCNC: 11.8 G/DL — SIGNIFICANT CHANGE UP (ref 11.5–15.5)
MAGNESIUM SERPL-MCNC: 2 MG/DL — SIGNIFICANT CHANGE UP (ref 1.6–2.6)
MCHC RBC-ENTMCNC: 29.5 PG — SIGNIFICANT CHANGE UP (ref 27–34)
MCHC RBC-ENTMCNC: 32.8 GM/DL — SIGNIFICANT CHANGE UP (ref 32–36)
MCV RBC AUTO: 90 FL — SIGNIFICANT CHANGE UP (ref 80–100)
NRBC # BLD: 0 /100 WBCS — SIGNIFICANT CHANGE UP
NRBC # FLD: 0 K/UL — SIGNIFICANT CHANGE UP
PHOSPHATE SERPL-MCNC: 3 MG/DL — SIGNIFICANT CHANGE UP (ref 2.5–4.5)
PLATELET # BLD AUTO: 364 K/UL — SIGNIFICANT CHANGE UP (ref 150–400)
POTASSIUM SERPL-MCNC: 3.6 MMOL/L — SIGNIFICANT CHANGE UP (ref 3.5–5.3)
POTASSIUM SERPL-SCNC: 3.6 MMOL/L — SIGNIFICANT CHANGE UP (ref 3.5–5.3)
RBC # BLD: 4 M/UL — SIGNIFICANT CHANGE UP (ref 3.8–5.2)
RBC # FLD: 12.8 % — SIGNIFICANT CHANGE UP (ref 10.3–14.5)
SARS-COV-2 IGG SERPL IA-ACNC: 1.7 RATIO — HIGH
SARS-COV-2 IGG SERPL QL IA: NEGATIVE — SIGNIFICANT CHANGE UP
SARS-COV-2 IGG SERPL QL IA: POSITIVE
SARS-COV-2 IGM SERPL IA-ACNC: 0.55 RATIO — SIGNIFICANT CHANGE UP
SARS-COV-2 RNA SPEC QL NAA+PROBE: SIGNIFICANT CHANGE UP
SODIUM SERPL-SCNC: 136 MMOL/L — SIGNIFICANT CHANGE UP (ref 135–145)
TSH SERPL-MCNC: 2.36 UIU/ML — SIGNIFICANT CHANGE UP (ref 0.27–4.2)
WBC # BLD: 6.07 K/UL — SIGNIFICANT CHANGE UP (ref 3.8–10.5)
WBC # FLD AUTO: 6.07 K/UL — SIGNIFICANT CHANGE UP (ref 3.8–10.5)

## 2020-12-14 PROCEDURE — 99233 SBSQ HOSP IP/OBS HIGH 50: CPT | Mod: GC

## 2020-12-14 PROCEDURE — 99232 SBSQ HOSP IP/OBS MODERATE 35: CPT

## 2020-12-14 RX ADMIN — PANTOPRAZOLE SODIUM 40 MILLIGRAM(S): 20 TABLET, DELAYED RELEASE ORAL at 06:51

## 2020-12-14 RX ADMIN — HEPARIN SODIUM 5000 UNIT(S): 5000 INJECTION INTRAVENOUS; SUBCUTANEOUS at 17:44

## 2020-12-14 RX ADMIN — AMLODIPINE BESYLATE 2.5 MILLIGRAM(S): 2.5 TABLET ORAL at 06:51

## 2020-12-14 RX ADMIN — RISPERIDONE 0.25 MILLIGRAM(S): 4 TABLET ORAL at 10:54

## 2020-12-14 RX ADMIN — Medication 6 MILLIGRAM(S): at 22:10

## 2020-12-14 RX ADMIN — RISPERIDONE 0.25 MILLIGRAM(S): 4 TABLET ORAL at 22:10

## 2020-12-14 RX ADMIN — HEPARIN SODIUM 5000 UNIT(S): 5000 INJECTION INTRAVENOUS; SUBCUTANEOUS at 06:51

## 2020-12-14 RX ADMIN — SENNA PLUS 2 TABLET(S): 8.6 TABLET ORAL at 22:10

## 2020-12-14 NOTE — PROGRESS NOTE ADULT - ASSESSMENT
81yo Female MHx HTN, Anxiety/Depression, diverticulosis a/w brief episode of psychosis; Found to have pyuria on UA but no bacteriuria.

## 2020-12-14 NOTE — BH CONSULTATION LIAISON PROGRESS NOTE - NSBHASSESSMENTFT_PSY_ALL_CORE
80 year old   female currently residing alone. PPH MDD/Anxiety after her husbands death 3 years ago. No hx of inpatient admissions or suicide attempts. No hx of aggression, violence, legal issues or substance abuse problems. PMH-HTN, HLD, OAB, Cholelithiasis with acute cholecystitis & Diverticulosis. BIB family referred by [facility]/MD for psychosis. Patient presented to the ED in the context of psychosis. Patient presents with paranoid delusions  (feels that her neighbors are mixing something in her food to make her uncomfortable constipated. She  reports her reasons for not eating as the blockage in her lower abdomen. She has endorsed suicidal ideation to son (denies active SI/plan/intent) as well as to CL team, and depression related to her paranoid delusions. Patient denies active SI/plan/intent. Patient not caring for self- losing weight, decrease in ADLs. She is not at baseline with poor insight. She presents at imminent risk to self and continues to require inpatient admission for safety and stabilization.     Patient's paranoia  seems to be going on for a year. She has h/o fall because of generalized weakness caused by  poor oral intake , this time she is presenting with high WBC. She is  medically admitted  for cystitis and will  be continued  to be followed by  psychiatry.     12/13: Slept well overnight, no PRNs required. Patient calm and cooperative with interview. States she has done bad things and that "they are going to make me suffer". She does not know who she means by "they". She does not feel safe in the hospital. Denies AH, thought withdrawal, but endorses vague paranoid and persecutory delusions, referential delusions (tv and signs/books sending her messages), thought insertion and thought broadcasting. States she is always very sad, anxious. C/o passive SI, denies active SI or HI. AAOx3.    12/14: Did not sleep well o/n. Patient guarded, repeating multiple times that she wants to go home. Reports paranoia and delusions that her neighbors are watching her and poisoning her food. Patient minimally able to reality check. Given the time frame of her symptoms, DDx includes MDD with psychosis vs. primary psychosis, with delirium potentially worsening recent symptoms.     PLAN:  1. C/w Risperdal 0.25mg PO BID for psychosis/delusional disorder  2. Continue Zyprexa 1.25mg PO/IM PRN for agitation  3. Patient is  unable to clearly engage in discussion regarding the cause of her problems, need for investigation and treatment , risks and benefits of treatment with writer, so she does not seem to have  capacity to refuse medical interventions or has  understanding of the reasoning for her medical admission and does not have capacity to leave AMA.  4. Will likely require psychiatric inpatient admission after medical clearance as she is not able to care for herself at home and therefore presents a danger to herself due to severity of psychotic symptoms. 80 year old   female currently residing alone. PPH MDD/Anxiety after her husbands death 3 years ago. No hx of inpatient admissions or suicide attempts. No hx of aggression, violence, legal issues or substance abuse problems. PMH-HTN, HLD, OAB, Cholelithiasis with acute cholecystitis & Diverticulosis. BIB family referred by [facility]MD for psychosis. Patient presented to the ED in the context of psychosis. Patient presents with paranoid delusions  (feels that her neighbors are mixing something in her food to make her uncomfortable constipated. She  reports her reasons for not eating as the blockage in her lower abdomen. She has endorsed suicidal ideation to son (denies active SI/plan/intent) as well as to CL team, and depression related to her paranoid delusions. Patient denies active SI/plan/intent. Patient not caring for self- losing weight, decrease in ADLs. She is not at baseline with poor insight. She presents at imminent risk to self and continues to require inpatient admission for safety and stabilization.     Patient's paranoia  seems to be going on for a year. She has h/o fall because of generalized weakness caused by  poor oral intake , this time she is presenting with high WBC. She is  medically admitted  for cystitis and will  be continued  to be followed by  psychiatry.     12/13: Slept well overnight, no PRNs required. Patient calm and cooperative with interview. States she has done bad things and that "they are going to make me suffer". She does not know who she means by "they". She does not feel safe in the hospital. Denies AH, thought withdrawal, but endorses vague paranoid and persecutory delusions, referential delusions (tv and signs/books sending her messages), thought insertion and thought broadcasting. States she is always very sad, anxious. C/o passive SI, denies active SI or HI. AAOx3.    12/14: Did not sleep well o/n. Patient guarded, repeating multiple times that she wants to go home. Reports paranoia and delusions that her neighbors are watching her and poisoning her food. Patient minimally able to reality check. Given the time frame of her symptoms, DDx includes MDD with psychosis vs. primary psychosis, with delirium potentially worsening recent symptoms. R/o underlying cognitive impairment.    PLAN:  1. C/w Risperdal 0.25mg PO BID for psychosis/delusional disorder if qtc <500  2. Continue Zyprexa 1.25mg PO/IM PRN for agitation if qtc <500  3. Patient is  unable to clearly engage in discussion regarding the cause of her problems, need for investigation and treatment , risks and benefits of treatment with writer, so she does not seem to have  capacity to refuse medical interventions or has  understanding of the reasoning for her medical admission and does not have capacity to leave AMA.  4. Will likely require psychiatric inpatient admission after medical clearance as she is not able to care for herself at home and therefore presents a danger to herself due to severity of psychotic symptoms.

## 2020-12-14 NOTE — BH CONSULTATION LIAISON PROGRESS NOTE - NSBHCHARTREVIEWLAB_PSY_A_CORE FT
12.7   18.66 )-----------( 520      ( 11 Dec 2020 14:30 )             39.9     12-11    132<L>  |  93<L>  |  15  ----------------------------<  102<H>  3.2<L>   |  25  |  0.66    Ca    9.6      11 Dec 2020 14:30    TPro  7.4  /  Alb  4.1  /  TBili  0.3  /  DBili  x   /  AST  18  /  ALT  22  /  AlkPhos  67  12-11      Urinalysis Basic - ( 11 Dec 2020 16:48 )  Color: Light Yellow / Appearance: Clear / S.009 / pH: x  Gluc: x / Ketone: Negative  / Bili: Negative / Urobili: <2 mg/dL   Blood: x / Protein: Trace / Nitrite: Negative   Leuk Esterase: Moderate / RBC: 1 /HPF / WBC 22 /HPF   Sq Epi: x / Non Sq Epi: 2 /HPF / Bacteria: Negative

## 2020-12-14 NOTE — DISCHARGE NOTE PROVIDER - NSDCCPCAREPLAN_GEN_ALL_CORE_FT
PRINCIPAL DISCHARGE DIAGNOSIS  Diagnosis: Psychosis, unspecified psychosis type  Assessment and Plan of Treatment: You presented to the hospital after a       PRINCIPAL DISCHARGE DIAGNOSIS  Diagnosis: Psychosis, unspecified psychosis type  Assessment and Plan of Treatment: You presented to the hospital after an episode of agitation and hitting your head on the wall.  You were found to have no medical problems in the hospital. However our psychiatrist doctors suggested additional follow up at our [facility] geriatric service, for your depression and agitation.

## 2020-12-14 NOTE — DISCHARGE NOTE PROVIDER - HOSPITAL COURSE
79 y/o Female PMHx HTN, Anxiety/Depression, diverticulosis sent to ED by son after an episode of agitation. Pt with long hx of anxiety/depression that has worsened over the past 4 months, recently resulting in multiple outbursts of agitation, verbally abusing her family and hitting herself.  She endorses insomnia, decreased appetite, paranoia, anxiety, lack of energy, and passive SI, stating she "thinks about harming herself but never would because she loves her sons." She adamantly denies suicidal intent or plan.  Pt endorsed to psychiatry, delusions of people poisoning her food to make her constipated and of police plotting to come take her away to do bad things.  Psych has been following, pt started on Risperdal 0.25 BID for psychosis/delusional disorder with zyprexa 1.25 PRN for agitation.  Ct head positive for volume loss and possibly microvascular disease. Per psych pt will need inpatient psych for stabilization.     Prior to arrival at hospital, pt fell on her knees and hit her head on the wall but states no LOC, vision changes, HA, numbness/tingling, loss of bowel or bladder.  Low suspicion for syncopal episodes, spoke to family for more collateral, state that pt deliberately hit her head against the wall and has never had a witnessed syncopal episode.  Orthostatics on arrival was +, likely due to being volume down with decreased PO intake.  All additiional orthostatics have been wnl. Pt had a recent UTI s/p completion of 10 day course of macrobid.  In the hospital her UA showed sterile pyuria, pt did not receive additional abx inpatient. 81 y/o Female PMHx HTN, Anxiety/Depression, diverticulosis sent to ED by son after an episode of agitation. Pt with long hx of anxiety/depression that has worsened over the past 4 months, recently resulting in multiple outbursts of agitation, verbally abusing her family and hitting herself.  She endorses insomnia, decreased appetite, paranoia, anxiety, lack of energy, and passive SI, stating she "thinks about harming herself but never would because she loves her sons." She adamantly denies suicidal intent or plan.  Pt endorsed to psychiatry, delusions of people poisoning her food to make her constipated and of police plotting to come take her away to do bad things.  Psych has been following, pt started on Risperdal 0.25 in the morning and 0.5 at bedtime for psychosis/delusional disorder with zyprexa 1.25 PRN for agitation.  Ct head positive for volume loss and possibly microvascular disease. Per psych pt will need inpatient psych for stabilization.  Prior to arrival at hospital, pt fell on her knees and hit her head on the wall but states no LOC, vision changes, HA, numbness/tingling, loss of bowel or bladder.  Low suspicion for syncopal episodes, spoke to family for more collateral, state that pt deliberately hit her head against the wall and has never had a witnessed syncopal episode.  Orthostatics on arrival was +, likely due to being volume down with decreased PO intake.  All additional orthostatics have been wnl. Pt had a recent UTI s/p completion of 10 day course of macrobid.  In the hospital her UA showed sterile pyuria, pt did not receive additional abx inpatient.  Pt has been tolerating a regular diet, and is clear from a medical standpoint. 81 y/o Female PMHx HTN, Anxiety/Depression, diverticulosis sent to ED by son after an episode of agitation. Pt with long hx of anxiety/depression that has worsened over the past 4 months, recently resulting in multiple outbursts of agitation, verbally abusing her family and hitting herself.  She endorses insomnia, decreased appetite, paranoia, anxiety, lack of energy, and passive SI, stating she "thinks about harming herself but never would because she loves her sons." She adamantly denies suicidal intent or plan.  Pt endorsed to psychiatry, delusions of people poisoning her food to make her constipated and of police plotting to come take her away to do bad things.  Psych has been following, pt started on Risperdal 0.25 in the morning and 0.5 at bedtime for psychosis/delusional disorder with zyprexa 1.25 PRN for agitation.  Ct head positive for volume loss and possibly microvascular disease. Per psych pt will need inpatient psych for stabilization.  Prior to arrival at hospital, pt fell on her knees and hit her head on the wall but states no LOC, vision changes, HA, numbness/tingling, loss of bowel or bladder.  Low suspicion for syncopal episodes, spoke to family for more collateral, state that pt deliberately hit her head against the wall and has never had a witnessed syncopal episode.  Orthostatics on arrival was +, likely due to being volume down with decreased PO intake.  All additional orthostatics have been wnl. Pt had a recent UTI s/p completion of 10 day course of macrobid.  In the hospital her UA showed sterile pyuria, pt did not receive additional abx inpatient.  Pt has been tolerating a regular diet, and is clear from a medical standpoint. Please f/u PCP with Dr. Tyler Ellis within 1-2 weeks of discharge from [facility]. 79 y/o Female PMHx HTN, Anxiety/Depression, diverticulosis sent to ED by son after an episode of agitation. Pt with long hx of anxiety/depression that has worsened over the past 4 months, recently resulting in multiple outbursts of agitation, verbally abusing her family and hitting herself.  She endorses insomnia, decreased appetite, paranoia, anxiety, lack of energy, and passive SI, stating she "thinks about harming herself but never would because she loves her sons." She adamantly denies suicidal intent or plan.  Pt endorsed to psychiatry, delusions of people poisoning her food to make her constipated and of police plotting to come take her away to do bad things.  Psych has been following, pt started on Risperdal 0.25 in the morning and 0.5 at bedtime for psychosis/delusional disorder with zyprexa 1.25 PRN for agitation.  Ct head positive for volume loss and possibly microvascular disease. Per psych pt will need inpatient psych for stabilization.  Prior to arrival at hospital, pt fell on her knees and hit her head on the wall but states no LOC, vision changes, HA, numbness/tingling, loss of bowel or bladder.  Low suspicion for syncopal episodes, spoke to family for more collateral, state that pt deliberately hit her head against the wall and has never had a witnessed syncopal episode.  Orthostatics on arrival was +, likely due to being volume down with decreased PO intake.  All additional orthostatics have been wnl. Pt had a recent UTI s/p completion of 10 day course of macrobid.  In the hospital her UA showed sterile pyuria, pt did not receive additional abx inpatient.  Pt has been tolerating a regular diet, and is clear from a medical standpoint. Please f/u PCP with Dr. Tyler Ellis within 1-2 weeks of discharge from [facility]. Patient stable for transfer to [facility] for further management of psychosis.

## 2020-12-14 NOTE — DISCHARGE NOTE PROVIDER - NSDCMRMEDTOKEN_GEN_ALL_CORE_FT
amLODIPine 2.5 mg oral tablet: 1 tab(s) orally once a day  busPIRone 10 mg oral tablet: 1 tab(s) orally 2 times a day  Per pharmacy, 30-day supply dispensed 7/30/2020  LORazepam 1 mg oral tablet: 1 tab(s) orally 3 times a day, As Needed    ISTOP Reference #: [id number]  Quantity of 90 tablets for a 30-day supply dispensed  08/23/2020    amLODIPine 2.5 mg oral tablet: 1 tab(s) orally once a day  melatonin 3 mg oral tablet: 2 tab(s) orally once a day (at bedtime)  OLANZapine: 1.25 milligram(s) orally once a day, As Needed for agitation  pantoprazole 40 mg oral delayed release tablet: 1 tab(s) orally once a day (before a meal)  polyethylene glycol 3350 oral powder for reconstitution: 17 gram(s) orally once a day  risperiDONE 0.5 mg oral tablet: 1 tab(s) orally once a day (at bedtime)  senna oral tablet: 2 tab(s) orally once a day (at bedtime)

## 2020-12-14 NOTE — PROGRESS NOTE ADULT - PROBLEM SELECTOR PLAN 8
- unable to pay for Housing  - f/u SW c/s - unable to pay for Housing  - f/u SW c/s  - clear from a medical standpoint. Main problem is psychiatric in nature, awaiting final psych recommendations regarding transfer to geriatric inpatient at [facility].

## 2020-12-14 NOTE — BH CONSULTATION LIAISON PROGRESS NOTE - NSBHCHARTREVIEWINVESTIGATE_PSY_A_CORE FT
12 Lead ECG (12.01.20 @ 14:30) >  Q-T Interval 408 ms  QTC Calculation(Bazett) 431 ms     CT Head No Cont (12.11.20 @ 21:19)  INTERPRETATION:  CLINICAL INDICATION: Dizziness  TECHNIQUE: Axial CT scanning of the brain was obtained from the skull base to the vertex without the administration of intravenous contrast. Coronal and sagittal reformatted images were subsequently obtained.  COMPARISON: 12/1/2020  FINDINGS:  There is no CT evidence of acute transcortical infarct.  Mild cerebral volume loss and moderate patchy white matter hypoattenuation which is nonspecific in etiology but likely related to microvascular disease.  There is no hydrocephalus, mass effect, or acute intracranial hemorrhage. No extra-axial collection. Basal cisterns are patent.  The visualized paranasal sinuses and mastoid air cells are clear.  The calvarium is intact.  IMPRESSION:  No evidence of acute transcortical infarct, acute intracranial hemorrhage, or mass effect.

## 2020-12-14 NOTE — BH CONSULTATION LIAISON PROGRESS NOTE - CASE SUMMARY
80 year old   female currently residing alone. PPH MDD/Anxiety after her husbands death 3 years ago. No hx of inpatient admissions or suicide attempts. No hx of aggression, violence, legal issues or substance abuse problems. PMH-HTN, HLD, OAB, Cholelithiasis with acute cholecystitis & Diverticulosis. BIB family referred by [facility]/MD for psychosis.  On evaluation, pt. remains psychotic endorsing paranoid thoughts, talking about though insertion and thought broadcasting. Pt. notes that someone has been drugging her at home. She notes that her children and others are going to make her suffer. She appears calm and cooperative. No overnight events and no prns were given. Pt. was started on risperidone yesterday.    Cont. current management. Patient seen and evaluated, I agree with above assessment and plan, pt. grossly oriented, overall cooperative, but guarded, reports feeling depressed for months with poor sleep, reports paranoia and delusions that her neighbors are watching her and poisoning her food. Patient also stated "they will make me suffer",  but did not provide more details. She denies SI and HI. Denies AH and VH. Recommend to continue meds. as written above, monitor EKG for qtc, pt. will need an inpatient psychiatric admission for ongoing psychosis, pt. needs further treatment and stabilization, case d/w Dr. Garcia, will follow

## 2020-12-14 NOTE — PROGRESS NOTE ADULT - PROBLEM SELECTOR PLAN 1
- Low suspicion of delirium 2/2 infectious etiology given rapid resolution of symptoms prior Abx administration  - TSH wnl  - Urine and serum toxicology negative   - Zyprexa 1.25 prn agitation, per psych  - Psych c/s: pt does not have capacity to leave AMA but does have capacity to refuse care  -  c/s - unclear etiology of psychotic episode: potentially depression w/ episode of psychosis vs dementia w/ episode of psychosis   - CT head: negative for acute findings   - TSH wnl  - Urine and serum toxicology negative   - Zyprexa 1.25 prn agitation, per psych  - c/w risperdal BID   - Psych c/s: pt does not have capacity to leave AMA or to refuse care  - likely candidate for inpatient thor psych at [facility]: spoke to daughter in law today 12/14 family is agreeable to transfer pt to inpatient thor psych. will await final psych recs   - SW c/s

## 2020-12-14 NOTE — PROGRESS NOTE ADULT - SUBJECTIVE AND OBJECTIVE BOX
**** INCOMPLETE******  PROGRESS NOTE:     CONTACT INFO:  Marlene Bell   PGY-1 Internal Medicine  [id number]    Patient is a 80y old  Female who presents with a chief complaint of "I was screaming" (13 Dec 2020 08:27)      SUBJECTIVE / OVERNIGHT EVENTS:   Patient seen and evaluated at bedside.  Pt reports dizziness and lightheadedness this AM.  She also states "I am supposed to go home, but my son will not pick me up," Denies SOB at rest, chest pain, palpitations, nausea/vomiting.      MEDICATIONS  (STANDING):  amLODIPine   Tablet 2.5 milliGRAM(s) Oral daily  heparin   Injectable 5000 Unit(s) SubCutaneous every 12 hours  influenza  Vaccine (HIGH DOSE) 0.7 milliLiter(s) IntraMuscular once  melatonin 6 milliGRAM(s) Oral at bedtime  pantoprazole    Tablet 40 milliGRAM(s) Oral before breakfast  risperiDONE   Tablet 0.25 milliGRAM(s) Oral two times a day  senna 2 Tablet(s) Oral at bedtime  sodium chloride 0.9%. 1000 milliLiter(s) (100 mL/Hr) IV Continuous <Continuous>    MEDICATIONS  (PRN):  OLANZapine 1.25 milliGRAM(s) Oral daily PRN Agitation      CAPILLARY BLOOD GLUCOSE        I&O's Summary      PHYSICAL EXAM:  Vital Signs Last 24 Hrs  T(C): 36.2 (14 Dec 2020 06:48), Max: 37.2 (13 Dec 2020 21:17)  T(F): 97.2 (14 Dec 2020 06:48), Max: 98.9 (13 Dec 2020 21:17)  HR: 78 (14 Dec 2020 06:48) (76 - 78)  BP: 142/74 (14 Dec 2020 06:48) (118/60 - 142/74)  BP(mean): --  RR: 18 (14 Dec 2020 06:48) (17 - 18)  SpO2: 100% (14 Dec 2020 06:48) (97% - 100%)    CONSTITUTIONAL: NAD, well-developed  RESPIRATORY: Normal respiratory effort; lungs are clear to auscultation bilaterally  CARDIOVASCULAR: Regular rate and rhythm, normal S1 and S2, no murmur/rub/gallop; No lower extremity edema; Peripheral pulses are 2+ bilaterally  ABDOMEN: slight suprapubic tenderness to palpation, normoactive bowel sounds, no rebound/guarding; No hepatosplenomegaly  MUSCLOSKELETAL: no clubbing or cyanosis of digits; no joint swelling or tenderness to palpation  PSYCH: A+O to person, place, and time; affect appropriate    LABS:                        11.8   6.07  )-----------( 364      ( 14 Dec 2020 07:42 )             36.0     12-14    136  |  101  |  13  ----------------------------<  101<H>  3.6   |  23  |  0.64    Ca    9.1      14 Dec 2020 07:42  Phos  3.0     12-14  Mg     2.0     12-14                  RADIOLOGY & ADDITIONAL TESTS:  Results Reviewed:   Imaging Personally Reviewed:  Electrocardiogram Personally Reviewed:    COORDINATION OF CARE:  Care Discussed with Consultants/Other Providers [Y/N]:  Prior or Outpatient Records Reviewed [Y/N]:   CONTACT INFO:  Marlene Romerojak   PGY-1 Internal Medicine  [id number]    Patient is a 80y old  Female who presents with a chief complaint of "I was screaming" (13 Dec 2020 08:27)      SUBJECTIVE / OVERNIGHT EVENTS:   Patient seen and evaluated at bedside.  Pt reports dizziness and lightheadedness this AM.  She also states "I am supposed to go home, but my son will not pick me up," Denies SOB at rest, chest pain, palpitations, nausea/vomiting.      MEDICATIONS  (STANDING):  amLODIPine   Tablet 2.5 milliGRAM(s) Oral daily  heparin   Injectable 5000 Unit(s) SubCutaneous every 12 hours  influenza  Vaccine (HIGH DOSE) 0.7 milliLiter(s) IntraMuscular once  melatonin 6 milliGRAM(s) Oral at bedtime  pantoprazole    Tablet 40 milliGRAM(s) Oral before breakfast  risperiDONE   Tablet 0.25 milliGRAM(s) Oral two times a day  senna 2 Tablet(s) Oral at bedtime  sodium chloride 0.9%. 1000 milliLiter(s) (100 mL/Hr) IV Continuous <Continuous>    MEDICATIONS  (PRN):  OLANZapine 1.25 milliGRAM(s) Oral daily PRN Agitation      CAPILLARY BLOOD GLUCOSE        I&O's Summary      PHYSICAL EXAM:  Vital Signs Last 24 Hrs  T(C): 36.2 (14 Dec 2020 06:48), Max: 37.2 (13 Dec 2020 21:17)  T(F): 97.2 (14 Dec 2020 06:48), Max: 98.9 (13 Dec 2020 21:17)  HR: 78 (14 Dec 2020 06:48) (76 - 78)  BP: 142/74 (14 Dec 2020 06:48) (118/60 - 142/74)  BP(mean): --  RR: 18 (14 Dec 2020 06:48) (17 - 18)  SpO2: 100% (14 Dec 2020 06:48) (97% - 100%)    CONSTITUTIONAL: NAD, well-developed  RESPIRATORY: Normal respiratory effort; lungs are clear to auscultation bilaterally  CARDIOVASCULAR: Regular rate and rhythm, normal S1 and S2, no murmur/rub/gallop; No lower extremity edema; Peripheral pulses are 2+ bilaterally  ABDOMEN: slight suprapubic tenderness to palpation, normoactive bowel sounds, no rebound/guarding; No hepatosplenomegaly  MUSCLOSKELETAL: no clubbing or cyanosis of digits; no joint swelling or tenderness to palpation  PSYCH: A+O to person, place, and time; affect appropriate    LABS:                        11.8   6.07  )-----------( 364      ( 14 Dec 2020 07:42 )             36.0     12-14    136  |  101  |  13  ----------------------------<  101<H>  3.6   |  23  |  0.64    Ca    9.1      14 Dec 2020 07:42  Phos  3.0     12-14  Mg     2.0     12-14                  RADIOLOGY & ADDITIONAL TESTS:  Results Reviewed:   Imaging Personally Reviewed:  Electrocardiogram Personally Reviewed:    COORDINATION OF CARE:  Care Discussed with Consultants/Other Providers [Y/N]:  Prior or Outpatient Records Reviewed [Y/N]:
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
## 2020-12-15 LAB
ANION GAP SERPL CALC-SCNC: 10 MMOL/L — SIGNIFICANT CHANGE UP (ref 7–14)
BUN SERPL-MCNC: 12 MG/DL — SIGNIFICANT CHANGE UP (ref 7–23)
CALCIUM SERPL-MCNC: 9 MG/DL — SIGNIFICANT CHANGE UP (ref 8.4–10.5)
CHLORIDE SERPL-SCNC: 103 MMOL/L — SIGNIFICANT CHANGE UP (ref 98–107)
CO2 SERPL-SCNC: 25 MMOL/L — SIGNIFICANT CHANGE UP (ref 22–31)
CREAT SERPL-MCNC: 0.74 MG/DL — SIGNIFICANT CHANGE UP (ref 0.5–1.3)
FOLATE SERPL-MCNC: 14.1 NG/ML — SIGNIFICANT CHANGE UP (ref 3.1–17.5)
GLUCOSE SERPL-MCNC: 93 MG/DL — SIGNIFICANT CHANGE UP (ref 70–99)
HCT VFR BLD CALC: 32.7 % — LOW (ref 34.5–45)
HGB BLD-MCNC: 10.7 G/DL — LOW (ref 11.5–15.5)
MCHC RBC-ENTMCNC: 29.6 PG — SIGNIFICANT CHANGE UP (ref 27–34)
MCHC RBC-ENTMCNC: 32.7 GM/DL — SIGNIFICANT CHANGE UP (ref 32–36)
MCV RBC AUTO: 90.3 FL — SIGNIFICANT CHANGE UP (ref 80–100)
NRBC # BLD: 0 /100 WBCS — SIGNIFICANT CHANGE UP
NRBC # FLD: 0 K/UL — SIGNIFICANT CHANGE UP
PLATELET # BLD AUTO: 337 K/UL — SIGNIFICANT CHANGE UP (ref 150–400)
POTASSIUM SERPL-MCNC: 3.5 MMOL/L — SIGNIFICANT CHANGE UP (ref 3.5–5.3)
POTASSIUM SERPL-SCNC: 3.5 MMOL/L — SIGNIFICANT CHANGE UP (ref 3.5–5.3)
RBC # BLD: 3.62 M/UL — LOW (ref 3.8–5.2)
RBC # FLD: 13.2 % — SIGNIFICANT CHANGE UP (ref 10.3–14.5)
SODIUM SERPL-SCNC: 138 MMOL/L — SIGNIFICANT CHANGE UP (ref 135–145)
VIT B12 SERPL-MCNC: 1508 PG/ML — HIGH (ref 200–900)
WBC # BLD: 4.93 K/UL — SIGNIFICANT CHANGE UP (ref 3.8–10.5)
WBC # FLD AUTO: 4.93 K/UL — SIGNIFICANT CHANGE UP (ref 3.8–10.5)

## 2020-12-15 PROCEDURE — 99233 SBSQ HOSP IP/OBS HIGH 50: CPT | Mod: GC

## 2020-12-15 PROCEDURE — 99233 SBSQ HOSP IP/OBS HIGH 50: CPT

## 2020-12-15 RX ORDER — RISPERIDONE 4 MG/1
0.5 TABLET ORAL AT BEDTIME
Refills: 0 | Status: DISCONTINUED | OUTPATIENT
Start: 2020-12-15 | End: 2020-12-16

## 2020-12-15 RX ORDER — RISPERIDONE 4 MG/1
0.25 TABLET ORAL
Refills: 0 | Status: DISCONTINUED | OUTPATIENT
Start: 2020-12-15 | End: 2020-12-16

## 2020-12-15 RX ORDER — RISPERIDONE 4 MG/1
1 TABLET ORAL
Qty: 0 | Refills: 0 | DISCHARGE
Start: 2020-12-15

## 2020-12-15 RX ORDER — SENNA PLUS 8.6 MG/1
2 TABLET ORAL
Qty: 0 | Refills: 0 | DISCHARGE
Start: 2020-12-15

## 2020-12-15 RX ORDER — LANOLIN ALCOHOL/MO/W.PET/CERES
2 CREAM (GRAM) TOPICAL
Qty: 0 | Refills: 0 | DISCHARGE
Start: 2020-12-15

## 2020-12-15 RX ORDER — PANTOPRAZOLE SODIUM 20 MG/1
1 TABLET, DELAYED RELEASE ORAL
Qty: 0 | Refills: 0 | DISCHARGE
Start: 2020-12-15

## 2020-12-15 RX ORDER — OLANZAPINE 15 MG/1
1.25 TABLET, FILM COATED ORAL
Qty: 0 | Refills: 0 | DISCHARGE
Start: 2020-12-15

## 2020-12-15 RX ADMIN — HEPARIN SODIUM 5000 UNIT(S): 5000 INJECTION INTRAVENOUS; SUBCUTANEOUS at 17:12

## 2020-12-15 RX ADMIN — SENNA PLUS 2 TABLET(S): 8.6 TABLET ORAL at 21:49

## 2020-12-15 RX ADMIN — HEPARIN SODIUM 5000 UNIT(S): 5000 INJECTION INTRAVENOUS; SUBCUTANEOUS at 05:56

## 2020-12-15 RX ADMIN — Medication 6 MILLIGRAM(S): at 21:49

## 2020-12-15 RX ADMIN — RISPERIDONE 0.25 MILLIGRAM(S): 4 TABLET ORAL at 11:42

## 2020-12-15 RX ADMIN — RISPERIDONE 0.5 MILLIGRAM(S): 4 TABLET ORAL at 22:37

## 2020-12-15 RX ADMIN — PANTOPRAZOLE SODIUM 40 MILLIGRAM(S): 20 TABLET, DELAYED RELEASE ORAL at 05:56

## 2020-12-15 RX ADMIN — AMLODIPINE BESYLATE 2.5 MILLIGRAM(S): 2.5 TABLET ORAL at 05:56

## 2020-12-15 NOTE — BH CONSULTATION LIAISON PROGRESS NOTE - NSBHFUPINTERVALHXFT_PSY_A_CORE
Patient seen and evaluated at bedside. Did not get PRNs overnight.  Patient is AAOx3, cooperative, was able to sleep, did not eat breakfast yet because she was “not hungry right now,” though she stated she ate last night. She reported feeling safe in the hospital but still believes someone was trying to poison her.  Denies AH/VH, SI/HI.      Collateral obtained from patient’s son, Khoi ([phone]) (pt. gave consent):  As per patient’s son, the patient has been declining for the past two months – she has not been eating, not sleeping, depressed and has been paranoid about her food and that her neighbors have been watching her and trying to poison her. He stated that pt. does not even trust her sons and has been paranoid about them as well. He also mentioned last week, when patient was getting her hair dye at the salon,  patient believed she was being attacked and started screaming and punching herself.  The son reached out to a [facility] doc., Dr. Villa, who was concerned about her psychotic episode and recommended he bring her to the hospital. Son is extremely concerned about pt.'s safety and he is amenable for the patient’s inpatient admission at [facility].

## 2020-12-15 NOTE — PROGRESS NOTE ADULT - PROBLEM SELECTOR PLAN 1
- unclear etiology of psychotic episode: potentially depression w/ episode of psychosis vs dementia w/ episode of psychosis   - CT head: negative for acute findings   - TSH wnl  - Urine and serum toxicology negative   - Zyprexa 1.25 prn agitation, per psych  - c/w risperdal BID   - Psych c/s: pt does not have capacity to leave AMA or to refuse care  - likely candidate for inpatient thor psych at [facility]: spoke to daughter in law today 12/14 family is agreeable to transfer pt to inpatient thor psych. will await final psych recs   - SW c/s - unclear etiology of psychotic episode: potentially depression w/ episode of psychosis vs dementia w/ episode of psychosis   - CT head: negative for acute findings   - TSH wnl  - Urine and serum toxicology negative   - Zyprexa 1.25 prn agitation, per psych  - c/w risperdal 0.25 in morning and .5 at bedtime  - Psych c/s: pt does not have capacity to leave AMA or to refuse care  - likely candidate for inpatient thor psych at [facility]: family is agreeable to transfer pt to inpatient thor psych  - pending available bed at [facility]   - SW c/s - unclear etiology of psychotic episode: potentially depression w/ episode of psychosis vs dementia w/ episode of psychosis   - CT head: negative for acute findings   - TSH wnl  - Urine and serum toxicology negative   - Zyprexa 1.25 prn agitation, per psych  - c/w risperdal 0.25 in morning and .5 at bedtime  - Psych c/s: pt does not have capacity to leave AMA or to refuse care  - likely candidate for inpatient thor psych at [facility]: family is agreeable to transfer pt to inpatient thor psych  - pending available bed at [facility]   - SW c/s  - most recent QTC on 12/11: 465

## 2020-12-15 NOTE — PROGRESS NOTE ADULT - PROBLEM SELECTOR PLAN 8
- unable to pay for Housing  - f/u SW c/s  - clear from a medical standpoint. Main problem is psychiatric in nature, awaiting final psych recommendations regarding transfer to geriatric inpatient at [facility].

## 2020-12-15 NOTE — BH CONSULTATION LIAISON PROGRESS NOTE - NSBHASSESSMENTFT_PSY_ALL_CORE
80 year old   female currently residing alone. PPH MDD/Anxiety after her husbands death 3 years ago. No hx of inpatient admissions or suicide attempts. No hx of aggression, violence, legal issues or substance abuse problems. PMH-HTN, HLD, OAB, Cholelithiasis with acute cholecystitis & Diverticulosis. BIB family referred by [facility]/MD for psychosis. Patient presented to the ED in the context of psychosis. Patient presents with paranoid delusions  (feels that her neighbors are mixing something in her food to make her uncomfortable constipated. She  reports her reasons for not eating as the blockage in her lower abdomen. She has endorsed suicidal ideation to son (denies active SI/plan/intent) as well as to CL team, and depression related to her paranoid delusions. Patient denies active SI/plan/intent. Patient not caring for self- losing weight, decrease in ADLs. She is not at baseline with poor insight. She presents at imminent risk to self and continues to require inpatient admission for safety and stabilization.     Patient's paranoia  seems to be going on for a year. She has h/o fall because of generalized weakness caused by  poor oral intake , this time she is presenting with high WBC. She is  medically admitted  for cystitis and will  be continued  to be followed by  psychiatry.     12/13: Slept well overnight, no PRNs required. Patient calm and cooperative with interview. States she has done bad things and that "they are going to make me suffer". She does not know who she means by "they". She does not feel safe in the hospital. Denies AH, thought withdrawal, but endorses vague paranoid and persecutory delusions, referential delusions (tv and signs/books sending her messages), thought insertion and thought broadcasting. States she is always very sad, anxious. C/o passive SI, denies active SI or HI. AAOx3.    12/14: Did not sleep well o/n. Patient guarded, repeating multiple times that she wants to go home. Reports paranoia and delusions that her neighbors are watching her and poisoning her food. Patient minimally able to reality check. Given the time frame of her symptoms, DDx includes MDD with psychosis vs. primary psychosis, with delirium potentially worsening recent symptoms. R/o underlying cognitive impairment.    12/15; Pt. seen, cooperative, but still guarded, pt. remains acute psychotic and delusional. Denies AH and VH, denies SI and HI. Son is extremely concerned about pt.'s safety and he is amenable for the patient’s inpatient admission at [facility].     PLAN:  1. Increase Risperdal 0.25mg PO qam and 0.5mg po qhs for ongoing psychosis if qtc <500  2. Continue Zyprexa 1.25mg PO/IM PRN for agitation if qtc <500  3. Patient will require psychiatric inpatient admission after medical clearance as she is not able to care for herself at home and therefore presents a danger to herself due to severity of psychotic symptoms.   4- 2 PC completed, placed in the chart.  5- Pt. cannot leave AMA.    Case d/w Dr. Garcia    Will follow

## 2020-12-15 NOTE — PROGRESS NOTE ADULT - SUBJECTIVE AND OBJECTIVE BOX
**** INCOMPLETE******  PROGRESS NOTE:     CONTACT INFO:  Marlene Bell   PGY-1 Internal Medicine  [id number]    Patient is a 80y old  Female who presents with a chief complaint of "I was screaming" (14 Dec 2020 16:58)      SUBJECTIVE / OVERNIGHT EVENTS:   Patient seen and evaluated at bedside. No fever/chills.  Denies SOB at rest, chest pain, palpitations, abdominal pain, nausea/vomiting      MEDICATIONS  (STANDING):  amLODIPine   Tablet 2.5 milliGRAM(s) Oral daily  heparin   Injectable 5000 Unit(s) SubCutaneous every 12 hours  influenza  Vaccine (HIGH DOSE) 0.7 milliLiter(s) IntraMuscular once  melatonin 6 milliGRAM(s) Oral at bedtime  pantoprazole    Tablet 40 milliGRAM(s) Oral before breakfast  risperiDONE   Tablet 0.25 milliGRAM(s) Oral two times a day  senna 2 Tablet(s) Oral at bedtime    MEDICATIONS  (PRN):  OLANZapine 1.25 milliGRAM(s) Oral daily PRN Agitation      CAPILLARY BLOOD GLUCOSE        I&O's Summary      PHYSICAL EXAM:  Vital Signs Last 24 Hrs  T(C): 36.6 (15 Dec 2020 05:54), Max: 36.8 (14 Dec 2020 21:52)  T(F): 97.8 (15 Dec 2020 05:54), Max: 98.2 (14 Dec 2020 21:52)  HR: 64 (15 Dec 2020 05:54) (64 - 85)  BP: 130/61 (15 Dec 2020 05:54) (130/61 - 153/71)  BP(mean): --  RR: 16 (15 Dec 2020 05:54) (16 - 18)  SpO2: 98% (15 Dec 2020 05:54) (97% - 98%)    CONSTITUTIONAL: NAD, well-developed  RESPIRATORY: Normal respiratory effort; lungs are clear to auscultation bilaterally  CARDIOVASCULAR: Regular rate and rhythm, normal S1 and S2, no murmur/rub/gallop; No lower extremity edema; Peripheral pulses are 2+ bilaterally  ABDOMEN: Nontender to palpation, normoactive bowel sounds, no rebound/guarding; No hepatosplenomegaly  MUSCLOSKELETAL: no clubbing or cyanosis of digits; no joint swelling or tenderness to palpation  PSYCH: A+O to person, place, and time; affect appropriate    LABS:                        10.7   4.93  )-----------( 337      ( 15 Dec 2020 07:57 )             32.7     12-15    138  |  103  |  12  ----------------------------<  93  3.5   |  25  |  0.74    Ca    9.0      15 Dec 2020 07:57  Phos  3.0     12-14  Mg     2.0     12-14                  RADIOLOGY & ADDITIONAL TESTS:  Results Reviewed:   Imaging Personally Reviewed:  Electrocardiogram Personally Reviewed:    COORDINATION OF CARE:  Care Discussed with Consultants/Other Providers [Y/N]:  Prior or Outpatient Records Reviewed [Y/N]:   CONTACT INFO:  Marlene Romerojak   PGY-1 Internal Medicine  [id number]    Patient is a 80y old  Female who presents with a chief complaint of "I was screaming" (14 Dec 2020 16:58)      SUBJECTIVE / OVERNIGHT EVENTS:   Patient seen and evaluated at bedside. Pt lethargic this morning, sleeping comfortably in bed. Denies SOB at rest, chest pain, palpitations, abdominal pain, nausea/vomiting      MEDICATIONS  (STANDING):  amLODIPine   Tablet 2.5 milliGRAM(s) Oral daily  heparin   Injectable 5000 Unit(s) SubCutaneous every 12 hours  influenza  Vaccine (HIGH DOSE) 0.7 milliLiter(s) IntraMuscular once  melatonin 6 milliGRAM(s) Oral at bedtime  pantoprazole    Tablet 40 milliGRAM(s) Oral before breakfast  risperiDONE   Tablet 0.25 milliGRAM(s) Oral two times a day  senna 2 Tablet(s) Oral at bedtime    MEDICATIONS  (PRN):  OLANZapine 1.25 milliGRAM(s) Oral daily PRN Agitation      CAPILLARY BLOOD GLUCOSE        I&O's Summary      PHYSICAL EXAM:  Vital Signs Last 24 Hrs  T(C): 36.6 (15 Dec 2020 05:54), Max: 36.8 (14 Dec 2020 21:52)  T(F): 97.8 (15 Dec 2020 05:54), Max: 98.2 (14 Dec 2020 21:52)  HR: 64 (15 Dec 2020 05:54) (64 - 85)  BP: 130/61 (15 Dec 2020 05:54) (130/61 - 153/71)  BP(mean): --  RR: 16 (15 Dec 2020 05:54) (16 - 18)  SpO2: 98% (15 Dec 2020 05:54) (97% - 98%)    CONSTITUTIONAL: NAD, thin  RESPIRATORY: Normal respiratory effort; lungs are clear to auscultation bilaterally  CARDIOVASCULAR: Regular rate and rhythm, normal S1 and S2, no murmur/rub/gallop; No lower extremity edema; Peripheral pulses are 2+ bilaterally  ABDOMEN: Nontender to palpation, normoactive bowel sounds, no rebound/guarding; No hepatosplenomegaly  MUSCLOSKELETAL: no clubbing or cyanosis of digits; no joint swelling or tenderness to palpation  PSYCH: A+O to person, place, and time; affect appropriate    LABS:                        10.7   4.93  )-----------( 337      ( 15 Dec 2020 07:57 )             32.7     12-15    138  |  103  |  12  ----------------------------<  93  3.5   |  25  |  0.74    Ca    9.0      15 Dec 2020 07:57  Phos  3.0     12-14  Mg     2.0     12-14                  RADIOLOGY & ADDITIONAL TESTS:  Results Reviewed:   Imaging Personally Reviewed:  Electrocardiogram Personally Reviewed:    COORDINATION OF CARE:  Care Discussed with Consultants/Other Providers [Y/N]:  Prior or Outpatient Records Reviewed [Y/N]:
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
## 2020-12-16 ENCOUNTER — TRANSCRIPTION ENCOUNTER (OUTPATIENT)
Age: 80
End: 2020-12-16

## 2020-12-16 ENCOUNTER — INPATIENT (INPATIENT)
Facility: HOSPITAL | Age: 80
LOS: 29 days | Discharge: HOME | End: 2021-01-15
Attending: PSYCHIATRY & NEUROLOGY | Admitting: PSYCHIATRY & NEUROLOGY
Payer: MEDICARE

## 2020-12-16 VITALS — TEMPERATURE: 98 F | WEIGHT: 110.01 LBS | OXYGEN SATURATION: 99 % | RESPIRATION RATE: 18 BRPM | HEIGHT: 61 IN

## 2020-12-16 VITALS
RESPIRATION RATE: 17 BRPM | SYSTOLIC BLOOD PRESSURE: 131 MMHG | DIASTOLIC BLOOD PRESSURE: 62 MMHG | TEMPERATURE: 97 F | HEART RATE: 82 BPM | OXYGEN SATURATION: 98 %

## 2020-12-16 DIAGNOSIS — F33.2 MAJOR DEPRESSIVE DISORDER, RECURRENT SEVERE WITHOUT PSYCHOTIC FEATURES: ICD-10-CM

## 2020-12-16 DIAGNOSIS — Z90.710 ACQUIRED ABSENCE OF BOTH CERVIX AND UTERUS: Chronic | ICD-10-CM

## 2020-12-16 PROCEDURE — 99233 SBSQ HOSP IP/OBS HIGH 50: CPT

## 2020-12-16 PROCEDURE — 99239 HOSP IP/OBS DSCHRG MGMT >30: CPT

## 2020-12-16 RX ORDER — POLYETHYLENE GLYCOL 3350 17 G/17G
17 POWDER, FOR SOLUTION ORAL DAILY
Refills: 0 | Status: DISCONTINUED | OUTPATIENT
Start: 2020-12-16 | End: 2020-12-16

## 2020-12-16 RX ORDER — POLYETHYLENE GLYCOL 3350 17 G/17G
17 POWDER, FOR SOLUTION ORAL DAILY
Refills: 0 | Status: DISCONTINUED | OUTPATIENT
Start: 2020-12-16 | End: 2021-01-14

## 2020-12-16 RX ORDER — INFLUENZA VIRUS VACCINE 15; 15; 15; 15 UG/.5ML; UG/.5ML; UG/.5ML; UG/.5ML
0.5 SUSPENSION INTRAMUSCULAR ONCE
Refills: 0 | Status: DISCONTINUED | OUTPATIENT
Start: 2020-12-16 | End: 2020-12-16

## 2020-12-16 RX ORDER — OLANZAPINE 15 MG/1
1.25 TABLET, FILM COATED ORAL
Qty: 0 | Refills: 0 | DISCHARGE
Start: 2020-12-16

## 2020-12-16 RX ORDER — ACETAMINOPHEN 500 MG
650 TABLET ORAL EVERY 6 HOURS
Refills: 0 | Status: DISCONTINUED | OUTPATIENT
Start: 2020-12-16 | End: 2021-01-15

## 2020-12-16 RX ORDER — SENNA PLUS 8.6 MG/1
2 TABLET ORAL
Qty: 0 | Refills: 0 | DISCHARGE
Start: 2020-12-16

## 2020-12-16 RX ORDER — OLANZAPINE 15 MG/1
1.25 TABLET, FILM COATED ORAL EVERY 24 HOURS
Refills: 0 | Status: DISCONTINUED | OUTPATIENT
Start: 2020-12-16 | End: 2020-12-18

## 2020-12-16 RX ORDER — OLANZAPINE 15 MG/1
1.25 TABLET, FILM COATED ORAL ONCE
Refills: 0 | Status: DISCONTINUED | OUTPATIENT
Start: 2020-12-16 | End: 2021-01-15

## 2020-12-16 RX ORDER — SENNA PLUS 8.6 MG/1
2 TABLET ORAL AT BEDTIME
Refills: 0 | Status: DISCONTINUED | OUTPATIENT
Start: 2020-12-16 | End: 2020-12-21

## 2020-12-16 RX ORDER — AMLODIPINE BESYLATE 2.5 MG/1
2.5 TABLET ORAL DAILY
Refills: 0 | Status: DISCONTINUED | OUTPATIENT
Start: 2020-12-16 | End: 2020-12-17

## 2020-12-16 RX ORDER — LANOLIN ALCOHOL/MO/W.PET/CERES
6 CREAM (GRAM) TOPICAL AT BEDTIME
Refills: 0 | Status: DISCONTINUED | OUTPATIENT
Start: 2020-12-16 | End: 2020-12-18

## 2020-12-16 RX ORDER — PANTOPRAZOLE SODIUM 20 MG/1
40 TABLET, DELAYED RELEASE ORAL
Refills: 0 | Status: DISCONTINUED | OUTPATIENT
Start: 2020-12-16 | End: 2021-01-14

## 2020-12-16 RX ORDER — RISPERIDONE 4 MG/1
0.5 TABLET ORAL AT BEDTIME
Refills: 0 | Status: DISCONTINUED | OUTPATIENT
Start: 2020-12-16 | End: 2020-12-17

## 2020-12-16 RX ORDER — POLYETHYLENE GLYCOL 3350 17 G/17G
17 POWDER, FOR SOLUTION ORAL
Qty: 0 | Refills: 0 | DISCHARGE
Start: 2020-12-16

## 2020-12-16 RX ORDER — RISPERIDONE 4 MG/1
1 TABLET ORAL
Qty: 0 | Refills: 0 | DISCHARGE
Start: 2020-12-16

## 2020-12-16 RX ORDER — INFLUENZA VIRUS VACCINE 15; 15; 15; 15 UG/.5ML; UG/.5ML; UG/.5ML; UG/.5ML
0.7 SUSPENSION INTRAMUSCULAR ONCE
Refills: 0 | Status: COMPLETED | OUTPATIENT
Start: 2020-12-16 | End: 2020-12-16

## 2020-12-16 RX ORDER — PANTOPRAZOLE SODIUM 20 MG/1
1 TABLET, DELAYED RELEASE ORAL
Qty: 0 | Refills: 0 | DISCHARGE
Start: 2020-12-16

## 2020-12-16 RX ADMIN — PANTOPRAZOLE SODIUM 40 MILLIGRAM(S): 20 TABLET, DELAYED RELEASE ORAL at 05:21

## 2020-12-16 RX ADMIN — SENNA PLUS 2 TABLET(S): 8.6 TABLET ORAL at 21:40

## 2020-12-16 RX ADMIN — RISPERIDONE 0.25 MILLIGRAM(S): 4 TABLET ORAL at 05:21

## 2020-12-16 RX ADMIN — AMLODIPINE BESYLATE 2.5 MILLIGRAM(S): 2.5 TABLET ORAL at 05:21

## 2020-12-16 RX ADMIN — Medication 6 MILLIGRAM(S): at 21:40

## 2020-12-16 RX ADMIN — POLYETHYLENE GLYCOL 3350 17 GRAM(S): 17 POWDER, FOR SOLUTION ORAL at 12:46

## 2020-12-16 RX ADMIN — HEPARIN SODIUM 5000 UNIT(S): 5000 INJECTION INTRAVENOUS; SUBCUTANEOUS at 05:22

## 2020-12-16 RX ADMIN — RISPERIDONE 0.5 MILLIGRAM(S): 4 TABLET ORAL at 21:40

## 2020-12-16 NOTE — BH CONSULTATION LIAISON PROGRESS NOTE - CURRENT MEDICATION
MEDICATIONS  (STANDING):  amLODIPine   Tablet 2.5 milliGRAM(s) Oral daily  heparin   Injectable 5000 Unit(s) SubCutaneous every 12 hours  influenza  Vaccine (HIGH DOSE) 0.7 milliLiter(s) IntraMuscular once  melatonin 6 milliGRAM(s) Oral at bedtime  pantoprazole    Tablet 40 milliGRAM(s) Oral before breakfast  polyethylene glycol 3350 17 Gram(s) Oral daily  risperiDONE   Tablet 0.25 milliGRAM(s) Oral <User Schedule>  risperiDONE   Tablet 0.5 milliGRAM(s) Oral at bedtime  senna 2 Tablet(s) Oral at bedtime    MEDICATIONS  (PRN):  OLANZapine 1.25 milliGRAM(s) Oral daily PRN Agitation  
MEDICATIONS  (STANDING):  amLODIPine   Tablet 2.5 milliGRAM(s) Oral daily  heparin   Injectable 5000 Unit(s) SubCutaneous every 12 hours  influenza  Vaccine (HIGH DOSE) 0.7 milliLiter(s) IntraMuscular once  melatonin 6 milliGRAM(s) Oral at bedtime  pantoprazole    Tablet 40 milliGRAM(s) Oral before breakfast  risperiDONE   Tablet 0.25 milliGRAM(s) Oral <User Schedule>  risperiDONE   Tablet 0.5 milliGRAM(s) Oral at bedtime  senna 2 Tablet(s) Oral at bedtime    MEDICATIONS  (PRN):  OLANZapine 1.25 milliGRAM(s) Oral daily PRN Agitation  
MEDICATIONS  (STANDING):  amLODIPine   Tablet 2.5 milliGRAM(s) Oral daily  heparin   Injectable 5000 Unit(s) SubCutaneous every 12 hours  influenza  Vaccine (HIGH DOSE) 0.7 milliLiter(s) IntraMuscular once  melatonin 6 milliGRAM(s) Oral at bedtime  pantoprazole    Tablet 40 milliGRAM(s) Oral before breakfast  risperiDONE   Tablet 0.25 milliGRAM(s) Oral two times a day  senna 2 Tablet(s) Oral at bedtime  sodium chloride 0.9%. 1000 milliLiter(s) (100 mL/Hr) IV Continuous <Continuous>    MEDICATIONS  (PRN):  OLANZapine 1.25 milliGRAM(s) Oral daily PRN Agitation  
MEDICATIONS  (STANDING):  amLODIPine   Tablet 2.5 milliGRAM(s) Oral daily  heparin   Injectable 5000 Unit(s) SubCutaneous every 12 hours  influenza  Vaccine (HIGH DOSE) 0.7 milliLiter(s) IntraMuscular once  melatonin 6 milliGRAM(s) Oral at bedtime  mineral oil 30 milliLiter(s) Oral once  pantoprazole    Tablet 40 milliGRAM(s) Oral before breakfast  risperiDONE   Tablet 0.25 milliGRAM(s) Oral two times a day  senna 2 Tablet(s) Oral at bedtime  sodium chloride 0.9%. 1000 milliLiter(s) (100 mL/Hr) IV Continuous <Continuous>    MEDICATIONS  (PRN):  OLANZapine 1.25 milliGRAM(s) Oral daily PRN Agitation  
MEDICATIONS  (STANDING):  amLODIPine   Tablet 2.5 milliGRAM(s) Oral daily  heparin   Injectable 5000 Unit(s) SubCutaneous every 12 hours  influenza  Vaccine (HIGH DOSE) 0.7 milliLiter(s) IntraMuscular once  melatonin 6 milliGRAM(s) Oral at bedtime  pantoprazole    Tablet 40 milliGRAM(s) Oral before breakfast  risperiDONE   Tablet 0.25 milliGRAM(s) Oral two times a day  senna 2 Tablet(s) Oral at bedtime  sodium chloride 0.9%. 1000 milliLiter(s) (100 mL/Hr) IV Continuous <Continuous>    MEDICATIONS  (PRN):  OLANZapine 1.25 milliGRAM(s) Oral daily PRN Agitation

## 2020-12-16 NOTE — BH CONSULTATION LIAISON PROGRESS NOTE - NSBHMSESPEECH_PSY_A_CORE
Abnormal as indicated, otherwise normal...
Abnormal as indicated, otherwise normal...
Normal volume, rate, productivity, spontaneity and articulation

## 2020-12-16 NOTE — BH INPATIENT PSYCHIATRY ASSESSMENT NOTE - NSBHASSESSSUMMFT_PSY_ALL_CORE
This is a is 80 year old  female, , domiciled alone, w/ PPH MDD/Anxiety which started after her ’s death 3 years ago, not in outpt ttx, no hx of inpt psych admissions or suicide attempts, no hx of aggression, violence, legal issues or substance abuse problems, w/ PMH ofHTN, HLD, OAB, Cholelithiasis with acute cholecystitis & Diverticulosis. Patient presented to [facility] on 12/11 with psychosis, admitted to medicine for cystitis and seen by  psychiatry and found to have paranoid delusions of being poisoned resulting in depression, passive SI, and functional impairment of ADLs. Patient now being admitted 2PC to [facility] 2S for management of psychosis. Upon presentation pt continuing to demonstrate impaired reality testing with depressed mood.  Current presentation consistent with MDD with psychosis.  Pt warrants involuntary psychiatric hospitalization for symptom stabilization.      Plan:  1) Risperdal 0.5mg qHS, melatonin 6mg qHS,  PRN olanzapine 1.25mg IM/PO  2) Medical- amlodipine 2.5mg, pantoprazole 40mg, senna, Miralax  3) No substance abuse concerns  4) No need for CO, proceed with routine checks  5) Pt will benefit from structured [facility] environment

## 2020-12-16 NOTE — BH INPATIENT PSYCHIATRY ASSESSMENT NOTE - HPI (INCLUDE ILLNESS QUALITY, SEVERITY, DURATION, TIMING, CONTEXT, MODIFYING FACTORS, ASSOCIATED SIGNS AND SYMPTOMS)
This is a is 80 year old  female, , domiciled alone, w/ PPH MDD/Anxiety which started after her ’s death 3 years ago, not in outpt ttx, no hx of inpt psych admissions or suicide attempts, no hx of aggression, violence, legal issues or substance abuse problems, w/ PMH ofHTN, HLD, OAB, Cholelithiasis with acute cholecystitis & Diverticulosis. Patient presented to [facility] on 12/11 with psychosis, admitted to medicine for cystitis and seen by  psychiatry and found to have paranoid delusions of being poisoned resulting in depression, passive SI, and functional impairment of ADLs. Patient now being admitted 2PC to [facility] 2S for management of psychosis.     Patient seen on floor. History limited as patient is confused and is poor historian. On interview, pt AAOx2 (person and time, but says place is “a hospital for illegal people”). Patient reports she is here because she was depressed. Patient asks writer, “where will I go from here?” and states her main concern is “I don’t have a place to live anymore.”  Endorses feeling depressed, poor sleep, and passive SI of not wanting to live, denies I/I/P, HI, thoughts of self-harm. She also endorses feeling confused. She repeatedly asks writer admits to feeling unsafe on unit, stating “I’m afraid of falling, afraid of getting sick.” Pt states she suspects none of the patients on the units were given beds. Provided writer contact for her sons, stating “do you think their numbers are the same if I use these phones?”        HPI Per [facility] ED Behavioral Health Assessment Note, authored on 12/11/20:    " HPI  Reason for referral: Psychosis  CC: “I got upset”    Patient is 80 year old   female currently residing alone. PPH MDD/Anxiety after her husbands death 3 years ago. No hx of inpatient admissions or suicide attempts. No hx of aggression, violence, legal issues or substance abuse problems. PMH-HTN, HLD, OAB, Cholelithiasis with acute cholecystitis & Diverticulosis. Patient admitted to [facility] ED on 12/16 for new onset psychosis.     Patient reports she came to the ED because her son made her come. She reports last night she got upset and her son said she was "psychotic." Patient reports she stayed with her son last night and she got angry and was yelling and screaming. She reports she was "letting out steam," because she is frustrated and worried over finances. She stated she is at risk to lose her condo and has to figure out where to live, what to do with furniture and has no money to buy food. She stated she cannot sleep (chronic issue) and so has not been driving. She stated her sons have not been helping her solve this problem with her belongings and home and so she screamed because she was upset.    Patient reports she told them "I wish I was dead." She stated she says this sometimes out frustration and "I want their attention so they'll help me." She admits to pounding on her legs because she was angry but denies intent to harm self. She endorses other passive suicidal thoughts stating there is no point and she doesn't do anything with her life. She denies active SI/intent/plan    She stated she collects social security and normally gets $1700. End of October she started charging things and then got a letter from the bank stating they were going online vs. paper statements. She reports she closed her savings account due to fear of her money being taken and believes it was taken because she did not pay all the money on the charge card. Patient reports now she thinks she will lose her condo because she can't afford it due to her lack of money. She reports not eating due to trying to save money "so I can stay in my condo."  Then patient recanted stating she is not eating because they poison her food and put yellow specks in it which cause constipation. She thinks this is all related to the bank. Patient reports she also thinks her cell phone and home are tapped. She stated her downstairs neighbors are some how involved and are monitoring her. She refused to provide information regarding the extent or reason for their involvement stating "I should really just tell the authorities." She reports not showering daily because she is afraid she'll fall and not changing her clothes daily because "I'm comfortable."     Patient reports feeling sad/depressed over her finances. She endorses chronic issues with sleep and poor appetite. She endorses hopelessness about her living situation and anxiety.     Patient denies any hallucinations & denies thought insertion/withdrawal. Patient does not report nor exhibit any signs of familia, including irritable or elevated mood, grandiosity, pressured speech, risk-taking behaviors, increase in productivity or agitation. Patient adamantly denies SI, intent or plan; denies any HI, violent thoughts.     Spoke with NOLA Gabriel ([phone])- Patient was referred by MD Dr. Villa. Patient was referred because 1-2 months ago there was an abrupt change in her behavior. Patient's son said patient thought she would hurt herself. She is delusional and paranoid. Patient thinks she is being poisoned and there are yellow specks in her food and they keep her from moving her bowels. Patient is self disimpacting herself because she doesn't think she can go. She thinks she is being watched. She states the police are coming to take her away due to bad things she has done in her past but refuses to state what was done. Until a few months ago the patient was driving and living independently. She has no hx of drug or alcohol use. She has chronic insomnia issues and has had multiple prescription and OTC medication. She has developed some stuttering in the last few months. MMSE and she scored 27/30. Patient has "abrupt onset psychosis." They tried to send [facility] at [facility] but they were closed. Daughter in law called this AM stating patient had several outbursts last night and seemed worse so they directed her to [facility].  "

## 2020-12-16 NOTE — BH CONSULTATION LIAISON PROGRESS NOTE - NSBHCONSFOLLOWNEEDS_PSY_ALL_CORE
Needs further psych safety assessment prior to discharge

## 2020-12-16 NOTE — BH PATIENT PROFILE - NS PRO AD PATIENT TYPE
Pt's son, Altafevangelista Bautista [phone] is co-agent on pt's healthcare proxy, as per makenzie Westfall./Health Care Proxy (HCP)

## 2020-12-16 NOTE — PROGRESS NOTE ADULT - ATTENDING COMMENTS
Patient seen and examined, chart and labs reviewed. Case discussed with house staff.     80F with hx HTN, Anxiety/Depression, a/w episode of psychosis. Etiology is unclear this may be MDD w/ psychosis vs. dementia with psychosis. Psych recs appreciated. D/w Dr. Olvera recommends increase risperdal to 0.25mg in AM and 0.5mg qHS. Patient will need inpatient psych admission for stabilization. Dispo planning for [facility] once bed is available.   - Ucx w/ > 3 organisms, monitor off antibiotics   - CT head with w/ volume loss and possibly microvascular disease  - B12, folate, TSH wnl.    Family in agreement with d/c to inpatient psych for stabilization. Patient is medically stable for d/c when bed is available.     32 minutes spent on d/c planning
80 W h/o HTN, Anxiety/Depression, a/w brief episode of psychosis; found to have pyuria on UA but no bacteriuria. D/c'd abx. Started risperidone per psych. F/u further psych recs.
Patient seen and examined, chart and labs reviewed. Case discussed with house staff.     80F with hx HTN, Anxiety/Depression, a/w episode of psychosis. Etiology is unclear this may be MDD w/ psychosis vs. dementia with psychosis. Psych recs appreciated. D/w Dr. Olvera previously, patient will need inpatient psych admission for stabilization. Dispo planning for [facility] once bed is available.   - Ucx w/ > 3 organisms, monitor off antibiotics   - CT head with w/ volume loss and possibly microvascular disease  - B12, folate, TSH wnl.    Family in agreement with d/c to inpatient psych for stabilization. Patient is medically stable for d/c when bed is available.     32 minutes spent on d/c planning
Patient seen and examined, chart and labs reviewed. Case discussed with house staff.     80F with hx HTN, Anxiety/Depression, a/w episode of psychosis. Etiology is unclear this may be MDD w/ psychosis vs. dementia with psychosis. Psych recs appreciated - started on Risperdal. D/w Dr. Olvera, patient still acutely psychotic, will need inpatient psych admission for stabilization.   - Ucx w/ > 3 organisms, monitor off antibiotics   - CT head with w/ volume loss and possibly microvascular disease  - Check b12, folate. TSH wnl.
80 W h/o HTN, Anxiety/Depression, a/w brief episode of psychosis; found to have pyuria on UA but no bacteriuria. D/c abx. F/u further psych recs.

## 2020-12-16 NOTE — BH CONSULTATION LIAISON PROGRESS NOTE - NSBHFUPINTERVALHXFT_PSY_A_CORE
Patient seen and evaluated, she did not get PRNs overnight. Patient is AAOx3, cooperative, stated that she slept well, she reported feeling safe in the hospital but still believes someone has been trying to poison her.  Denies AH/VH, SI/HI.

## 2020-12-16 NOTE — BH CONSULTATION LIAISON PROGRESS NOTE - NSBHMSETHTCONTENT_PSY_A_CORE
Delusions/Suicidality
Delusions/Suicidality
Suicidality/Delusions
Suicidality/Delusions
Suicidality/Delusions/Preoccupations

## 2020-12-16 NOTE — BH INPATIENT PSYCHIATRY ASSESSMENT NOTE - NSBHCRANIAL_PSY_ALL_CORE
Recognizes 2 fingers or can read (II)/Opens mouth, sticks out tongue (V, XII)/Normal speech (IX, X, XII)/EOMI (III, IV, VI)/Shoulder shrug (XI)/Hearing intact (VIII)

## 2020-12-16 NOTE — BH CONSULTATION LIAISON PROGRESS NOTE - NSICDXBHTERTIARYDX_PSY_ALL_CORE
Dementia   F03.90  Delirium   R41.0  

## 2020-12-16 NOTE — BH INPATIENT PSYCHIATRY ASSESSMENT NOTE - NSBHCHARTREVIEWVS_PSY_A_CORE FT
Vital Signs Last 24 Hrs  T(C): 36.6 (16 Dec 2020 17:15), Max: 36.9 (15 Dec 2020 21:20)  T(F): 97.9 (16 Dec 2020 17:15), Max: 98.4 (15 Dec 2020 21:20)  HR: 82 (16 Dec 2020 12:42) (63 - 82)  BP: 131/62 (16 Dec 2020 12:42) (125/65 - 141/70)  BP(mean): --  RR: 18 (16 Dec 2020 17:15) (16 - 18)  SpO2: 99% (16 Dec 2020 17:15) (97% - 99%)

## 2020-12-16 NOTE — BH CONSULTATION LIAISON PROGRESS NOTE - NSBHATTESTSEENBY_PSY_A_CORE
attending Psychiatrist without NP/Trainee
attending Psychiatrist without NP/Trainee
Attending Psychiatrist supervising NP/Trainee, meeting pt...
Attending Psychiatrist supervising NP/Trainee, meeting pt...
attending Psychiatrist without NP/Trainee

## 2020-12-16 NOTE — BH CONSULTATION LIAISON PROGRESS NOTE - NSBHCHARTREVIEWVS_PSY_A_CORE FT
Vital Signs Last 24 Hrs  T(C): 36.6 (16 Dec 2020 05:17), Max: 36.9 (15 Dec 2020 21:20)  T(F): 97.8 (16 Dec 2020 05:17), Max: 98.4 (15 Dec 2020 21:20)  HR: 63 (16 Dec 2020 05:17) (63 - 81)  BP: 125/65 (16 Dec 2020 05:17) (123/67 - 141/70)  BP(mean): --  RR: 16 (16 Dec 2020 05:17) (16 - 17)  SpO2: 97% (16 Dec 2020 05:17) (97% - 98%)
Vital Signs Last 24 Hrs  T(C): 36.7 (15 Dec 2020 14:12), Max: 36.8 (14 Dec 2020 21:52)  T(F): 98.1 (15 Dec 2020 14:12), Max: 98.2 (14 Dec 2020 21:52)  HR: 81 (15 Dec 2020 14:12) (64 - 85)  BP: 123/67 (15 Dec 2020 14:12) (123/67 - 153/71)  BP(mean): --  RR: 17 (15 Dec 2020 14:12) (16 - 18)  SpO2: 98% (15 Dec 2020 14:12) (97% - 98%)
Vital Signs Last 24 Hrs  T(C): 36.6 (13 Dec 2020 06:58), Max: 36.6 (12 Dec 2020 21:13)  T(F): 97.8 (13 Dec 2020 06:58), Max: 97.9 (12 Dec 2020 21:13)  HR: 67 (13 Dec 2020 06:58) (67 - 90)  BP: 131/60 (13 Dec 2020 06:58) (123/65 - 131/60)  BP(mean): --  RR: 16 (13 Dec 2020 06:58) (16 - 16)  SpO2: 100% (13 Dec 2020 06:58) (100% - 100%)
Vital Signs Last 24 Hrs  T(C): 36.2 (12 Dec 2020 05:56), Max: 36.9 (11 Dec 2020 21:38)  T(F): 97.2 (12 Dec 2020 05:56), Max: 98.4 (11 Dec 2020 21:38)  HR: 68 (12 Dec 2020 05:56) (68 - 100)  BP: 138/60 (12 Dec 2020 05:56) (138/60 - 173/94)  BP(mean): --  RR: 16 (12 Dec 2020 05:56) (16 - 17)  SpO2: 100% (12 Dec 2020 05:56) (96% - 100%)
Vital Signs Last 24 Hrs  T(C): 36.2 (14 Dec 2020 06:48), Max: 37.2 (13 Dec 2020 21:17)  T(F): 97.2 (14 Dec 2020 06:48), Max: 98.9 (13 Dec 2020 21:17)  HR: 78 (14 Dec 2020 06:48) (76 - 78)  BP: 142/74 (14 Dec 2020 06:48) (118/60 - 142/74)  BP(mean): --  RR: 18 (14 Dec 2020 06:48) (17 - 18)  SpO2: 100% (14 Dec 2020 06:48) (97% - 100%)

## 2020-12-16 NOTE — BH CONSULTATION LIAISON PROGRESS NOTE - NSBHCONSULTRECOMMENDOTHER_PSY_A_CORE FT
Needs [facility] admission
Needs [facility] admission

## 2020-12-16 NOTE — BH CONSULTATION LIAISON PROGRESS NOTE - NSBHADMITIPOBS_PSY_A_CORE
Routine observation
Rituxan Pregnancy And Lactation Text: This medication is Pregnancy Category C and it isn't know if it is safe during pregnancy. It is unknown if this medication is excreted in breast milk but similar antibodies are known to be excreted.

## 2020-12-16 NOTE — BH PATIENT PROFILE - STATED REASON FOR ADMISSION
Pt stated, "I told my children I am here for depression.  I want to protect them." Pt paranoid with flight of ideas, pt started to speak about her clothes and when redirected pt stated, "There's something else going on but I can't talk about it here."  Pt spoke about "tainted" foods and poor PO intake.

## 2020-12-16 NOTE — BH CONSULTATION LIAISON PROGRESS NOTE - NSBHPSYCHOLCOGORIENT_PSY_A_CORE
Not fully oriented...
Not fully oriented...
Oriented to time, place, person, situation
Not fully oriented...
Oriented to time, place, person, situation

## 2020-12-16 NOTE — BH INPATIENT PSYCHIATRY ASSESSMENT NOTE - RISK ASSESSMENT
Risk factors- passive suicidal ideation, poor insight, non compliant with medication, depression, psychosis, elderly, lives alone, not caring for self & recent SIB    protective factors- no HI, no hx of suicide attempts, no familai/hypomania, no aggression/violence, no legal issues, no substance abuse Patient is at moderate acute risk due to acute sxs of depressed mood, passive SI, and psychosis. Chronic risk factors include: passive suicidal ideation, poor insight, non compliant with medication, depression, psychosis, elderly, lives alone, not caring for self & recent SIB. Protective factors include: no HI, no hx of suicide attempts, no familia/hypomania, no aggression/violence, no legal issues, no substance abuse. Patient meets criteria for inpatient psychiatric admission for safety and stabilization.

## 2020-12-16 NOTE — BH CONSULTATION LIAISON PROGRESS NOTE - NSBHADMITCOUNSEL_PSY_A_CORE
risks and benefits of treatment options/instructions for management, treatment and follow up/client/family/caregiver education
client/family/caregiver education/risks and benefits of treatment options/instructions for management, treatment and follow up

## 2020-12-16 NOTE — BH CONSULTATION LIAISON PROGRESS NOTE - NSCDBILL_PSY_A_CORE
Time based billing
Time based billing
[id number] - Inpatient, high complexity
[id number] - Inpatient, high complexity
[id number] - Inpatient, moderate complexity

## 2020-12-16 NOTE — PROGRESS NOTE ADULT - PROBLEM SELECTOR PLAN 1
- unclear etiology of psychotic episode: potentially depression w/ episode of psychosis vs dementia w/ episode of psychosis   - CT head: negative for acute findings   - TSH wnl  - Urine and serum toxicology negative   - Zyprexa 1.25 prn agitation, per psych  - c/w risperdal 0.25 in morning and .5 at bedtime  - Psych c/s: pt does not have capacity to leave AMA or to refuse care  - likely candidate for inpatient thor psych at [facility]: family is agreeable to transfer pt to inpatient thor psych  - pending available bed at [facility]   - SW c/s  - most recent QTC on 12/11: 465

## 2020-12-16 NOTE — BH CONSULTATION LIAISON PROGRESS NOTE - NSICDXBHPRIMARYDX_PSY_ALL_CORE
Psychosis, unspecified psychosis type   F29  

## 2020-12-16 NOTE — BH INPATIENT PSYCHIATRY ASSESSMENT NOTE - NSTXPSYCHOINTERMD_PSY_ALL_CORE
This is a is 80 year old  female, , domiciled alone, w/ PPH MDD/Anxiety which started after her ’s death 3 years ago, not in outpt ttx, no hx of inpt psych admissions or suicide attempts, no hx of aggression, violence, legal issues or substance abuse problems, w/ PMH ofHTN, HLD, OAB, Cholelithiasis with acute cholecystitis & Diverticulosis. Patient presented to [facility] on 12/11 with psychosis, admitted to medicine for cystitis and seen by  psychiatry and found to have paranoid delusions of being poisoned resulting in depression, passive SI, and functional impairment of ADLs. Patient now being admitted 2PC to [facility] 2S for management of psychosis. On admission, patient is psychotically paranoid, confused, and endorses depression and passive SI. Given her psychosis is impairing her ability to care for herself, she meets criteria for inpatient psychiatric admission for safety and stabilization.    PLAN:  - Admit to 2S. Patient requires inpatient hospitalization for stabilization and safety.  - Legal: 2PC  - Safety: Routine obs  - Psych, standing: Continue Risperdal 0.25 mg PO qam and 0.5 mg po QHS for ongoing psychosis, if qtc<500.  - Psych, PRN: Zyprexa 1.25 mg PO/IM PRN agitation  - Medical: Continued med rec, including: amlodipine 2.5 mg po, melatonin 6 mg QHS, pantoprazole 40 mg PO DR daily, PEG 17 gm daily  - Substance: NTD  - Diet: Dash/TLC  - Consult: Consider PT evaluation in am.   - Lab: ECG

## 2020-12-16 NOTE — BH PATIENT PROFILE - HOME MEDICATIONS
polyethylene glycol 3350 oral powder for reconstitution , 17 gram(s) orally once a day  pantoprazole 40 mg oral delayed release tablet , 1 tab(s) orally once a day (before a meal)  senna oral tablet , 2 tab(s) orally once a day (at bedtime)  OLANZapine , 1.25 milligram(s) orally once a day, As Needed for agitation  risperiDONE 0.5 mg oral tablet , 1 tab(s) orally once a day (at bedtime)  melatonin 3 mg oral tablet , 2 tab(s) orally once a day (at bedtime)  amLODIPine 2.5 mg oral tablet , 1 tab(s) orally once a day

## 2020-12-16 NOTE — BH INPATIENT PSYCHIATRY ASSESSMENT NOTE - CURRENT MEDICATION
MEDICATIONS  (STANDING):  amLODIPine   Tablet 2.5 milliGRAM(s) Oral daily  melatonin. 6 milliGRAM(s) Oral at bedtime  pantoprazole    Tablet 40 milliGRAM(s) Oral before breakfast  polyethylene glycol 3350 17 Gram(s) Oral daily  risperiDONE   Tablet 0.5 milliGRAM(s) Oral at bedtime  senna 2 Tablet(s) Oral at bedtime    MEDICATIONS  (PRN):  acetaminophen   Tablet .. 650 milliGRAM(s) Oral every 6 hours PRN Mild Pain (1 - 3), Moderate Pain (4 - 6)  OLANZapine 1.25 milliGRAM(s) Oral every 24 hours PRN agitation

## 2020-12-16 NOTE — BH CONSULTATION LIAISON PROGRESS NOTE - NSBHASSESSMENTFT_PSY_ALL_CORE
80 year old   female currently residing alone. PPH MDD/Anxiety after her husbands death 3 years ago. No hx of inpatient admissions or suicide attempts. No hx of aggression, violence, legal issues or substance abuse problems. PMH-HTN, HLD, OAB, Cholelithiasis with acute cholecystitis & Diverticulosis. BIB family referred by NOLA/MD for psychosis. Patient presented to the ED in the context of psychosis. Patient presents with paranoid delusions  (feels that her neighbors are mixing something in her food to make her uncomfortable constipated. She  reports her reasons for not eating as the blockage in her lower abdomen. She has endorsed suicidal ideation to son (denies active SI/plan/intent) as well as to CL team, and depression related to her paranoid delusions. Patient denies active SI/plan/intent. Patient not caring for self- losing weight, decrease in ADLs. She is not at baseline with poor insight. She presents at imminent risk to self and continues to require inpatient admission for safety and stabilization.     Patient's paranoia  seems to be going on for a year. She has h/o fall because of generalized weakness caused by  poor oral intake , this time she is presenting with high WBC. She is  medically admitted  for cystitis and will  be continued  to be followed by  psychiatry.     12/13: Slept well overnight, no PRNs required. Patient calm and cooperative with interview. States she has done bad things and that "they are going to make me suffer". She does not know who she means by "they". She does not feel safe in the hospital. Denies AH, thought withdrawal, but endorses vague paranoid and persecutory delusions, referential delusions (tv and signs/books sending her messages), thought insertion and thought broadcasting. States she is always very sad, anxious. C/o passive SI, denies active SI or HI. AAOx3.    12/14: Did not sleep well o/n. Patient guarded, repeating multiple times that she wants to go home. Reports paranoia and delusions that her neighbors are watching her and poisoning her food. Patient minimally able to reality check. Given the time frame of her symptoms, DDx includes MDD with psychosis vs. primary psychosis, with delirium potentially worsening recent symptoms. R/o underlying cognitive impairment.    12/15; Pt. seen, cooperative, but still guarded, pt. remains acute psychotic and delusional. Denies AH and VH, denies SI and HI. Son is extremely concerned about pt.'s safety and he is amenable for the patient’s inpatient admission at [facility].     12/16: Pt. seen, cooperative, AAOX3, remains psychotic and delusional. Denies AH and VH, denies SI and HI. Son is extremely concerned about pt.'s safety and he is amenable for the patient’s inpatient admission at [facility].     PLAN:  1. Continue Risperdal 0.25mg PO qam and 0.5mg po qhs for ongoing psychosis if qtc <500  2. Continue Zyprexa 1.25mg PO/IM PRN for agitation if qtc <500  3. Patient will require psychiatric inpatient admission after medical clearance as she is not able to care for herself at home and therefore presents a danger to herself due to severity of psychotic symptoms.   4- 2 PC completed, placed in the chart.  5- Pt. cannot leave AMA.      Will follow

## 2020-12-16 NOTE — PROGRESS NOTE ADULT - PROBLEM SELECTOR PLAN 8
- unable to pay for Housing  - f/u SW c/s  - clear from a medical standpoint. Main problem is psychiatric in nature, awaiting final psych recommendations regarding transfer to geriatric inpatient at [facility].

## 2020-12-16 NOTE — DISCHARGE NOTE NURSING/CASE MANAGEMENT/SOCIAL WORK - PATIENT PORTAL LINK FT
You can access the FollowMyHealth Patient Portal offered by Health system by registering at the following website: [email or web address]. By joining [employer]’s FollowMyHealth portal, you will also be able to view your health information using other applications (apps) compatible with our system.

## 2020-12-16 NOTE — BH PATIENT PROFILE - NSNUTRITIONASSESSEATINGHABITSFT_GEN_ALL_CORE
Pt paranoid that her food is being poisoned with "yellow specks" which cause constipation has poor PO intake due to this

## 2020-12-16 NOTE — BH INPATIENT PSYCHIATRY ASSESSMENT NOTE - NSBHMETABOLIC_PSY_ALL_CORE_FT
BMI: BMI (kg/m2): 20.8 (12-16-20 @ 17:15)  HbA1c:   Glucose: POCT Blood Glucose.: 99 mg/dL (12-01-20 @ 14:23)    BP: --  Lipid Panel:

## 2020-12-16 NOTE — PROGRESS NOTE ADULT - SUBJECTIVE AND OBJECTIVE BOX
**** INCOMPLETE******  PROGRESS NOTE:     CONTACT INFO:  Marlene Romerojak   PGY-1 Internal Medicine  [id number]    Patient is a 80y old  Female who presents with a chief complaint of "I was screaming" (15 Dec 2020 09:25)      SUBJECTIVE / OVERNIGHT EVENTS:   Patient seen and evaluated at bedside. No fever/chills.  Denies SOB at rest, chest pain, palpitations, abdominal pain, nausea/vomiting      MEDICATIONS  (STANDING):  amLODIPine   Tablet 2.5 milliGRAM(s) Oral daily  heparin   Injectable 5000 Unit(s) SubCutaneous every 12 hours  influenza  Vaccine (HIGH DOSE) 0.7 milliLiter(s) IntraMuscular once  melatonin 6 milliGRAM(s) Oral at bedtime  pantoprazole    Tablet 40 milliGRAM(s) Oral before breakfast  risperiDONE   Tablet 0.25 milliGRAM(s) Oral <User Schedule>  risperiDONE   Tablet 0.5 milliGRAM(s) Oral at bedtime  senna 2 Tablet(s) Oral at bedtime    MEDICATIONS  (PRN):  OLANZapine 1.25 milliGRAM(s) Oral daily PRN Agitation      CAPILLARY BLOOD GLUCOSE        I&O's Summary      PHYSICAL EXAM:  Vital Signs Last 24 Hrs  T(C): 36.6 (16 Dec 2020 05:17), Max: 36.9 (15 Dec 2020 21:20)  T(F): 97.8 (16 Dec 2020 05:17), Max: 98.4 (15 Dec 2020 21:20)  HR: 63 (16 Dec 2020 05:17) (63 - 81)  BP: 125/65 (16 Dec 2020 05:17) (123/67 - 141/70)  BP(mean): --  RR: 16 (16 Dec 2020 05:17) (16 - 17)  SpO2: 97% (16 Dec 2020 05:17) (97% - 98%)    CONSTITUTIONAL: NAD, well-developed  RESPIRATORY: Normal respiratory effort; lungs are clear to auscultation bilaterally  CARDIOVASCULAR: Regular rate and rhythm, normal S1 and S2, no murmur/rub/gallop; No lower extremity edema; Peripheral pulses are 2+ bilaterally  ABDOMEN: Nontender to palpation, normoactive bowel sounds, no rebound/guarding; No hepatosplenomegaly  MUSCLOSKELETAL: no clubbing or cyanosis of digits; no joint swelling or tenderness to palpation  PSYCH: A+O to person, place, and time; affect appropriate    LABS:                        10.7   4.93  )-----------( 337      ( 15 Dec 2020 07:57 )             32.7     12-15    138  |  103  |  12  ----------------------------<  93  3.5   |  25  |  0.74    Ca    9.0      15 Dec 2020 07:57                  RADIOLOGY & ADDITIONAL TESTS:  Results Reviewed:   Imaging Personally Reviewed:  Electrocardiogram Personally Reviewed:    COORDINATION OF CARE:  Care Discussed with Consultants/Other Providers [Y/N]:  Prior or Outpatient Records Reviewed [Y/N]:   CONTACT INFO:  Marlene Romerojak   PGY-1 Internal Medicine  [id number]    Patient is a 80y old  Female who presents with a chief complaint of "I was screaming" (15 Dec 2020 09:25)      SUBJECTIVE / OVERNIGHT EVENTS:   Patient seen and evaluated at bedside. Pt eating breakfast tolerating PO diet. Still states she feels depressed this AM. Denies SOB at rest, chest pain, palpitations, abdominal pain, nausea/vomiting      MEDICATIONS  (STANDING):  amLODIPine   Tablet 2.5 milliGRAM(s) Oral daily  heparin   Injectable 5000 Unit(s) SubCutaneous every 12 hours  influenza  Vaccine (HIGH DOSE) 0.7 milliLiter(s) IntraMuscular once  melatonin 6 milliGRAM(s) Oral at bedtime  pantoprazole    Tablet 40 milliGRAM(s) Oral before breakfast  risperiDONE   Tablet 0.25 milliGRAM(s) Oral <User Schedule>  risperiDONE   Tablet 0.5 milliGRAM(s) Oral at bedtime  senna 2 Tablet(s) Oral at bedtime    MEDICATIONS  (PRN):  OLANZapine 1.25 milliGRAM(s) Oral daily PRN Agitation      CAPILLARY BLOOD GLUCOSE        I&O's Summary      PHYSICAL EXAM:  Vital Signs Last 24 Hrs  T(C): 36.6 (16 Dec 2020 05:17), Max: 36.9 (15 Dec 2020 21:20)  T(F): 97.8 (16 Dec 2020 05:17), Max: 98.4 (15 Dec 2020 21:20)  HR: 63 (16 Dec 2020 05:17) (63 - 81)  BP: 125/65 (16 Dec 2020 05:17) (123/67 - 141/70)  BP(mean): --  RR: 16 (16 Dec 2020 05:17) (16 - 17)  SpO2: 97% (16 Dec 2020 05:17) (97% - 98%)    CONSTITUTIONAL: NAD, well-developed  RESPIRATORY: Normal respiratory effort; lungs are clear to auscultation bilaterally  CARDIOVASCULAR: Regular rate and rhythm, normal S1 and S2, no murmur/rub/gallop; No lower extremity edema; Peripheral pulses are 2+ bilaterally  ABDOMEN: Nontender to palpation, normoactive bowel sounds, no rebound/guarding; No hepatosplenomegaly  MUSCLOSKELETAL: no clubbing or cyanosis of digits; no joint swelling or tenderness to palpation  PSYCH: A+O to person, place, and time; depressed affect     LABS:                        10.7   4.93  )-----------( 337      ( 15 Dec 2020 07:57 )             32.7     12-15    138  |  103  |  12  ----------------------------<  93  3.5   |  25  |  0.74    Ca    9.0      15 Dec 2020 07:57                  RADIOLOGY & ADDITIONAL TESTS:  Results Reviewed:   Imaging Personally Reviewed:  Electrocardiogram Personally Reviewed:    COORDINATION OF CARE:  Care Discussed with Consultants/Other Providers [Y/N]:  Prior or Outpatient Records Reviewed [Y/N]:

## 2020-12-17 PROCEDURE — 99232 SBSQ HOSP IP/OBS MODERATE 35: CPT

## 2020-12-17 PROCEDURE — 99223 1ST HOSP IP/OBS HIGH 75: CPT

## 2020-12-17 RX ORDER — SERTRALINE 25 MG/1
25 TABLET, FILM COATED ORAL DAILY
Refills: 0 | Status: DISCONTINUED | OUTPATIENT
Start: 2020-12-17 | End: 2020-12-17

## 2020-12-17 RX ORDER — OLANZAPINE 15 MG/1
2.5 TABLET, FILM COATED ORAL AT BEDTIME
Refills: 0 | Status: DISCONTINUED | OUTPATIENT
Start: 2020-12-17 | End: 2020-12-18

## 2020-12-17 RX ORDER — SERTRALINE 25 MG/1
12.5 TABLET, FILM COATED ORAL ONCE
Refills: 0 | Status: DISCONTINUED | OUTPATIENT
Start: 2020-12-17 | End: 2020-12-17

## 2020-12-17 RX ORDER — ENOXAPARIN SODIUM 100 MG/ML
30 INJECTION SUBCUTANEOUS DAILY
Refills: 0 | Status: DISCONTINUED | OUTPATIENT
Start: 2020-12-17 | End: 2020-12-17

## 2020-12-17 RX ADMIN — Medication 6 MILLIGRAM(S): at 21:17

## 2020-12-17 RX ADMIN — AMLODIPINE BESYLATE 2.5 MILLIGRAM(S): 2.5 TABLET ORAL at 09:25

## 2020-12-17 RX ADMIN — PANTOPRAZOLE SODIUM 40 MILLIGRAM(S): 20 TABLET, DELAYED RELEASE ORAL at 06:42

## 2020-12-17 RX ADMIN — OLANZAPINE 2.5 MILLIGRAM(S): 15 TABLET, FILM COATED ORAL at 21:18

## 2020-12-17 RX ADMIN — SENNA PLUS 2 TABLET(S): 8.6 TABLET ORAL at 21:18

## 2020-12-17 RX ADMIN — POLYETHYLENE GLYCOL 3350 17 GRAM(S): 17 POWDER, FOR SOLUTION ORAL at 09:25

## 2020-12-17 NOTE — BH INPATIENT PSYCHIATRY PROGRESS NOTE - NSBHCHARTREVIEWVS_PSY_A_CORE FT
Vital Signs Last 24 Hrs  T(C): 36.3 (17 Dec 2020 11:02), Max: 36.7 (16 Dec 2020 21:41)  T(F): 97.4 (17 Dec 2020 11:02), Max: 98 (16 Dec 2020 21:41)  HR: --  BP: 110/- (17 Dec 2020 07:55) (110/- - 110/-)  BP(mean): --  RR: 18 (16 Dec 2020 17:15) (18 - 18)  SpO2: 99% (16 Dec 2020 17:15) (99% - 99%)

## 2020-12-17 NOTE — BH INPATIENT PSYCHIATRY PROGRESS NOTE - NSBHASSESSSUMMFT_PSY_ALL_CORE
This is a is 80 year old  female, , domiciled alone, w/ PPH MDD/Anxiety which started after her ’s death 3 years ago, not in outpt ttx, no hx of inpt psych admissions or suicide attempts, no hx of aggression, violence, legal issues or substance abuse problems, w/ PMH ofHTN, HLD, OAB, Cholelithiasis with acute cholecystitis & Diverticulosis. Patient presented to [facility] on 12/11 with psychosis, admitted to medicine for cystitis and seen by  psychiatry and found to have paranoid delusions of being poisoned resulting in depression, passive SI, and functional impairment of ADLs. Patient now being admitted 2PC to [facility] 2S for management of psychosis. Upon presentation pt continuing to demonstrate impaired reality testing with depressed mood.  Current presentation consistent with MDD with psychosis.  Pt warrants involuntary psychiatric hospitalization for symptom stabilization.      Plan:  1) Will change risperidone to olanzapine 2.5mg hs as may help with longstanding insomnia and has more evidence basis. Consider  adding SSRI but has hx low Na so will hold off pending more review. If MDD plus psychosis  clearly established consider ECT  2) Medical- amlodipine 2.5mg, pantoprazole 40mg, senna, Miralax. Recent low NA will observe. Reduce BP med  if orthosttic  3) No substance abuse concerns  4) No need for CO, proceed with routine checks  5) Pt will benefit from structured mileu environment  60PT given fall hx

## 2020-12-17 NOTE — BH SOCIAL WORK INITIAL PSYCHOSOCIAL EVALUATION - SAFE PLACE TO LIVE - DETAILS
Pt. is currently concerned that she has no money and needs to sell her condo although according to her son this is a delusion.

## 2020-12-17 NOTE — PSYCHIATRIC REHAB INITIAL EVALUATION - NSBHPRTOOLBOX_PSY_ALL_CORE
Patient stated she has not interest in participating in activities. Patient stated at her baseline she does not enjoy doing anything./Other

## 2020-12-17 NOTE — CONSULT NOTE ADULT - SUBJECTIVE AND OBJECTIVE BOX
HPI:     81 y/o Female PMHx HTN, Anxiety/Depression, diverticulosis sent to [facility] ED on 12/12 after an episode of agitation. Pt with long hx of anxiety/depression that has worsened over the past 4 months, recently resulting in multiple outbursts of agitation, verbally abusing her family and hitting herself.  She endorses insomnia, decreased appetite, paranoia, anxiety, lack of energy, and passive SI, stating she "thinks about harming herself but never would because she loves her sons." She adamantly denies suicidal intent or plan.  Pt endorsed to psychiatry, delusions of people poisoning her food to make her constipated and of police plotting to come take her away to do bad things. At [facility] the patient was followed by psychiatric team adn was started on risperdal 0.25 in AM and 0.5 at bedtime for psychosis/delusional disorder. She completed a medical workup for agitation, CTH negative for ischemia showing chronic microvascular disease. She completed a course of macrobid for UTI prior to admission. No antibiotics administered during hospital stay. At this time the patient is repsonsive to questions. Reports chest pain left sided, no dyspnea, no abdominal pain, no N/V.       PAST MEDICAL & SURGICAL HISTORY:  Insomnia    Anxiety    OAB (overactive bladder)    Hiatal hernia    Diverticulosis    Cholelithiasis with acute cholecystitis    Hypertriglyceridemia    S/P hysterectomy    H/O abdominal hysterectomy        Review of Systems:   CONSTITUTIONAL: No fever, weight loss, or fatigue  EYES: No eye pain, visual disturbances, or discharge  ENMT:  No difficulty hearing, tinnitus, vertigo; No sinus or throat pain  NECK: No pain or stiffness  RESPIRATORY: No cough, wheezing, chills or hemoptysis; No shortness of breath  CARDIOVASCULAR: No chest pain, palpitations, dizziness, or leg swelling  GASTROINTESTINAL: No abdominal or epigastric pain. No nausea, vomiting, or hematemesis; No diarrhea or constipation. No melena or hematochezia.  GENITOURINARY: No dysuria, frequency, hematuria, or incontinence  NEUROLOGICAL: No headaches, memory loss, loss of strength, numbness, or tremors  SKIN: No itching, burning, rashes, or lesions   LYMPH NODES: No enlarged glands  ENDOCRINE: No heat or cold intolerance; No hair loss  MUSCULOSKELETAL: No joint pain or swelling; No muscle, back, or extremity pain  HEME/LYMPH: No easy bruising, or bleeding gums  ALLERY AND IMMUNOLOGIC: No hives or eczema    Allergies    sulfa drugs (Stomach Upset)    Intolerances        Social History:     Until a few months ago the patient was driving and living independently. 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 She has no history of drug or alcohol use and smoked one to 2 cigarettes daily but stopped over 30 years ago    FAMILY HISTORY:  FH: cardiovascular disease    FH: type 2 diabetes        MEDICATIONS  (STANDING):  influenza  Vaccine (HIGH DOSE) 0.7 milliLiter(s) IntraMuscular once  melatonin. 6 milliGRAM(s) Oral at bedtime  OLANZapine 2.5 milliGRAM(s) Oral at bedtime  pantoprazole    Tablet 40 milliGRAM(s) Oral before breakfast  polyethylene glycol 3350 17 Gram(s) Oral daily  senna 2 Tablet(s) Oral at bedtime  sertraline 12.5 milliGRAM(s) Oral once  sertraline 25 milliGRAM(s) Oral daily    MEDICATIONS  (PRN):  acetaminophen   Tablet .. 650 milliGRAM(s) Oral every 6 hours PRN Mild Pain (1 - 3), Moderate Pain (4 - 6)  OLANZapine 1.25 milliGRAM(s) Oral every 24 hours PRN agitation  OLANZapine Injectable 1.25 milliGRAM(s) IntraMuscular once PRN severe agitation      Vital Signs Last 24 Hrs  T(C): 36.3 (17 Dec 2020 11:02), Max: 36.7 (16 Dec 2020 21:41)  T(F): 97.4 (17 Dec 2020 11:02), Max: 98 (16 Dec 2020 21:41)  HR: --  BP: 110/- (17 Dec 2020 07:55) (110/- - 110/-)  BP(mean): --  RR: 18 (16 Dec 2020 17:15) (18 - 18)  SpO2: 99% (16 Dec 2020 17:15) (99% - 99%)  CAPILLARY BLOOD GLUCOSE    PHYSICAL EXAM:  GENERAL: NAD, well-developed  HEAD:  Atraumatic, Normocephalic  EYES: EOMI, conjunctiva and sclera clear  NECK: Supple, No JVD  CHEST/LUNG: Clear to auscultation bilaterally; No wheeze  HEART: Regular rate and rhythm; No murmurs, rubs, or gallops  ABDOMEN: Soft, Nontender, Nondistended; Bowel sounds present  EXTREMITIES:  2+ Peripheral Pulses, No clubbing, cyanosis, or edema  NEUROLOGY: non-focal  SKIN: No rashes or lesions    LABS:    Complete Blood Count in AM (12.15.20 @ 07:57)    Nucleated RBC: 0 /100 WBCs    WBC Count: 4.93 K/uL    RBC Count: 3.62 M/uL    Hemoglobin: 10.7 g/dL    Hematocrit: 32.7 %    Mean Cell Volume: 90.3 fL    Mean Cell Hemoglobin: 29.6 pg    Mean Cell Hemoglobin Conc: 32.7 gm/dL    Red Cell Distrib Width: 13.2 %    Platelet Count - Automated: 337 K/uL    Nucleated RBC #: 0.00 K/uL    Comprehensive Metabolic Panel (12.11.20 @ 14:30)    Sodium, Serum: 132 mmol/L    Potassium, Serum: 3.2 mmol/L    Chloride, Serum: 93 mmol/L    Carbon Dioxide, Serum: 25 mmol/L    Anion Gap, Serum: 14 mmol/L    Blood Urea Nitrogen, Serum: 15 mg/dL    Creatinine, Serum: 0.66 mg/dL    Glucose, Serum: 102 mg/dL    Calcium, Total Serum: 9.6 mg/dL    Protein Total, Serum: 7.4 g/dL    Albumin, Serum: 4.1 g/dL    Bilirubin Total, Serum: 0.3 mg/dL    Alkaline Phosphatase, Serum: 67 U/L    Aspartate Aminotransferase (AST/SGOT): 18 U/L    Alanine Aminotransferase (ALT/SGPT): 22 U/L    eGFR if Non : 85: The units for eGFR are ml/min/1.73m2 (normalized body surface area). The  eGFR is calculated from a serum creatinine using the CKD-EPI equation.  Other variables required for calculation are race, age and sex. Among  patients with chronic kidney disease (CKD), the eGFR is useful in  determining the stage of disease according to KDOQI CKD classification.  All eGFR results are reported numerically with the following  interpretation.      GFR  (ml/min/1.73 m2)          W/KIDNEY DAMAGE    W/O KIDNEY DMG  ==========================================================  >= 90.......................Stage 1..............Normal  60-89.......................Stage 2...........Decreased GFR  30-59.......................Stage 3..............Stage 3  15-29.......................Stage 4..............Stage 4  < 15........................Stage 5..............Stage 5  Each stage of CKD assumes that the associated GFR level has been in  effect for at least 3 months. Determination of stages one and two (with  eGFR > 59ml/min/m2) requires estimation of kidney damage for at least 3  months as defined by structural or functional abnormalities.  Limitations: All estimates of GFR will be less accurate for patients at  extremes of muscle mass (including but not limited to frail elderly,  critically ill, or cancer patients), those with unusual diets, and those  with conditions associated with reduced secretion or extrarenal  elimination of creatinine. The eGFR equation is not recommended for use  in patients with unstable creatinine levels. mL/min/1.73M2    eGFR if African American: 98 mL/min/1.73M2                    EKG(personally reviewed):    RADIOLOGY & ADDITIONAL TESTS:    Imaging Personally Reviewed:    Consultant(s) Notes Reviewed:      Care Discussed with Consultants/Other Providers:

## 2020-12-17 NOTE — BH INPATIENT PSYCHIATRY PROGRESS NOTE - NSBHFUPINTERVALHXFT_PSY_A_CORE
Patient seen for mood and psychotic symptoms. No overnight events. Sleep fair. Appetite fair, she appears somewhat mistrustful about food.

## 2020-12-17 NOTE — PSYCHIATRIC REHAB INITIAL EVALUATION - NSBHSUPNAMECHILD_PSY_ALL_CORE
Patient stated her two sons are somewhat supportive, but do not believe she is having financial difficulties.

## 2020-12-17 NOTE — BH INPATIENT PSYCHIATRY PROGRESS NOTE - NSTXPSYCHOINTERMD_PSY_ALL_CORE
This is a is 80 year old  female, , domiciled alone, w/ PPH MDD/Anxiety which started after her ’s death 3 years ago, not in outpt ttx, no hx of inpt psych admissions or suicide attempts, no hx of aggression, violence, legal issues or substance abuse problems, w/ PMH ofHTN, HLD, OAB, Cholelithiasis with acute cholecystitis & Diverticulosis. Patient presented to [facility] on 12/11 with psychosis, admitted to medicine for cystitis and seen by  psychiatry and found to have paranoid delusions of being poisoned resulting in depression, passive SI, and functional impairment of ADLs. Patient now being admitted 2PC to [facility] 2S for management of psychosis. On admission, patient is psychotically paranoid, confused, and endorses depression and passive SI. Given her psychosis is impairing her ability to care for herself, she meets criteria for inpatient psychiatric admission for safety and stabilization.    PLAN:  - Admit to 2S. Patient requires inpatient hospitalization for stabilization and safety.  - Legal: 2PC  - Safety: Routine obs  - Psych, standing: Continue Risperdal 0.25 mg PO qam and 0.5 mg po QHS for ongoing psychosis, if qtc<500.  - Psych, PRN: Zyprexa 1.25 mg PO/IM PRN agitation  - Medical: Continued med rec, including: amlodipine 2.5 mg po, melatonin 6 mg QHS, pantoprazole 40 mg PO DR daily, PEG 17 gm daily  - Substance: NTD  - Diet: Dash/TLC  - Consult: Consider PT evaluation in am.   - Lab: ECG

## 2020-12-17 NOTE — PSYCHIATRIC REHAB INITIAL EVALUATION - NSBHPRRECOMMEND_PSY_ALL_CORE
Writer met with the patient to orient her to the psych rehab services and to establish therapeutic goals. Patient stated at this time her goal is to repair her financial situation and not lose her condo.  According to the patient she was hospitalized because my sons believe I am depressed and they do not believe me when I say I don't have any money. According to the patient she is no longer receiving her social security or pension. Patient stated according to her sons she is still receiving her payments but she does not believe them. According to the patient she has a history of depression, but was never psychiatrically hospitalized. Patient denied any history of suicide attempts, substance abuse or ETOH abuse.     According to the chart patient has a three year history of depression which began after her  . According to the chart patient over the past months has become increasingly depressed, anxious, confused and paranoid. According to the chart patient believes her home and telephone are being monitored.  According to the chart patient believes her food is being poisoned. According to the chart patient does not shower because she is afraid she will fall. Furthermore, according to the chart patient has become paranoid of her neighbors and believes the police are going to arrest her.

## 2020-12-17 NOTE — CONSULT NOTE ADULT - ASSESSMENT
79 y/o Female PMHx HTN, Anxiety/Depression, diverticulosis sent to [facility] ED on 12/12 after an episode of agitation.     #HTN  - hold amlodipine 2.5 mg, /70, w/ recent fall with patient now initiated on risperdal    #Anemia  - normocytic no active bleeding episodes, if pt becomes symptomatic (worsening dysnea, dizzy, presyncopal) will need to repeat.     #Chest pain  - pt reported chest pain this morning stabbing in nature, non radiating, no dyspnea, hemodynamically stable, if persistent, worsening will need EKG to check for changes, d/w nursing staff.

## 2020-12-17 NOTE — BH SOCIAL WORK INITIAL PSYCHOSOCIAL EVALUATION - TRANSPORTATION - OTHER DETAILS
Pt.'s sons take pt. to her doctor visits although pt. reports she has no way of going for outpatient psych tx.

## 2020-12-18 RX ORDER — MIRTAZAPINE 45 MG/1
7.5 TABLET, ORALLY DISINTEGRATING ORAL AT BEDTIME
Refills: 0 | Status: DISCONTINUED | OUTPATIENT
Start: 2020-12-18 | End: 2020-12-21

## 2020-12-18 RX ORDER — RISPERIDONE 4 MG/1
0.25 TABLET ORAL EVERY 6 HOURS
Refills: 0 | Status: DISCONTINUED | OUTPATIENT
Start: 2020-12-18 | End: 2021-01-15

## 2020-12-18 RX ORDER — RISPERIDONE 4 MG/1
0.5 TABLET ORAL AT BEDTIME
Refills: 0 | Status: DISCONTINUED | OUTPATIENT
Start: 2020-12-18 | End: 2020-12-19

## 2020-12-18 RX ADMIN — PANTOPRAZOLE SODIUM 40 MILLIGRAM(S): 20 TABLET, DELAYED RELEASE ORAL at 06:43

## 2020-12-18 RX ADMIN — RISPERIDONE 0.5 MILLIGRAM(S): 4 TABLET ORAL at 21:26

## 2020-12-18 RX ADMIN — MIRTAZAPINE 7.5 MILLIGRAM(S): 45 TABLET, ORALLY DISINTEGRATING ORAL at 21:25

## 2020-12-18 RX ADMIN — SENNA PLUS 2 TABLET(S): 8.6 TABLET ORAL at 21:27

## 2020-12-18 NOTE — BH INPATIENT PSYCHIATRY PROGRESS NOTE - NSBHFUPINTERVALHXFT_PSY_A_CORE
Pt reports depressive symptoms including depressed mood, increased anxiety, inability to sleep. Discusses multiple delusions, feels being in the hospital is fake and we are giving her fake medication; feels she committed a fraud and is going to be punished by the authorities; talks about thinking she is under the eviction process at her condo. Pt very anxious, reports she feels dizzy. Taking medications.

## 2020-12-18 NOTE — BH INPATIENT PSYCHIATRY PROGRESS NOTE - NSBHASSESSSUMMFT_PSY_ALL_CORE
Pt presenting with MDD with psychotic features. given h/o hyponatremia, will start remeron and use risperidone, to alleviate dizziness from olanzapine. mentioned ECT to patient today, she is unwilling at this time.

## 2020-12-18 NOTE — DIETITIAN INITIAL EVALUATION ADULT. - OTHER INFO
Patient initially admitted to [facility] with paranoia, dx with cystitis. Transferred to [facility] for further psychiatric management. As per medical record, patient had not been eating PTA due to paranoid delusions of not having any money , wanting to save money so " I can stay in my condo" and paranoia that her food was poisoned with " yellow specks" which was causing constipation. DIet on admission ordered was DEYVI Benitez. Patient stated " My stomach is swollen from these meals and making me sick. I'm not Moravian".  Patient did not want Kosher diet. Spoke to MD to d/c Emmanuel CARNES and order Regular. Patient states she was eating poorly PTA x 3 weeks " I didn't want to shop or cook".  Reports usual weight is 115-117#.  Complained of constipation- discussed high fiber food items to add on meal plan- ie. salad platter, prune juice, raisin bran. Encouraged increased fluid intake. Orgain nutritional shake BID with lunch and dinner to be added ( 440 kcal, 32g protein)- patient stated she will try shake. Patient encouraged to increase po intake. Patient meets criteria for severe malnutrition in context of acute illness as evidenced by  <50% of nutrition needs and greater than 5% weight loss in one month. Patient initially admitted to [facility] with paranoia, dx with cystitis. Transferred to [facility] for further psychiatric management. As per medical record, patient had not been eating PTA due to paranoid delusions of not having any money , wanting to save money so " I can stay in my condo" and paranoia that her food was poisoned with " yellow specks" which was causing constipation. DIet on admission ordered was DEYVI Benitez. Patient stated " My stomach is swollen from these meals and making me sick. I'm not Yarsanism".  Patient did not want Kosher diet. Spoke to MD to d/c Emmanuel CARNES and order Regular. Patient states she was eating poorly PTA x 3 weeks " I didn't want to shop or cook".  Reports usual weight is 115-117#.  Complained of constipation- discussed high fiber food items to add on meal plan- ie. salad platter, prune juice, raisin bran. Encouraged increased fluid intake. Orgain nutritional shake BID with lunch and dinner to be added ( 440 kcal, 32g protein)- patient stated she will try shake. On Ensure Enlive as ordered. Patient encouraged to increase po intake. Patient meets criteria for severe malnutrition in context of acute illness as evidenced by  <50% of nutrition needs and greater than 5% weight loss in one month.

## 2020-12-18 NOTE — BH INPATIENT PSYCHIATRY PROGRESS NOTE - NSTXPSYCHOINTERMD_PSY_ALL_CORE
This is a is 80 year old  female, , domiciled alone, w/ PPH MDD/Anxiety which started after her ’s death 3 years ago, not in outpt ttx, no hx of inpt psych admissions or suicide attempts, no hx of aggression, violence, legal issues or substance abuse problems, w/ PMH ofHTN, HLD, OAB, Cholelithiasis with acute cholecystitis & Diverticulosis. Patient presented to [facility] on 12/11 with psychosis, admitted to medicine for cystitis and seen by  psychiatry and found to have paranoid delusions of being poisoned resulting in depression, passive SI, and functional impairment of ADLs. Patient now being admitted 2PC to [facility] 2S for management of psychosis. On admission, patient is psychotically paranoid, confused, and endorses depression and passive SI. Given her psychosis is impairing her ability to care for herself, she meets criteria for inpatient psychiatric admission for safety and stabilization.    PLAN:  - Admit to 2S. Patient requires inpatient hospitalization for stabilization and safety.  - Legal: 2PC  - Safety: Routine obs  - Psych, standing: Continue Risperdal 0.25 mg PO qam and 0.5 mg po QHS for ongoing psychosis, if qtc<500.  - Psych, PRN: Zyprexa 1.25 mg PO/IM PRN agitation  - Medical: Continued med rec, including: amlodipine 2.5 mg po, melatonin 6 mg QHS, pantoprazole 40 mg PO DR daily, PEG 17 gm daily  - Substance: NTD  - Diet: Dash/TLC  - Consult: Consider PT evaluation in am.   - Lab: ECG

## 2020-12-18 NOTE — BH INPATIENT PSYCHIATRY PROGRESS NOTE - NSBHCHARTREVIEWVS_PSY_A_CORE FT
Vital Signs Last 24 Hrs  T(C): 36 (18 Dec 2020 14:38), Max: 36.8 (18 Dec 2020 10:56)  T(F): 96.8 (18 Dec 2020 14:38), Max: 98.3 (18 Dec 2020 10:56)  HR: --  BP: --  BP(mean): --  RR: --  SpO2: --

## 2020-12-18 NOTE — DIETITIAN INITIAL EVALUATION ADULT. - REASON INDICATOR FOR ASSESSMENT
Patient needs therapeutic/prescribed diet Eating habits that you, loved ones, or caregiver are concerned about

## 2020-12-18 NOTE — DIETITIAN INITIAL EVALUATION ADULT. - CONTINUE CURRENT NUTRITION CARE PLAN
Provide food preferences. Monitor po intake, weights, labs./yes Continue Ensure Enlive; Trial Orgain shake BID with lunch and dinner; Provide food preferences; Monitor po intake, weights, and labs/yes

## 2020-12-19 PROCEDURE — 99232 SBSQ HOSP IP/OBS MODERATE 35: CPT

## 2020-12-19 RX ORDER — RISPERIDONE 4 MG/1
0.75 TABLET ORAL AT BEDTIME
Refills: 0 | Status: DISCONTINUED | OUTPATIENT
Start: 2020-12-19 | End: 2020-12-20

## 2020-12-19 RX ADMIN — MIRTAZAPINE 7.5 MILLIGRAM(S): 45 TABLET, ORALLY DISINTEGRATING ORAL at 20:55

## 2020-12-19 RX ADMIN — PANTOPRAZOLE SODIUM 40 MILLIGRAM(S): 20 TABLET, DELAYED RELEASE ORAL at 06:57

## 2020-12-19 RX ADMIN — RISPERIDONE 0.75 MILLIGRAM(S): 4 TABLET ORAL at 20:57

## 2020-12-19 NOTE — BH INPATIENT PSYCHIATRY PROGRESS NOTE - NSBHMSETHTCONTENT_PSY_A_CORE
Delusions/Preoccupations/Suicidality

## 2020-12-19 NOTE — BH INPATIENT PSYCHIATRY PROGRESS NOTE - NSBHFUPINTERVALHXFT_PSY_A_CORE
Patient seen for psychosis and depression. No overnight events no prns took meds. Sleep fair eating okay

## 2020-12-19 NOTE — BH INPATIENT PSYCHIATRY PROGRESS NOTE - NSBHMSESPEECH_PSY_A_CORE
Normal volume, rate, productivity, spontaneity and articulation

## 2020-12-19 NOTE — BH INPATIENT PSYCHIATRY PROGRESS NOTE - ADDITIONAL DETAILS / COMMENTS
Delusional impoverishment, paranoia, believes meds and staff are faked staged to look like a  real hospital

## 2020-12-19 NOTE — BH INPATIENT PSYCHIATRY PROGRESS NOTE - NSTXPSYCHOINTERMD_PSY_ALL_CORE
This is a is 80 year old  female, , domiciled alone, w/ PPH MDD/Anxiety which started after her ’s death 3 years ago, not in outpt ttx, no hx of inpt psych admissions or suicide attempts, no hx of aggression, violence, legal issues or substance abuse problems, w/ PMH ofHTN, HLD, OAB, Cholelithiasis with acute cholecystitis & Diverticulosis. Patient presented to [facility] on 12/11 with psychosis, admitted to medicine for cystitis and seen by  psychiatry and found to have paranoid delusions of being poisoned resulting in depression, passive SI, and functional impairment of ADLs. Patient now being admitted 2PC to [facility] 2S for management of psychosis. On admission, patient is psychotically paranoid, confused, and endorses depression and passive SI. Given her psychosis is impairing her ability to care for herself, she meets criteria for inpatient psychiatric admission for safety and stabilization.    PLAN:  - Admit to 2S. Patient requires inpatient hospitalization for stabilization and safety.  - Legal: 2PC  - Safety: Routine obs  - Psych, standing: Continue Risperdal 0.25 mg PO qam and 0.5 mg po QHS for ongoing psychosis, if qtc<500.  - Psych, PRN: Zyprexa 1.25 mg PO/IM PRN agitation  - Medical: Continued med rec, including: amlodipine 2.5 mg po, melatonin 6 mg QHS, pantoprazole 40 mg PO DR daily, PEG 17 gm daily  - Substance: NTD  - Diet: Dash/TLC  - Consult: Consider PT evaluation in am.   - Lab: ECG

## 2020-12-19 NOTE — BH INPATIENT PSYCHIATRY PROGRESS NOTE - NSBHCHARTREVIEWVS_PSY_A_CORE FT
Vital Signs Last 24 Hrs  T(C): 36.2 (19 Dec 2020 07:10), Max: 36.8 (18 Dec 2020 10:56)  T(F): 97.2 (19 Dec 2020 07:10), Max: 98.3 (18 Dec 2020 10:56)  HR: --  BP: --  BP(mean): --  RR: --  SpO2: --

## 2020-12-19 NOTE — BH INPATIENT PSYCHIATRY PROGRESS NOTE - NSBHPSYCHOLCOGORIENT_PSY_A_CORE
Oriented to time, place, person, situation

## 2020-12-19 NOTE — BH INPATIENT PSYCHIATRY PROGRESS NOTE - NSBHMSETHTPROC_PSY_A_CORE
Disorganized/Circumstantial/Illogical

## 2020-12-20 PROCEDURE — 99232 SBSQ HOSP IP/OBS MODERATE 35: CPT

## 2020-12-20 RX ORDER — RISPERIDONE 4 MG/1
1 TABLET ORAL AT BEDTIME
Refills: 0 | Status: DISCONTINUED | OUTPATIENT
Start: 2020-12-20 | End: 2020-12-21

## 2020-12-20 RX ADMIN — PANTOPRAZOLE SODIUM 40 MILLIGRAM(S): 20 TABLET, DELAYED RELEASE ORAL at 06:31

## 2020-12-20 NOTE — BH INPATIENT PSYCHIATRY PROGRESS NOTE - MSE UNSTRUCTURED FT
Patient in bed, partially cooperative. Soft speech, has some PMR, no EPS. Mood dysphoric. No SI. Has dense delusion that she is being forced out of her apartment. Believes that entire hospital is staged with actors and the meds are not real. no SI. No voices. Thinking ruminative. Oriented. Nil insight

## 2020-12-20 NOTE — BH INPATIENT PSYCHIATRY PROGRESS NOTE - NSBHADMITIPREASONDETAILS_PSY_A_CORE FT
Grossly psychotic but in control, no SI and taking meds
Grossly psychotic but in control, no SI and taking meds

## 2020-12-20 NOTE — BH INPATIENT PSYCHIATRY PROGRESS NOTE - NSBHFUPINTERVALHXFT_PSY_A_CORE
Patient seen for psychosis and depression. No overnight events no prns took meds. Sleep fair, better than self report, eating okay

## 2020-12-20 NOTE — BH INPATIENT PSYCHIATRY PROGRESS NOTE - NSTXPSYCHOINTERMD_PSY_ALL_CORE
This is a is 80 year old  female, , domiciled alone, w/ PPH MDD/Anxiety which started after her ’s death 3 years ago, not in outpt ttx, no hx of inpt psych admissions or suicide attempts, no hx of aggression, violence, legal issues or substance abuse problems, w/ PMH ofHTN, HLD, OAB, Cholelithiasis with acute cholecystitis & Diverticulosis. Patient presented to [facility] on 12/11 with psychosis, admitted to medicine for cystitis and seen by  psychiatry and found to have paranoid delusions of being poisoned resulting in depression, passive SI, and functional impairment of ADLs. Patient now being admitted 2PC to [facility] 2S for management of psychosis. On admission, patient is psychotically paranoid, confused, and endorses depression and passive SI. Given her psychosis is impairing her ability to care for herself, she meets criteria for inpatient psychiatric admission for safety and stabilization.    PLAN:  - Admit to 2S. Patient requires inpatient hospitalization for stabilization and safety.  - Legal: 2PC  - Safety: Routine obs  - Psych, standing: Continue Risperdal 0.25 mg PO qam and 0.5 mg po QHS for ongoing psychosis, if qtc<500.  - Psych, PRN: Zyprexa 1.25 mg PO/IM PRN agitation  - Medical: Continued med rec, including: amlodipine 2.5 mg po, melatonin 6 mg QHS, pantoprazole 40 mg PO DR daily, PEG 17 gm daily  - Substance: NTD  - Diet: Dash/TLC  - Consult: Consider PT evaluation in am.   - Lab: ECG

## 2020-12-21 PROCEDURE — 99232 SBSQ HOSP IP/OBS MODERATE 35: CPT

## 2020-12-21 RX ORDER — SENNA PLUS 8.6 MG/1
2 TABLET ORAL
Refills: 0 | Status: DISCONTINUED | OUTPATIENT
Start: 2020-12-21 | End: 2021-01-14

## 2020-12-21 RX ORDER — RISPERIDONE 4 MG/1
1 TABLET ORAL ONCE
Refills: 0 | Status: COMPLETED | OUTPATIENT
Start: 2020-12-21 | End: 2020-12-21

## 2020-12-21 RX ORDER — RISPERIDONE 4 MG/1
1 TABLET ORAL DAILY
Refills: 0 | Status: DISCONTINUED | OUTPATIENT
Start: 2020-12-22 | End: 2020-12-23

## 2020-12-21 RX ORDER — MIRTAZAPINE 45 MG/1
7.5 TABLET, ORALLY DISINTEGRATING ORAL
Refills: 0 | Status: DISCONTINUED | OUTPATIENT
Start: 2020-12-21 | End: 2020-12-22

## 2020-12-21 RX ORDER — RISPERIDONE 4 MG/1
1 TABLET ORAL
Refills: 0 | Status: DISCONTINUED | OUTPATIENT
Start: 2020-12-21 | End: 2020-12-21

## 2020-12-21 RX ADMIN — POLYETHYLENE GLYCOL 3350 17 GRAM(S): 17 POWDER, FOR SOLUTION ORAL at 09:53

## 2020-12-21 RX ADMIN — SENNA PLUS 2 TABLET(S): 8.6 TABLET ORAL at 17:30

## 2020-12-21 RX ADMIN — MIRTAZAPINE 7.5 MILLIGRAM(S): 45 TABLET, ORALLY DISINTEGRATING ORAL at 17:30

## 2020-12-21 RX ADMIN — RISPERIDONE 1 MILLIGRAM(S): 4 TABLET ORAL at 10:52

## 2020-12-21 NOTE — BH SCALES AND SCREENS - NSBPRSMANNPOST_PSY_ALL_CORE
2 = Very Mild – odd behavior but of doubtful clinical significance, e.g., occasional unprompted smiling, infrequent lip movements

## 2020-12-21 NOTE — BH INPATIENT PSYCHIATRY PROGRESS NOTE - NSBHCHARTREVIEWVS_PSY_A_CORE FT
Vital Signs Last 24 Hrs  T(C): 36.5 (21 Dec 2020 11:01), Max: 36.9 (21 Dec 2020 06:41)  T(F): 97.7 (21 Dec 2020 11:01), Max: 98.4 (21 Dec 2020 06:41)  HR: --  BP: --  BP(mean): --  RR: --  SpO2: --

## 2020-12-21 NOTE — PHYSICAL THERAPY INITIAL EVALUATION ADULT - PERTINENT HX OF CURRENT PROBLEM, REHAB EVAL
80 year old  female, , domiciled alone, w/ PPH MDD/Anxiety which started after her ’s death 3 years ago, not in outpt ttx, no hx of inpt psych admissions or suicide attempts, no hx of aggression, violence, legal issues or substance abuse problems, w/ PMH of HTN, HLD, OAB, Cholelithiasis with acute cholecystitis & Diverticulosis.

## 2020-12-21 NOTE — PHYSICAL THERAPY INITIAL EVALUATION ADULT - GENERAL OBSERVATIONS, REHAB EVAL
Patient received in bed in no apparent distress. However, reported dizziness upon demonstrating supine to sit transfer.

## 2020-12-21 NOTE — BH INPATIENT PSYCHIATRY PROGRESS NOTE - NSBHFUPINTERVALHXFT_PSY_A_CORE
Pt seen for follow up depression with psychosis. Chart reviewed, pt refused last night's QHS Remeron, risperidone, and senna.     On interview this AM pt stated that she was depressed, said that she was homeless and lost her home. Pt stated that nobody was on the unit, and that the treatment team wanted her out. Denied AVH. Endorsed intermittent SI without intent or plan though denied current SI/HI. Endorsed feeling low energy, guilty, hopeless, poor concentration, anhedonia. Endorsed intermittent nausea, pt encouraged to let staff know when she has nausea to be provided help. Endorsed chills, temp from 11:01 was 97.7 per chart review. Discussed pros and cons of ECT with pt including but not limited to benefit for psychotic depression, and side effects of memory loss and temporary dizziness/HA. Pt declined ECT, stated that she refused it but did not elaborate. Pt did not communicate to interviewer an understanding of the potential benefits of ECT as just described by the interviewer. Interviewer to contact collateral. Pt seen for follow up depression with psychosis. Chart reviewed, pt refused last night's QHS Remeron, risperidone, and senna.     On interview this AM pt stated that she was depressed, said that she was homeless and lost her home. Pt stated that nobody was on the unit, and that the treatment team wanted her out. Denied AVH. Endorsed intermittent SI without intent or plan though denied current SI/HI. Endorsed feeling low energy, guilty, hopeless, poor concentration, anhedonia. Endorsed intermittent nausea, pt encouraged to let staff know when she has nausea to be provided help. Endorsed chills, temp from 11:01 was 97.7 per chart review. Discussed pros and cons of ECT with pt including but not limited to benefit for psychotic depression, and side effects of memory loss and temporary dizziness/HA. Pt declined ECT, stated that she refused it but did not elaborate. Pt did not communicate to interviewer an understanding of the potential benefits of ECT as just described by the interviewer. Interviewer to contact collateral.    Spoke with son Khoi [phone]:  Said that pt has no dental issues, no loose issues. Said that pt's recent depression initiated roughly 2 months ago when she received notice from her bank that they would be moving exclusively to online banking, and pt does not know how to do online banking. So, pt went to the bank many times to try and take out all her money. This progressed to pt saying that the bank was trying to steal her money, then that her son was trying to steal her money, that the food was poisoned, and other paranoid delusions. Discussed ECT including risks and benefits including but not limited to memory loss, HA, dizziness, efficacy for depression with psychosis. Khoi said he would discuss with his brother Altaf. Updated Khoi on medications Remeron and Risperidone. Khoi expressed appreciation and understanding.

## 2020-12-21 NOTE — BH INPATIENT PSYCHIATRY PROGRESS NOTE - NSTXDEPRESINTERMD_PSY_ALL_CORE
Will increase riasperdal if BPs stable   continue titrating risperdal and remeron, will consider ECT

## 2020-12-21 NOTE — BH INPATIENT PSYCHIATRY PROGRESS NOTE - NSTXCOPEINTERMD_PSY_ALL_CORE
Will increase risperidone to 1mg hs, plan titration of Remeron, push fluids Will increase risperidone to 1mg QD, plan titration of Remeron at 17:00, push fluids

## 2020-12-21 NOTE — BH INPATIENT PSYCHIATRY PROGRESS NOTE - NSTXPSYCHOINTERMD_PSY_ALL_CORE
This is a is 80 year old  female, , domiciled alone, w/ PPH MDD/Anxiety which started after her ’s death 3 years ago, not in outpt ttx, no hx of inpt psych admissions or suicide attempts, no hx of aggression, violence, legal issues or substance abuse problems, w/ PMH ofHTN, HLD, OAB, Cholelithiasis with acute cholecystitis & Diverticulosis. Patient presented to [facility] on 12/11 with psychosis, admitted to medicine for cystitis and seen by  psychiatry and found to have paranoid delusions of being poisoned resulting in depression, passive SI, and functional impairment of ADLs. Patient now being admitted 2PC to [facility] 2S for management of psychosis. On admission, patient is psychotically paranoid, confused, and endorses depression and passive SI. Given her psychosis is impairing her ability to care for herself, she meets criteria for inpatient psychiatric admission for safety and stabilization.    PLAN:  - Admit to 2S. Patient requires inpatient hospitalization for stabilization and safety.  - Legal: 2PC  - Safety: Routine obs  - Psych, standing: Continue Risperdal 0.25 mg PO qam and 0.5 mg po QHS for ongoing psychosis, if qtc<500.  - Psych, PRN: Zyprexa 1.25 mg PO/IM PRN agitation  - Medical: Continued med rec, including: amlodipine 2.5 mg po, melatonin 6 mg QHS, pantoprazole 40 mg PO DR daily, PEG 17 gm daily  - Substance: NTD  - Diet: Dash/TLC  - Consult: Consider PT evaluation in am.   - Lab: ECG This is a is 80 year old  female, , domiciled alone, w/ PPH MDD/Anxiety which started after her ’s death 3 years ago, not in outpt ttx, no hx of inpt psych admissions or suicide attempts, no hx of aggression, violence, legal issues or substance abuse problems, w/ PMH ofHTN, HLD, OAB, Cholelithiasis with acute cholecystitis & Diverticulosis. Patient presented to [facility] on 12/11 with psychosis, admitted to medicine for cystitis and seen by  psychiatry and found to have paranoid delusions of being poisoned resulting in depression, passive SI, and functional impairment of ADLs. Patient now being admitted 2PC to [facility] for management of psychosis. On admission, patient is psychotically paranoid, confused, and endorses depression and passive SI. Given her psychosis is impairing her ability to care for herself, she meets criteria for inpatient psychiatric admission for safety and stabilization.    PLAN:  -Patient requires inpatient hospitalization for stabilization and safety.  - Legal: 2PC  - Safety: Routine obs  - Psych, standing: Risperidone 1mg QD, Remeron 7.5mg QHS  - Psych, PRN: Zyprexa 1.25 mg PO/IM PRN agitation  - Medical: Continued med rec, including: amlodipine 2.5 mg po, melatonin 6 mg QHS, pantoprazole 40 mg PO DR daily, PEG 17 gm daily  - Diet: Dash/TLC This is a is 80 year old  female, , domiciled alone, w/ PPH MDD/Anxiety which started after her ’s death 3 years ago, not in outpt ttx, no hx of inpt psych admissions or suicide attempts, no hx of aggression, violence, legal issues or substance abuse problems, w/ PMH ofHTN, HLD, OAB, Cholelithiasis with acute cholecystitis & Diverticulosis. Patient presented to [facility] on 12/11 with psychosis, admitted to medicine for cystitis and seen by  psychiatry and found to have paranoid delusions of being poisoned resulting in depression, passive SI, and functional impairment of ADLs. Patient now being admitted 2PC to [facility] for management of psychosis. On admission, patient is psychotically paranoid, confused, and endorses depression and passive SI. Given her psychosis is impairing her ability to care for herself, she meets criteria for inpatient psychiatric admission for safety and stabilization.    PLAN:  -Patient requires inpatient hospitalization for stabilization and safety.  - Legal: 2PC  - Safety: Routine obs  - Psych, standing: Risperidone 1mg QD, Remeron 7.5mg QHS. To consider ECT.  - Psych, PRN: Zyprexa 1.25 mg PO/IM PRN agitation  - Medical: Continued med rec, including: amlodipine 2.5 mg po, melatonin 6 mg QHS, pantoprazole 40 mg PO DR daily, PEG 17 gm daily  - Diet: Dash/TLC

## 2020-12-21 NOTE — BH INPATIENT PSYCHIATRY PROGRESS NOTE - MSE UNSTRUCTURED FT
Patient in bed, partially cooperative. Soft speech, has some PMR, no EPS. Mood dysphoric. No SI. Has dense delusion that she is being forced out of her apartment. Believes that entire hospital is staged with actors and the meds are not real. no SI. No voices. Thinking ruminative. Oriented. Nil insight Patient in bed, partially cooperative. Soft speech, has some PMR, no EPS. Mood dysphoric. No SI. Has dense delusion that she is being forced out of her apartment, delusion that nobody is on the unit. Believes that entire hospital is staged with actors and the meds are not real. no SI. No voices. Thinking ruminative. Oriented. Poor insight. Poor judgement.

## 2020-12-22 PROCEDURE — 99232 SBSQ HOSP IP/OBS MODERATE 35: CPT

## 2020-12-22 RX ORDER — MIRTAZAPINE 45 MG/1
15 TABLET, ORALLY DISINTEGRATING ORAL
Refills: 0 | Status: DISCONTINUED | OUTPATIENT
Start: 2020-12-22 | End: 2020-12-28

## 2020-12-22 RX ADMIN — SENNA PLUS 2 TABLET(S): 8.6 TABLET ORAL at 17:19

## 2020-12-22 RX ADMIN — POLYETHYLENE GLYCOL 3350 17 GRAM(S): 17 POWDER, FOR SOLUTION ORAL at 09:18

## 2020-12-22 RX ADMIN — RISPERIDONE 1 MILLIGRAM(S): 4 TABLET ORAL at 09:18

## 2020-12-22 RX ADMIN — MIRTAZAPINE 15 MILLIGRAM(S): 45 TABLET, ORALLY DISINTEGRATING ORAL at 17:19

## 2020-12-22 RX ADMIN — PANTOPRAZOLE SODIUM 40 MILLIGRAM(S): 20 TABLET, DELAYED RELEASE ORAL at 06:36

## 2020-12-22 NOTE — BH INPATIENT PSYCHIATRY PROGRESS NOTE - MSE UNSTRUCTURED FT
Patient in chair in common area, cooperative. Soft speech but stronger than yesterday, has some PMR, no EPS. Mood dysphoric. No SI. Paranoid delusions about people stealing her money. no SI at this time, intermittent passive SI without a plan. No HI. No voices. Thinking ruminative. Oriented. Poor insight. Fair judgement.

## 2020-12-22 NOTE — BH INPATIENT PSYCHIATRY PROGRESS NOTE - NSBHFUPINTERVALHXFT_PSY_A_CORE
Pt seen for follow up depression with psychosis. Chart reviewed, pt adherent to Remeron 7.5mg and Risperdal 1mg.    On interview, pt endorsed continued depressed mood, intermittent passive SI without intent or plan, poor sleep 1-2 hours last night. Said that Remeron helped a bit, discussed plan to increase dose to 15mg today, pt expressed agreement and understanding. Denied side effects of medications. Pt observed eating breakfast and in the common area outside her room this AM. No current SI/HI/AVH elicited.

## 2020-12-22 NOTE — BH INPATIENT PSYCHIATRY PROGRESS NOTE - NSTXPSYCHOINTERMD_PSY_ALL_CORE
This is a is 80 year old  female, , domiciled alone, w/ PPH MDD/Anxiety which started after her ’s death 3 years ago, not in outpt ttx, no hx of inpt psych admissions or suicide attempts, no hx of aggression, violence, legal issues or substance abuse problems, w/ PMH ofHTN, HLD, OAB, Cholelithiasis with acute cholecystitis & Diverticulosis. Patient presented to [facility] on 12/11 with psychosis, admitted to medicine for cystitis and seen by  psychiatry and found to have paranoid delusions of being poisoned resulting in depression, passive SI, and functional impairment of ADLs. Patient now being admitted 2PC to [facility] for management of psychosis. On admission, patient is psychotically paranoid, confused, and endorses depression and passive SI. Given her psychosis is impairing her ability to care for herself, she meets criteria for inpatient psychiatric admission for safety and stabilization.    PLAN:  -Patient requires inpatient hospitalization for stabilization and safety.  - Legal: 2PC  - Safety: Routine obs  - Psych, standing: Risperidone 1mg QD, Remeron 7.5mg QHS. To consider ECT.  - Psych, PRN: Zyprexa 1.25 mg PO/IM PRN agitation  - Medical: Continued med rec, including: amlodipine 2.5 mg po, melatonin 6 mg QHS, pantoprazole 40 mg PO DR daily, PEG 17 gm daily  - Diet: Dash/TLC

## 2020-12-22 NOTE — BH INPATIENT PSYCHIATRY PROGRESS NOTE - NSBHCHARTREVIEWVS_PSY_A_CORE FT
Vital Signs Last 24 Hrs  T(C): 36.8 (22 Dec 2020 06:44), Max: 36.8 (21 Dec 2020 15:00)  T(F): 98.2 (22 Dec 2020 06:44), Max: 98.2 (21 Dec 2020 15:00)  HR: --  BP: --  BP(mean): --  RR: --  SpO2: --

## 2020-12-23 PROBLEM — N39.0 ACUTE UTI: Status: RESOLVED | Noted: 2020-06-05 | Resolved: 2020-12-23

## 2020-12-23 PROBLEM — Z87.440 HISTORY OF URINARY TRACT INFECTION: Status: RESOLVED | Noted: 2020-06-12 | Resolved: 2020-12-23

## 2020-12-23 RX ORDER — RISPERIDONE 4 MG/1
0.5 TABLET ORAL ONCE
Refills: 0 | Status: COMPLETED | OUTPATIENT
Start: 2020-12-23 | End: 2020-12-23

## 2020-12-23 RX ORDER — RISPERIDONE 4 MG/1
1.5 TABLET ORAL DAILY
Refills: 0 | Status: DISCONTINUED | OUTPATIENT
Start: 2020-12-24 | End: 2020-12-24

## 2020-12-23 RX ADMIN — PANTOPRAZOLE SODIUM 40 MILLIGRAM(S): 20 TABLET, DELAYED RELEASE ORAL at 07:22

## 2020-12-23 RX ADMIN — SENNA PLUS 2 TABLET(S): 8.6 TABLET ORAL at 16:09

## 2020-12-23 RX ADMIN — POLYETHYLENE GLYCOL 3350 17 GRAM(S): 17 POWDER, FOR SOLUTION ORAL at 09:05

## 2020-12-23 RX ADMIN — MIRTAZAPINE 15 MILLIGRAM(S): 45 TABLET, ORALLY DISINTEGRATING ORAL at 16:09

## 2020-12-23 RX ADMIN — RISPERIDONE 0.5 MILLIGRAM(S): 4 TABLET ORAL at 12:43

## 2020-12-23 RX ADMIN — RISPERIDONE 1 MILLIGRAM(S): 4 TABLET ORAL at 09:05

## 2020-12-23 NOTE — BH INPATIENT PSYCHIATRY PROGRESS NOTE - NSBHFUPINTERVALHXFT_PSY_A_CORE
Pt seen for follow up depression with psychosis. Chart reviewed, pt adherent to medications, no prns overnight.    On interview this AM pt endorsed depressed and anxious mood, restless sleep 1-2 hours last night as she kept thinking about things she had to do, such as get her home in order, and how to stay warm in the snow when she goes home. Said that her sons contacted her and told her she would be in the hospital long-term, though she felt she would be going home today or tomorrow. Said that nobody is in the hospital, then clarified that people keep leaving the hospital. Denied SI/HI, no AVH elicited. No side effects elicited. Discussed plan to continue to titrate the Remeron and risperidone, with consideration of ECT should it be needed, pt expressed appreciation.

## 2020-12-23 NOTE — BH INPATIENT PSYCHIATRY PROGRESS NOTE - NSBHCHARTREVIEWVS_PSY_A_CORE FT
Vital Signs Last 24 Hrs  T(C): 36.7 (23 Dec 2020 10:40), Max: 37.3 (22 Dec 2020 14:54)  T(F): 98 (23 Dec 2020 10:40), Max: 99.1 (22 Dec 2020 14:54)  HR: --  BP: --  BP(mean): --  RR: 18 (23 Dec 2020 09:54) (18 - 18)  SpO2: 98% (23 Dec 2020 09:54) (98% - 98%)

## 2020-12-23 NOTE — BH INPATIENT PSYCHIATRY PROGRESS NOTE - NSTXPSYCHOINTERMD_PSY_ALL_CORE
This is a is 80 year old  female, , domiciled alone, w/ PPH MDD/Anxiety which started after her ’s death 3 years ago, not in outpt ttx, no hx of inpt psych admissions or suicide attempts, no hx of aggression, violence, legal issues or substance abuse problems, w/ PMH of HTN, HLD, OAB, Cholelithiasis with acute cholecystitis & Diverticulosis. Patient presented to [facility] on 12/11 with psychosis, admitted to medicine for cystitis and seen by  psychiatry and found to have paranoid delusions of being poisoned resulting in depression, passive SI, and functional impairment of ADLs. Patient now being admitted 2PC to [facility] for management of psychosis. On admission, patient is psychotically paranoid, confused, and endorses depression and passive SI. Given her psychosis is impairing her ability to care for herself, she meets criteria for inpatient psychiatric admission for safety and stabilization.    PLAN:  - Legal: 2PC  - Safety: Routine obs  - Psych, standing: Risperidone 1mg QD, Remeron 15mg QHS. To consider ECT.  - Psych, PRN: Zyprexa 1.25 mg PO/IM PRN agitation  - Medical: Continued med rec, including: amlodipine 2.5 mg po, melatonin 6 mg QHS, pantoprazole 40 mg PO DR daily, PEG 17 gm daily  - Diet: Dash/TLC

## 2020-12-23 NOTE — BH INPATIENT PSYCHIATRY PROGRESS NOTE - MSE UNSTRUCTURED FT
Patient in chair in common area, cooperative. Soft speech but stronger than yesterday, has some PMR, no EPS. Mood dysphoric and anxious, affect congruent. No SI. Paranoid delusions about people stealing her money. no SI at this time, intermittent passive SI without a plan. No HI. No voices. Thinking ruminative. Oriented. Poor insight. Limited judgement.

## 2020-12-24 PROCEDURE — 99232 SBSQ HOSP IP/OBS MODERATE 35: CPT

## 2020-12-24 RX ORDER — RISPERIDONE 4 MG/1
2 TABLET ORAL
Refills: 0 | Status: DISCONTINUED | OUTPATIENT
Start: 2020-12-25 | End: 2020-12-29

## 2020-12-24 RX ADMIN — PANTOPRAZOLE SODIUM 40 MILLIGRAM(S): 20 TABLET, DELAYED RELEASE ORAL at 06:49

## 2020-12-24 RX ADMIN — POLYETHYLENE GLYCOL 3350 17 GRAM(S): 17 POWDER, FOR SOLUTION ORAL at 09:41

## 2020-12-24 RX ADMIN — RISPERIDONE 1.5 MILLIGRAM(S): 4 TABLET ORAL at 09:41

## 2020-12-24 RX ADMIN — SENNA PLUS 2 TABLET(S): 8.6 TABLET ORAL at 17:04

## 2020-12-24 RX ADMIN — MIRTAZAPINE 15 MILLIGRAM(S): 45 TABLET, ORALLY DISINTEGRATING ORAL at 17:04

## 2020-12-24 NOTE — BH INPATIENT PSYCHIATRY PROGRESS NOTE - NSBHCHARTREVIEWVS_PSY_A_CORE FT
Vital Signs Last 24 Hrs  T(C): 36.6 (24 Dec 2020 06:34), Max: 36.8 (23 Dec 2020 18:49)  T(F): 97.9 (24 Dec 2020 06:34), Max: 98.3 (23 Dec 2020 18:49)  HR: --  BP: --  BP(mean): --  RR: --  SpO2: --

## 2020-12-24 NOTE — BH INPATIENT PSYCHIATRY PROGRESS NOTE - MSE UNSTRUCTURED FT
Pt seen in bed, cooperative, sat in up bed for interview. Soft speech but stronger than yesterday, has some PMR, no EPS. Mood dysphoric and anxious, affect congruent, less anxious than yesterday. No SI. Paranoid delusions about losing her home, though less fixed. no SI at this time, intermittent passive SI without a plan. No HI. No voices. Thinking ruminative. Oriented. Poor insight. Limited judgement.

## 2020-12-24 NOTE — BH INPATIENT PSYCHIATRY PROGRESS NOTE - NSTXPSYCHOINTERMD_PSY_ALL_CORE
Plan  - Safety: Routine obs  - Psych, standing: Risperidone 2mg at 17:00, Remeron 15mg at 17:00. To consider ECT.  - Psych, PRN: Zyprexa 1.25 mg PO/IM PRN agitation  - Medical: Continued med rec, including: amlodipine 2.5 mg po, melatonin 6 mg QHS, pantoprazole 40 mg PO DR daily, PEG 17 gm daily  - Diet: Dash/TLC

## 2020-12-24 NOTE — BH INPATIENT PSYCHIATRY PROGRESS NOTE - NSBHFUPINTERVALHXFT_PSY_A_CORE
Pt seen for follow up depression with psychosis. Chart reviewed, no prns overnight, pt adherent to medications.    On interview this AM pt endorsed depressed mood, said that she felt better than when she came to the hospital but still felt poorly. Said that she was concerned that her house might be taken away, but was not sure. Said that she sometimes felt that the unit was a stage. Identified the interviewer as her doctor, said that she was in a hospital. No SI/HI/AVH elicited. Denied side effects of medications. Endorsed 2 hours of sleep last night, endorsed restless sleep due to anxiety. Discussed ECT as possible option should medications not provide sufficient efficacy, pt expressed understanding.

## 2020-12-25 PROCEDURE — 99231 SBSQ HOSP IP/OBS SF/LOW 25: CPT

## 2020-12-25 RX ADMIN — PANTOPRAZOLE SODIUM 40 MILLIGRAM(S): 20 TABLET, DELAYED RELEASE ORAL at 06:32

## 2020-12-25 RX ADMIN — RISPERIDONE 2 MILLIGRAM(S): 4 TABLET ORAL at 17:12

## 2020-12-25 RX ADMIN — MIRTAZAPINE 15 MILLIGRAM(S): 45 TABLET, ORALLY DISINTEGRATING ORAL at 17:11

## 2020-12-25 RX ADMIN — SENNA PLUS 2 TABLET(S): 8.6 TABLET ORAL at 17:11

## 2020-12-25 NOTE — BH INPATIENT PSYCHIATRY PROGRESS NOTE - MSE UNSTRUCTURED FT
Pt seen in bed, cooperative, sat in up bed for interview. Soft speech but stronger than yesterday, has some PMR, no EPS. Mood dysphoric and anxious, affect congruent. No SI. Suspicious of examiner, when asked for her name reports "don't you have it, don't you know what it is?" seems suspicious of my need to see her  . no SI at this time, intermittent passive SI without a plan. No HI. No voices. Thinking ruminative. Oriented. Poor insight. Limited judgement.

## 2020-12-25 NOTE — BH INPATIENT PSYCHIATRY PROGRESS NOTE - NSBHCHARTREVIEWVS_PSY_A_CORE FT
Vital Signs Last 24 Hrs  T(C): 36.5 (25 Dec 2020 06:35), Max: 37.1 (24 Dec 2020 21:37)  T(F): 97.7 (25 Dec 2020 06:35), Max: 98.7 (24 Dec 2020 21:37)  HR: --  BP: --  BP(mean): --  RR: --  SpO2: -- Vital Signs Last 24 Hrs  T(C): 36.5 (25 Dec 2020 06:35), Max: 37.1 (24 Dec 2020 21:37)  T(F): 97.7 (25 Dec 2020 06:35), Max: 98.7 (24 Dec 2020 21:37)  HR: --68  BP: --130/67  BP(mean): --  RR: --  SpO2: --

## 2020-12-26 PROCEDURE — 99232 SBSQ HOSP IP/OBS MODERATE 35: CPT

## 2020-12-26 RX ADMIN — POLYETHYLENE GLYCOL 3350 17 GRAM(S): 17 POWDER, FOR SOLUTION ORAL at 09:43

## 2020-12-26 RX ADMIN — RISPERIDONE 2 MILLIGRAM(S): 4 TABLET ORAL at 17:45

## 2020-12-26 RX ADMIN — PANTOPRAZOLE SODIUM 40 MILLIGRAM(S): 20 TABLET, DELAYED RELEASE ORAL at 06:40

## 2020-12-26 RX ADMIN — SENNA PLUS 2 TABLET(S): 8.6 TABLET ORAL at 17:45

## 2020-12-26 RX ADMIN — MIRTAZAPINE 15 MILLIGRAM(S): 45 TABLET, ORALLY DISINTEGRATING ORAL at 17:45

## 2020-12-26 NOTE — BH INPATIENT PSYCHIATRY PROGRESS NOTE - NSBHFUPINTERVALHXFT_PSY_A_CORE
Patient seen for depression, chart reviewed and discussed with nursing staff. Reports feeling tired, depressed and anxious. Sleep is not good as it is very noisy, energy and motivation are weak. Endorses feeling hopeless at times, denies any SI, HI, paranoia.

## 2020-12-26 NOTE — BH INPATIENT PSYCHIATRY PROGRESS NOTE - NSBHCHARTREVIEWVS_PSY_A_CORE FT
Vital Signs Last 24 Hrs  T(C): 36.7 (26 Dec 2020 06:52), Max: 36.7 (25 Dec 2020 12:47)  T(F): 98 (26 Dec 2020 06:52), Max: 98.1 (25 Dec 2020 12:47)  HR: --  BP: --  BP(mean): --  RR: --  SpO2: --

## 2020-12-27 PROCEDURE — 99231 SBSQ HOSP IP/OBS SF/LOW 25: CPT

## 2020-12-27 RX ADMIN — RISPERIDONE 2 MILLIGRAM(S): 4 TABLET ORAL at 16:49

## 2020-12-27 RX ADMIN — MIRTAZAPINE 15 MILLIGRAM(S): 45 TABLET, ORALLY DISINTEGRATING ORAL at 16:48

## 2020-12-27 RX ADMIN — PANTOPRAZOLE SODIUM 40 MILLIGRAM(S): 20 TABLET, DELAYED RELEASE ORAL at 06:46

## 2020-12-27 RX ADMIN — SENNA PLUS 2 TABLET(S): 8.6 TABLET ORAL at 16:49

## 2020-12-27 NOTE — BH INPATIENT PSYCHIATRY PROGRESS NOTE - NSBHFUPINTERVALHXFT_PSY_A_CORE
Patient reports that she continues to be somewhat depressed. Appetite is fair. She is hopeful that meds will begin to work soon.

## 2020-12-27 NOTE — BH INPATIENT PSYCHIATRY PROGRESS NOTE - NSBHCHARTREVIEWVS_PSY_A_CORE FT
Vital Signs Last 24 Hrs  T(C): 36.7 (27 Dec 2020 06:46), Max: 36.8 (26 Dec 2020 18:43)  T(F): 98 (27 Dec 2020 06:46), Max: 98.3 (26 Dec 2020 18:43)  HR: --59  BP: 118/62 (27 Dec 2020 06:46) (118/62 - 118/62)  BP(mean): 59 (27 Dec 2020 06:46) (59 - 59)  RR: --  SpO2: --

## 2020-12-27 NOTE — BH INPATIENT PSYCHIATRY PROGRESS NOTE - MSE UNSTRUCTURED FT
Pt is awake and alert, oriented and in no acute physical distress. Good grooming and hygiene. No agitation/EPS/retardation noted. Cooperative, makes eye contact. Mood is tired, "depressed... anxious," affect is dysphoric and anxious, congruent. Thoughts are linear, denies any SI/HI, no gross delusions expressed now. No hallucination elicited. Insight and judgment are impaired.

## 2020-12-27 NOTE — BH INPATIENT PSYCHIATRY PROGRESS NOTE - NSBHASSESSSUMMFT_PSY_ALL_CORE
Pt remains depressed, no expressed delusions, hopeful that she will start feeling better soon.  Continue mirtazapine and risperidone

## 2020-12-28 PROCEDURE — 99232 SBSQ HOSP IP/OBS MODERATE 35: CPT

## 2020-12-28 RX ORDER — MIRTAZAPINE 45 MG/1
22.5 TABLET, ORALLY DISINTEGRATING ORAL AT BEDTIME
Refills: 0 | Status: DISCONTINUED | OUTPATIENT
Start: 2020-12-28 | End: 2020-12-28

## 2020-12-28 RX ORDER — MIRTAZAPINE 45 MG/1
22.5 TABLET, ORALLY DISINTEGRATING ORAL
Refills: 0 | Status: DISCONTINUED | OUTPATIENT
Start: 2020-12-28 | End: 2020-12-30

## 2020-12-28 RX ORDER — SALIVA SUBSTITUTE COMB NO.11 351 MG
5 POWDER IN PACKET (EA) MUCOUS MEMBRANE
Refills: 0 | Status: DISCONTINUED | OUTPATIENT
Start: 2020-12-28 | End: 2021-01-15

## 2020-12-28 RX ADMIN — RISPERIDONE 2 MILLIGRAM(S): 4 TABLET ORAL at 16:59

## 2020-12-28 RX ADMIN — POLYETHYLENE GLYCOL 3350 17 GRAM(S): 17 POWDER, FOR SOLUTION ORAL at 08:35

## 2020-12-28 RX ADMIN — MIRTAZAPINE 22.5 MILLIGRAM(S): 45 TABLET, ORALLY DISINTEGRATING ORAL at 16:59

## 2020-12-28 RX ADMIN — PANTOPRAZOLE SODIUM 40 MILLIGRAM(S): 20 TABLET, DELAYED RELEASE ORAL at 06:33

## 2020-12-28 RX ADMIN — Medication 5 MILLILITER(S): at 20:22

## 2020-12-28 NOTE — BH INPATIENT PSYCHIATRY PROGRESS NOTE - NSTXDEPRESINTERMD_PSY_ALL_CORE
continue titrating risperdal and remeron, will consider ECT   continue titrating risperdal and remeron, will consider ECT  -Biotene BID for dry mouth

## 2020-12-28 NOTE — BH INPATIENT PSYCHIATRY PROGRESS NOTE - NSTXPSYCHOINTERMD_PSY_ALL_CORE
Plan  - Safety: Routine obs  - Psych, standing: Risperidone 2mg at 17:00, Remeron 22.5mg at 17:00. To consider ECT.  - Psych, PRN: Zyprexa 1.25 mg PO/IM PRN agitation  - Medical: Continued med rec, including: amlodipine 2.5 mg po, melatonin 6 mg QHS, pantoprazole 40 mg PO DR daily, PEG 17 gm daily  - Diet: Dash/TLC

## 2020-12-28 NOTE — BH INPATIENT PSYCHIATRY PROGRESS NOTE - NSBHCHARTREVIEWVS_PSY_A_CORE FT
Vital Signs Last 24 Hrs  T(C): 37 (28 Dec 2020 07:01), Max: 37 (28 Dec 2020 07:01)  T(F): 98.6 (28 Dec 2020 07:01), Max: 98.6 (28 Dec 2020 07:01)  HR: 63 (28 Dec 2020 07:01) (63 - 63)  BP: 144/57 (28 Dec 2020 07:01) (144/57 - 144/57)  BP(mean): --  RR: --  SpO2: --

## 2020-12-28 NOTE — BH INPATIENT PSYCHIATRY PROGRESS NOTE - MSE UNSTRUCTURED FT
Pt is awake and alert, oriented and in no acute physical distress. Fair grooming and hygiene. No agitation/EPS/retardation noted. Cooperative, makes eye contact. Mood is tired, poor, affect is dysphoric and anxious, congruent. Thoughts are linear, denies any SI/HI, no gross delusions expressed now. No hallucinationselicited. Insight and judgment are impaired.

## 2020-12-28 NOTE — BH INPATIENT PSYCHIATRY PROGRESS NOTE - NSBHFUPINTERVALHXFT_PSY_A_CORE
Pt seen for follow up depression with psychosis. Chart reviewed, pt adherent to medications, no prns overnight.    Pt seen in bed when approached for interview this AM. Pt stated that she was feeling poorly, endorsed sleeping though did not know how much, said that she was concerned that she would not be able to get home. Said that someone might take her home. Said that she did not feel that the unit was a stage, though sometimes felt that way. Denied SI/HI. Endorsed dry mouth, otherwise denied side effects of medications. Discussed ECT as treatment option, pt stated that she wanted to continue with medications and did not want ECT, did not state reason for no wanting ECT despite repeated questioning.

## 2020-12-29 LAB
A1C WITH ESTIMATED AVERAGE GLUCOSE RESULT: 5.5 % — SIGNIFICANT CHANGE UP (ref 4–5.6)
CHOLEST SERPL-MCNC: 209 MG/DL — HIGH
ESTIMATED AVERAGE GLUCOSE: 111 MG/DL — SIGNIFICANT CHANGE UP (ref 68–114)
HDLC SERPL-MCNC: 33 MG/DL — LOW
LIPID PNL WITH DIRECT LDL SERPL: 100 MG/DL — HIGH
NON HDL CHOLESTEROL: 176 MG/DL — HIGH
TRIGL SERPL-MCNC: 380 MG/DL — HIGH

## 2020-12-29 PROCEDURE — 99232 SBSQ HOSP IP/OBS MODERATE 35: CPT

## 2020-12-29 RX ORDER — RISPERIDONE 4 MG/1
2.5 TABLET ORAL
Refills: 0 | Status: DISCONTINUED | OUTPATIENT
Start: 2020-12-29 | End: 2021-01-05

## 2020-12-29 RX ADMIN — SENNA PLUS 2 TABLET(S): 8.6 TABLET ORAL at 16:55

## 2020-12-29 RX ADMIN — Medication 5 MILLILITER(S): at 09:51

## 2020-12-29 RX ADMIN — RISPERIDONE 2.5 MILLIGRAM(S): 4 TABLET ORAL at 16:55

## 2020-12-29 RX ADMIN — PANTOPRAZOLE SODIUM 40 MILLIGRAM(S): 20 TABLET, DELAYED RELEASE ORAL at 06:33

## 2020-12-29 RX ADMIN — Medication 5 MILLILITER(S): at 21:13

## 2020-12-29 RX ADMIN — MIRTAZAPINE 22.5 MILLIGRAM(S): 45 TABLET, ORALLY DISINTEGRATING ORAL at 16:55

## 2020-12-29 RX ADMIN — POLYETHYLENE GLYCOL 3350 17 GRAM(S): 17 POWDER, FOR SOLUTION ORAL at 09:51

## 2020-12-29 NOTE — BH INPATIENT PSYCHIATRY PROGRESS NOTE - MSE UNSTRUCTURED FT
Pt is awake and alert, oriented and in no acute physical distress. Fair grooming and hygiene. No agitation/EPS/retardation noted. Cooperative, makes eye contact. Mood is tired, better, affect is dysphoric, anxious, congruent, constricted. Thoughts are linear, denies any SI/HI, no gross delusions expressed now, paranoid perseveration on loss of control about being in the hospital. No hallucinations elicited. Insight and judgment are impaired.

## 2020-12-29 NOTE — BH INPATIENT PSYCHIATRY PROGRESS NOTE - NSBHFUPINTERVALHXFT_PSY_A_CORE
Pt seen for follow up depression with psychosis. Chart reviewed, pt adherent to meds except senna, no prns overnight.    On interview this AM pt endorsed improved sleep last night, denied side effects of meds. Said that she was feeling a bit better. Said that her sons would not take her home right now, endorsed feeling loss of control, perseverated on this. Discussed possibility for ECT if when meds are optimized pt still is not well, pt expressed agreement that she would consider ECT in that event. Pt in agreement with plan to continue titration of meds for now. Denied SIIP/HIIP, no AVH elicited.

## 2020-12-29 NOTE — BH SCALES AND SCREENS - NSBPRSCONCDISORG_PSY_ALL_CORE
3 = Mild - e.g., frequently vague, but the interview is able to progress smoothly
5 = Moderately Severe - as above, but more frequent

## 2020-12-29 NOTE — BH SCALES AND SCREENS - NSBPRSSUSPIC_PSY_ALL_CORE
4 = Moderate – more frequent suspiciousness, or transient ideas of reference
6 = Severe – definite, delusion(s) of reference or persecution that is (are) not wholly pervasive (e.g., an encapsulated delusion)

## 2020-12-29 NOTE — BH INPATIENT PSYCHIATRY PROGRESS NOTE - NSBHCHARTREVIEWVS_PSY_A_CORE FT
Vital Signs Last 24 Hrs  T(C): 36.9 (29 Dec 2020 06:47), Max: 36.9 (28 Dec 2020 19:01)  T(F): 98.4 (29 Dec 2020 06:47), Max: 98.5 (28 Dec 2020 19:01)  HR: --  BP: --  BP(mean): --  RR: 18 (29 Dec 2020 07:51) (18 - 18)  SpO2: --

## 2020-12-29 NOTE — BH SCALES AND SCREENS - NSBPRSBLUNTAFFECT_PSY_ALL_CORE
2 = Very Mild – e.g., occasionally seems indifferent to material that is usually accompanied by some show of emotion
2 = Very Mild – e.g., occasionally seems indifferent to material that is usually accompanied by some show of emotion

## 2020-12-29 NOTE — BH INPATIENT PSYCHIATRY PROGRESS NOTE - NSBHASSESSSUMMFT_PSY_ALL_CORE
Pt remains depressed, anxious and perseverating, + paranoid delusions, hopeful that she will start feeling better soon.  Continue mirtazapine and risperidone titrations

## 2020-12-29 NOTE — BH INPATIENT PSYCHIATRY PROGRESS NOTE - NSTXPSYCHOINTERMD_PSY_ALL_CORE
Plan  - Safety: Routine obs  - Psych, standing: Risperidone 2.5mg at 17:00, Remeron 22.5mg at 17:00. To consider ECT.  - Psych, PRN: Zyprexa 1.25 mg PO/IM PRN agitation  - Medical: Continued med rec, including: amlodipine 2.5 mg po, melatonin 6 mg QHS, pantoprazole 40 mg PO DR daily, PEG 17 gm daily  - Diet: Dash/TLC

## 2020-12-30 PROCEDURE — 99232 SBSQ HOSP IP/OBS MODERATE 35: CPT

## 2020-12-30 RX ORDER — MIRTAZAPINE 45 MG/1
30 TABLET, ORALLY DISINTEGRATING ORAL
Refills: 0 | Status: DISCONTINUED | OUTPATIENT
Start: 2020-12-30 | End: 2020-12-31

## 2020-12-30 RX ADMIN — Medication 5 MILLILITER(S): at 21:37

## 2020-12-30 RX ADMIN — MIRTAZAPINE 30 MILLIGRAM(S): 45 TABLET, ORALLY DISINTEGRATING ORAL at 16:45

## 2020-12-30 RX ADMIN — Medication 5 MILLILITER(S): at 08:21

## 2020-12-30 RX ADMIN — RISPERIDONE 2.5 MILLIGRAM(S): 4 TABLET ORAL at 16:46

## 2020-12-30 RX ADMIN — PANTOPRAZOLE SODIUM 40 MILLIGRAM(S): 20 TABLET, DELAYED RELEASE ORAL at 06:31

## 2020-12-30 RX ADMIN — POLYETHYLENE GLYCOL 3350 17 GRAM(S): 17 POWDER, FOR SOLUTION ORAL at 08:21

## 2020-12-30 RX ADMIN — SENNA PLUS 2 TABLET(S): 8.6 TABLET ORAL at 16:45

## 2020-12-30 NOTE — BH INPATIENT PSYCHIATRY PROGRESS NOTE - NSTXPSYCHOINTERMD_PSY_ALL_CORE
Plan  - Safety: Routine obs  - Psych, standing: Risperidone 2.5mg at 17:00, Remeron 22.5mg at 17:00. To consider ECT.  - Psych, PRN: Zyprexa 1.25 mg PO/IM PRN agitation  - Medical: Continued med rec, including: melatonin 6 mg QHS, pantoprazole 40 mg PO DR daily, PEG 17 gm daily  - Diet: Dash/TLC Plan  - Safety: Routine obs  - Psych, standing: Risperidone 2.5mg at 17:00, Remeron 30mg at 17:00. To consider ECT.  - Psych, PRN: Zyprexa 1.25 mg PO/IM PRN agitation  - Medical: Continued med rec, including: melatonin 6 mg QHS, pantoprazole 40 mg PO DR daily, PEG 17 gm daily  - Diet: Dash/TLC

## 2020-12-30 NOTE — BH INPATIENT PSYCHIATRY PROGRESS NOTE - NSBHFUPINTERVALHXFT_PSY_A_CORE
Pt seen for follow up depression with psychosis. Chart reviewed, pt adherent to meds, no prns overnight.    On interview this AM, pt stated that she is feeling better, had good sleep last night. Pt perseverated on what might be happening to her home, and expressed concerns about pipes freezing. Said that she would not be able to go home on her own, and would need her sons to pick her up, perseverating on this point. Denied side effects of medications. No SIIP/HIIP/AVH elicited.

## 2020-12-30 NOTE — BH INPATIENT PSYCHIATRY PROGRESS NOTE - NSBHCHARTREVIEWVS_PSY_A_CORE FT
Vital Signs Last 24 Hrs  T(C): 36.8 (30 Dec 2020 06:43), Max: 37.1 (29 Dec 2020 14:52)  T(F): 98.3 (30 Dec 2020 06:43), Max: 98.8 (29 Dec 2020 14:52)  HR: --  BP: --  BP(mean): --  RR: --  SpO2: --

## 2020-12-30 NOTE — BH INPATIENT PSYCHIATRY PROGRESS NOTE - MSE UNSTRUCTURED FT
Pt is awake and alert oriented and in no acute physical distress. Fair grooming and hygiene. No agitation/EPS/retardation noted. Cooperative, makes eye contact. Mood is tired, better, affect is dysphoric, anxious, congruent, constricted. Thoughts are linear, denies any SI/HI, no gross delusions expressed now, paranoid perseveration on not being able to go home on her home, and fears about what may be happening to her house. No hallucinations elicited. Insight and judgment are impaired.

## 2020-12-30 NOTE — BH INPATIENT PSYCHIATRY PROGRESS NOTE - NSTXCOPEINTERMD_PSY_ALL_CORE
risperidone 2.5mg at 17:00, Remeron 22.5mg at 17:00 15mg, push fluids risperidone 2.5mg at 17:00, Remeron increase to 30mg at 17:00 15mg, push fluids

## 2020-12-30 NOTE — BH CHART NOTE - NSEVENTNOTEFT_PSY_ALL_CORE
Called son Altaf [phone]: He spoke with pt yesterday. Altaf said that he noticed pt sounded a little bit better from initial hospitalization, in that she did not spend as much time talking about paranoia regarding her home. Was perseverating on need for clothes, though perseverations overall were less than before hospitalization. Pt seemed a bit brighter, was not as down. Pt seemed to be accepting of medications.
Called makenzie Solitario [phone]: Updated on patient's progress and discussed plan for titration of risperidone and Remeron with option for ECT should medication optimization not be sufficiently effective, Altaf expressed agreement and understanding.

## 2020-12-31 PROCEDURE — 99232 SBSQ HOSP IP/OBS MODERATE 35: CPT

## 2020-12-31 RX ORDER — MIRTAZAPINE 45 MG/1
37.5 TABLET, ORALLY DISINTEGRATING ORAL
Refills: 0 | Status: DISCONTINUED | OUTPATIENT
Start: 2020-12-31 | End: 2021-01-04

## 2020-12-31 RX ADMIN — Medication 5 MILLILITER(S): at 20:48

## 2020-12-31 RX ADMIN — RISPERIDONE 2.5 MILLIGRAM(S): 4 TABLET ORAL at 16:10

## 2020-12-31 RX ADMIN — PANTOPRAZOLE SODIUM 40 MILLIGRAM(S): 20 TABLET, DELAYED RELEASE ORAL at 06:30

## 2020-12-31 RX ADMIN — MIRTAZAPINE 37.5 MILLIGRAM(S): 45 TABLET, ORALLY DISINTEGRATING ORAL at 16:09

## 2020-12-31 NOTE — BH INPATIENT PSYCHIATRY PROGRESS NOTE - NSBHANTIPSYCHOTIC_PSY_ALL_CORE_FT
Lipid profile and A1c ordered for tomorrow AM

## 2020-12-31 NOTE — BH TREATMENT PLAN - NSTXFALLINTERRN_PSY_ALL_CORE
Patient was seen by Physical Therapy today. Recoomended ambulation with 1 assist. Explained to the patient to ask for help when ambulating.
Ensure universal fall prevention protocol

## 2020-12-31 NOTE — BH TREATMENT PLAN - NSTXPSYCHOINTERRN_PSY_ALL_CORE
Reorient and Redirect as needed. Encourage adherence with medication regimen. Encourage use of PRN medications as needed.
Provide support as needed

## 2020-12-31 NOTE — BH INPATIENT PSYCHIATRY PROGRESS NOTE - NSBHASSESSSUMMFT_PSY_ALL_CORE
Pt remains depressed, low energy, less anxious and decreased delusional thinking at this moment, hopeful that she will start feeling better soon.  Continue mirtazapine and risperidone titrations

## 2020-12-31 NOTE — BH TREATMENT PLAN - NSTXDEPRESINTERRN_PSY_ALL_CORE
Encourage patient  to verbalize feelings to staff and offer support
Encourage Pt to verbalize feelings to staff and seek out staff if in crisis

## 2020-12-31 NOTE — BH TREATMENT PLAN - NSTXDCOTHRINTERSW_PSY_ALL_CORE
SW meets with pt routinely for ongoing support and assessment.  Writer has regular contact with pt's family to coordinate a supportive DC for pt.
SW will meet with pt offering psychoeducation, and encouraging her participation in treatment.

## 2020-12-31 NOTE — BH INPATIENT PSYCHIATRY PROGRESS NOTE - NSBHCHARTREVIEWVS_PSY_A_CORE FT
Vital Signs Last 24 Hrs  T(C): 36.9 (31 Dec 2020 06:49), Max: 37.1 (30 Dec 2020 21:58)  T(F): 98.4 (31 Dec 2020 06:49), Max: 98.7 (30 Dec 2020 21:58)  HR: --  BP: --  BP(mean): --  RR: --  SpO2: --

## 2020-12-31 NOTE — BH INPATIENT PSYCHIATRY PROGRESS NOTE - MSE UNSTRUCTURED FT
Pt is awake and alert oriented and in no acute physical distress. Fair grooming and hygiene. No agitation/EPS/retardation noted. Cooperative, makes eye contact. Mood is depressed, better, affect is dysphoric, less anxious, congruent, constricted. Thoughts are linear, denies any SI/HI, no gross delusions expressed now. No hallucinations elicited. Insight and judgment are impaired, improving.

## 2020-12-31 NOTE — BH INPATIENT PSYCHIATRY PROGRESS NOTE - NSBHFUPINTERVALHXFT_PSY_A_CORE
Pt seen for follow up depression with psychosis. Chart reviewed, pt refused miralax, biotene, otherwise adherent to meds no prns.    On interview this AM pt endorsed continued depressed mood, improved from yesterday. Endorsed continued low energy. Said that she used to think that the unit was a stage, but now thinks that everyone is real and wants to help her. Said that she is ok with her sons managing her house, and does not have concerns now about losing her home. Endorsed good PO intake, fair sleep. No SIIP/HIIP elicited, no AVH elicited. Denied side effects of medications.

## 2020-12-31 NOTE — BH TREATMENT PLAN - NSTXCOPEINTERRN_PSY_ALL_CORE
Encourage patient to verbalize thoughts and feelings.  Encourage patient to seek out staff.
Encourage patient to verbalize thoughts and feelings.  Encourage patient to seek out staff.  Encourage patient to take an active part in self care.

## 2020-12-31 NOTE — BH INPATIENT PSYCHIATRY PROGRESS NOTE - NSTXPSYCHOINTERMD_PSY_ALL_CORE
Plan  - Safety: Routine obs  - Psych, standing: Risperidone 2.5mg at 17:00, Remeron 37.5mg at 17:00. To consider ECT.  - Psych, PRN: Zyprexa 1.25 mg PO/IM PRN agitation  - Medical: Continued med rec, including: melatonin 6 mg QHS, pantoprazole 40 mg PO DR daily, PEG 17 gm daily  - Diet: Dash/TLC

## 2020-12-31 NOTE — BH TREATMENT PLAN - NSTXSLFCREINTERRN_PSY_ALL_CORE
Assist with ADL's as needed
Staff assisted Pt with showering. Pt was resistive at first, but staff was able to verbally direct patient, and shower her.

## 2021-01-01 RX ADMIN — MIRTAZAPINE 37.5 MILLIGRAM(S): 45 TABLET, ORALLY DISINTEGRATING ORAL at 16:48

## 2021-01-01 RX ADMIN — Medication 5 MILLILITER(S): at 20:28

## 2021-01-01 RX ADMIN — PANTOPRAZOLE SODIUM 40 MILLIGRAM(S): 20 TABLET, DELAYED RELEASE ORAL at 06:31

## 2021-01-01 RX ADMIN — SENNA PLUS 2 TABLET(S): 8.6 TABLET ORAL at 16:48

## 2021-01-01 RX ADMIN — RISPERIDONE 2.5 MILLIGRAM(S): 4 TABLET ORAL at 16:49

## 2021-01-02 RX ADMIN — Medication 5 MILLILITER(S): at 09:11

## 2021-01-02 RX ADMIN — PANTOPRAZOLE SODIUM 40 MILLIGRAM(S): 20 TABLET, DELAYED RELEASE ORAL at 06:38

## 2021-01-02 RX ADMIN — Medication 5 MILLILITER(S): at 20:45

## 2021-01-02 RX ADMIN — Medication 650 MILLIGRAM(S): at 18:04

## 2021-01-02 RX ADMIN — Medication 650 MILLIGRAM(S): at 17:36

## 2021-01-02 RX ADMIN — MIRTAZAPINE 37.5 MILLIGRAM(S): 45 TABLET, ORALLY DISINTEGRATING ORAL at 17:30

## 2021-01-02 RX ADMIN — POLYETHYLENE GLYCOL 3350 17 GRAM(S): 17 POWDER, FOR SOLUTION ORAL at 09:11

## 2021-01-02 RX ADMIN — RISPERIDONE 2.5 MILLIGRAM(S): 4 TABLET ORAL at 17:29

## 2021-01-02 RX ADMIN — SENNA PLUS 2 TABLET(S): 8.6 TABLET ORAL at 17:29

## 2021-01-03 RX ADMIN — Medication 5 MILLILITER(S): at 09:57

## 2021-01-03 RX ADMIN — SENNA PLUS 2 TABLET(S): 8.6 TABLET ORAL at 17:10

## 2021-01-03 RX ADMIN — MIRTAZAPINE 37.5 MILLIGRAM(S): 45 TABLET, ORALLY DISINTEGRATING ORAL at 17:10

## 2021-01-03 RX ADMIN — Medication 5 MILLILITER(S): at 21:41

## 2021-01-03 RX ADMIN — PANTOPRAZOLE SODIUM 40 MILLIGRAM(S): 20 TABLET, DELAYED RELEASE ORAL at 06:37

## 2021-01-03 RX ADMIN — RISPERIDONE 2.5 MILLIGRAM(S): 4 TABLET ORAL at 17:10

## 2021-01-04 PROCEDURE — 99232 SBSQ HOSP IP/OBS MODERATE 35: CPT

## 2021-01-04 RX ORDER — MIRTAZAPINE 45 MG/1
45 TABLET, ORALLY DISINTEGRATING ORAL
Refills: 0 | Status: DISCONTINUED | OUTPATIENT
Start: 2021-01-04 | End: 2021-01-15

## 2021-01-04 RX ADMIN — MIRTAZAPINE 45 MILLIGRAM(S): 45 TABLET, ORALLY DISINTEGRATING ORAL at 16:03

## 2021-01-04 RX ADMIN — RISPERIDONE 2.5 MILLIGRAM(S): 4 TABLET ORAL at 16:03

## 2021-01-04 RX ADMIN — PANTOPRAZOLE SODIUM 40 MILLIGRAM(S): 20 TABLET, DELAYED RELEASE ORAL at 06:38

## 2021-01-04 RX ADMIN — Medication 5 MILLILITER(S): at 22:02

## 2021-01-04 NOTE — BH INPATIENT PSYCHIATRY DISCHARGE NOTE - HPI (INCLUDE ILLNESS QUALITY, SEVERITY, DURATION, TIMING, CONTEXT, MODIFYING FACTORS, ASSOCIATED SIGNS AND SYMPTOMS)
This is a is 80 year old  female, , domiciled alone, w/ PPH MDD/Anxiety which started after her ’s death 3 years ago, not in outpt ttx, no hx of inpt psych admissions or suicide attempts, no hx of aggression, violence, legal issues or substance abuse problems, w/ PMH ofHTN, HLD, OAB, Cholelithiasis with acute cholecystitis & Diverticulosis. Patient presented to [facility] on 12/11 with psychosis, admitted to medicine for cystitis and seen by  psychiatry and found to have paranoid delusions of being poisoned resulting in depression, passive SI, and functional impairment of ADLs. Patient now being admitted 2PC to [facility] 2S for management of psychosis.     Patient seen on floor. History limited as patient is confused and is poor historian. On interview, pt AAOx2 (person and time, but says place is “a hospital for illegal people”). Patient reports she is here because she was depressed. Patient asks writer, “where will I go from here?” and states her main concern is “I don’t have a place to live anymore.”  Endorses feeling depressed, poor sleep, and passive SI of not wanting to live, denies I/I/P, HI, thoughts of self-harm. She also endorses feeling confused. She repeatedly asks writer admits to feeling unsafe on unit, stating “I’m afraid of falling, afraid of getting sick.” Pt states she suspects none of the patients on the units were given beds. Provided writer contact for her sons, stating “do you think their numbers are the same if I use these phones?”        HPI Per [facility] ED Behavioral Health Assessment Note, authored on 12/11/20:    " HPI  Reason for referral: Psychosis  CC: “I got upset”    Patient is 80 year old   female currently residing alone. PPH MDD/Anxiety after her husbands death 3 years ago. No hx of inpatient admissions or suicide attempts. No hx of aggression, violence, legal issues or substance abuse problems. PMH-HTN, HLD, OAB, Cholelithiasis with acute cholecystitis & Diverticulosis. Patient admitted to [facility] ED on 12/16 for new onset psychosis.     Patient reports she came to the ED because her son made her come. She reports last night she got upset and her son said she was "psychotic." Patient reports she stayed with her son last night and she got angry and was yelling and screaming. She reports she was "letting out steam," because she is frustrated and worried over finances. She stated she is at risk to lose her condo and has to figure out where to live, what to do with furniture and has no money to buy food. She stated she cannot sleep (chronic issue) and so has not been driving. She stated her sons have not been helping her solve this problem with her belongings and home and so she screamed because she was upset.    Patient reports she told them "I wish I was dead." She stated she says this sometimes out frustration and "I want their attention so they'll help me." She admits to pounding on her legs because she was angry but denies intent to harm self. She endorses other passive suicidal thoughts stating there is no point and she doesn't do anything with her life. She denies active SI/intent/plan    She stated she collects social security and normally gets $1700. End of October she started charging things and then got a letter from the bank stating they were going online vs. paper statements. She reports she closed her savings account due to fear of her money being taken and believes it was taken because she did not pay all the money on the charge card. Patient reports now she thinks she will lose her condo because she can't afford it due to her lack of money. She reports not eating due to trying to save money "so I can stay in my condo."  Then patient recanted stating she is not eating because they poison her food and put yellow specks in it which cause constipation. She thinks this is all related to the bank. Patient reports she also thinks her cell phone and home are tapped. She stated her downstairs neighbors are some how involved and are monitoring her. She refused to provide information regarding the extent or reason for their involvement stating "I should really just tell the authorities." She reports not showering daily because she is afraid she'll fall and not changing her clothes daily because "I'm comfortable."     Patient reports feeling sad/depressed over her finances. She endorses chronic issues with sleep and poor appetite. She endorses hopelessness about her living situation and anxiety.     Patient denies any hallucinations & denies thought insertion/withdrawal. Patient does not report nor exhibit any signs of familia, including irritable or elevated mood, grandiosity, pressured speech, risk-taking behaviors, increase in productivity or agitation. Patient adamantly denies SI, intent or plan; denies any HI, violent thoughts.     Spoke with NOLA Gabriel ([phone])- Patient was referred by MD Dr. Villa. Patient was referred because 1-2 months ago there was an abrupt change in her behavior. Patient's son said patient thought she would hurt herself. She is delusional and paranoid. Patient thinks she is being poisoned and there are yellow specks in her food and they keep her from moving her bowels. Patient is self disimpacting herself because she doesn't think she can go. She thinks she is being watched. She states the police are coming to take her away due to bad things she has done in her past but refuses to state what was done. Until a few months ago the patient was driving and living independently. She has no hx of drug or alcohol use. She has chronic insomnia issues and has had multiple prescription and OTC medication. She has developed some stuttering in the last few months. MMSE and she scored 27/30. Patient has "abrupt onset psychosis." They tried to send [facility] but they were closed. Daughter in law called this AM stating patient had several outbursts last night and seemed worse so they directed her to [facility].  "

## 2021-01-04 NOTE — BH INPATIENT PSYCHIATRY PROGRESS NOTE - MSE UNSTRUCTURED FT
Pt is awake and alert oriented and in no acute physical distress. Fair grooming and hygiene. No agitation/EPS/retardation noted. Cooperative, makes eye contact. Mood is a little down, anxious, affect is dysphoric, anxious, congruent, constricted. Thoughts are linear, denies any SI/HI, no gross delusions expressed now. No hallucinations elicited. Insight and judgment are impaired, improving.

## 2021-01-04 NOTE — BH INPATIENT PSYCHIATRY DISCHARGE NOTE - NSBHMETABOLIC_PSY_ALL_CORE_FT
BMI: BMI (kg/m2): 21.9 (01-02-21 @ 16:07)  HbA1c: A1C with Estimated Average Glucose Result: 5.5 % (12-29-20 @ 10:19)    Glucose: POCT Blood Glucose.: 99 mg/dL (12-01-20 @ 14:23)    BP: 139/52 (01-03-21 @ 08:14) (139/52 - 139/52)  Lipid Panel: Date/Time: 12-29-20 @ 10:19  Cholesterol, Serum: 209  Direct LDL: --  HDL Cholesterol, Serum: 33  Total Cholesterol/HDL Ration Measurement: --  Triglycerides, Serum: 380

## 2021-01-04 NOTE — BH INPATIENT PSYCHIATRY PROGRESS NOTE - NSBHASSESSSUMMFT_PSY_ALL_CORE
Pt remains depressed, anxious, with decreased delusional thinking at this moment, hopeful that she will start feeling better soon.  Continue mirtazapine and risperidone titrations.    Pt continues to require inpatient hospitalization for further stabilization of depressive symptoms.

## 2021-01-04 NOTE — BH INPATIENT PSYCHIATRY PROGRESS NOTE - NSBHROSSYSTEMS_PSY_ALL_CORE
Psychiatric

## 2021-01-04 NOTE — BH INPATIENT PSYCHIATRY DISCHARGE NOTE - HOSPITAL COURSE
Pt was admitted to [facility] on a 9.27 status starting 12/16/2020.    Upon initial hospitalization, pt was mostly lying in bed, speaking very softly, perseverating on losing her home and thinking that the unit was a stage and nobody here was real, with depressed mood. Pt received mirtazapine, up-titrated to 45mg QHS, and risperidone, up-titrated to 2.5mg QHS. On these meds pt became more mobile, was more visible on the unit, and paranoid delusions improved. However, pt remained depressed, anxious, and perseverated on anxious thoughts about her financial stressors. Per discussion with pt's sons Altaf, Khoi, and pt herself, pt was started on a course of ECT for depression with psychosis _______????        Risk Assessment:  Acute risk factors include recent depression with psychosis, addressed by inpatient hospitalization. Chronic risk factors include elderly, no prior connection with outpatient care. Protective factors include no SIIP/NSSIIP/HIIP in weeks preceding discharge, no prior psych hosp, no Hx SAs, help-seeking, supportive family. Overall, the pt is at a low acute risk for self-injurious or aggressive behavior, and no longer requires continued inpatient psych hosp at this time. Pt was admitted to [facility] on a 9.27 status starting 12/16/2020 after brief medical admission to [facility] where no medical cause was found for patient's worsening mood and psychotic symptoms.    Upon initial hospitalization, pt was mostly lying in bed, speaking very softly, perseverating on losing her home and thinking that the unit was a stage and nobody here was real, with depressed mood. She was worried about finances and her insurance, thinking her hospital stay was not going to be covered, and asking to leave the hospital because of this. She also endorsed thinking that due to a bill she believed she had paid fraudently, that the 'authorities' were going to punish her, in some way. She initially thought medications that were given to her, were sham medication, and alluded to thinking that the hospital food was poisoned. She exhibited psychomotor slowing, was not sleeping, had a poor appetite, and was very isolative and withdrawn. Given h/o hyponatremia, SSRI and SNRI medications were not used, and pt received mirtazapine, up-titrated to 45mg QHS. For psychotic symptoms, risperidone was started and up-titrated to 4mg QHS. There was a discussion with the patient and her family, regarding ECT as a treatment option for MDD with psychotic features, however the pt declined. Over time on medication, pt's mood and psychotic symptoms greatly improved.  On these meds pt became more mobile, was more visible on the unit, and paranoid delusions improved. She no longer spontaneously talked about worrying about finances, didn't think she would be evicted from her condo, and trusted that her children were paying her bills, while she was hospitalized. Affect became bright, reactive and appropriate and psychomotor slowing remitted. Pt's sons were involved in her care and felt that by discharged, pt sounded much more like herself , and at her prior baseline. Given overall improvement in symptoms and positive treatment response including consistently denying SI/HI in days prior to discharge, pt is now stable for discharge home, with outpatient follow up. Pt and her family are agreeable with the d/c plan.    Risk Assessment:  Acute risk factors include recent depression with psychosis, addressed by inpatient hospitalization. Chronic risk factors include elderly. Protective factors include no SIIP/NSSIIP/HIIP in weeks preceding discharge, no prior psych hosp, no Hx SAs, help-seeking, supportive family. Overall, the pt is at a low acute risk for self-injurious or aggressive behavior, and no longer requires continued inpatient psych hosp at this time.

## 2021-01-04 NOTE — BH INPATIENT PSYCHIATRY DISCHARGE NOTE - REASON FOR ADMISSION
You came to the hospital feeling depressed, anxious, and with fears and beliefs that were out of proportion to reality, such as your concern about the unit not being real, or other concerns regarding your home. You were given mirtazapine (Remeron), increased to 45mg, to help with depression, sleep, appetite and anxiety. You were given risperidone to help with the thoughts that were out of proportion to reality. When the medications were optimized, you were still having difficulty with depression and anxiety, so ECT ____________________ Depression and anxiety

## 2021-01-04 NOTE — BH INPATIENT PSYCHIATRY PROGRESS NOTE - NSTXPSYCHOINTERMD_PSY_ALL_CORE
Plan  - Safety: Routine obs  - Psych, standing: Risperidone 2.5mg at 17:00, Remeron 45mg at 17:00. To consider ECT.  - Psych, PRN: Zyprexa 1.25 mg PO/IM PRN agitation  - Medical: Continued med rec, including: melatonin 6 mg QHS, pantoprazole 40 mg PO DR daily, PEG 17 gm daily  - Diet: Dash/TLC

## 2021-01-04 NOTE — BH INPATIENT PSYCHIATRY DISCHARGE NOTE - NSBHDCMEDICALFT_PSY_A_CORE
GERD: pantoprazole 40mg QD  Depression: Remeron 45mg QHS  Psychosis: risperidone 2.5mg QHS  Dry mouth: biotene  Constipation: senna and miralax Pt taken off of BP meds due to normal BP.   Dry mouth - was given biotene mouth wash

## 2021-01-04 NOTE — BH INPATIENT PSYCHIATRY PROGRESS NOTE - NSBHFUPINTERVALHXFT_PSY_A_CORE
Pt seen for follow up depression with psychosis. Chart reviewed, pt adherent to meds except miralax, biotene.    Pt seen in bed this AM. On interview, pt endorsed poor sleep due to anxiety, ok appetite, mood still down and anxious. Said that she was concerned about paying her medical bills and her house bills. Said that she felt she had to pretend to her children that everything is ok so that she is not a burden on them. Denied SI/HI, no AVH elicited. Said that she does not now think that the unit is a stage. Denied side effects of meds. Discussed plan to increase Remeron to 45mg tonight, pt expressed understanding and agreement. Discussed plan to reconsider ECT by the end of the week should meds not help sufficiently, pt expressed understanding and agreement.

## 2021-01-04 NOTE — BH INPATIENT PSYCHIATRY DISCHARGE NOTE - NSDCMRMEDTOKEN_GEN_ALL_CORE_FT
amLODIPine 2.5 mg oral tablet: 1 tab(s) orally once a day  melatonin 3 mg oral tablet: 2 tab(s) orally once a day (at bedtime)  OLANZapine: 1.25 milligram(s) orally once a day, As Needed for agitation  pantoprazole 40 mg oral delayed release tablet: 1 tab(s) orally once a day (before a meal)  polyethylene glycol 3350 oral powder for reconstitution: 17 gram(s) orally once a day  risperiDONE 0.5 mg oral tablet: 1 tab(s) orally once a day (at bedtime)  senna oral tablet: 2 tab(s) orally once a day (at bedtime)   Biotene Mouthwash oral solution: 15 milliliter(s) orally 3 times a day, As Needed  mirtazapine 45 mg oral tablet: 1 tab(s) orally once a day (at bedtime)   risperiDONE 4 mg oral tablet: 1 tab(s) orally once a day (at bedtime)

## 2021-01-04 NOTE — BH INPATIENT PSYCHIATRY DISCHARGE NOTE - NSDCCPCAREPLAN_GEN_ALL_CORE_FT
PRINCIPAL DISCHARGE DIAGNOSIS  Diagnosis: MDD (major depressive disorder), single episode, severe with psychotic features  Assessment and Plan of Treatment: You had been feeling depressed, with negative thoughts that were out of proportion to reality. You were started on the medications mirtazipine (Remeron) and risperidone. However, even at maximum doses, the medications did not sufficiently address your depression, so you were given ECT ____________????      SECONDARY DISCHARGE DIAGNOSES  Diagnosis: GERD (gastroesophageal reflux disease)  Assessment and Plan of Treatment: Continue pantoprazole 40mg daily as prescribed. You should follow up with your outpatient primary care doctor outside the hospital.     PRINCIPAL DISCHARGE DIAGNOSIS  Diagnosis: MDD (major depressive disorder), single episode, severe with psychotic features  Assessment and Plan of Treatment: Continue with Remeron and risperidone

## 2021-01-04 NOTE — BH INPATIENT PSYCHIATRY PROGRESS NOTE - NSBHCHARTREVIEWVS_PSY_A_CORE FT
Vital Signs Last 24 Hrs  T(C): 36.8 (04 Jan 2021 06:50), Max: 36.9 (03 Jan 2021 21:44)  T(F): 98.3 (04 Jan 2021 06:50), Max: 98.4 (03 Jan 2021 21:44)  HR: --  BP: --  BP(mean): --  RR: --  SpO2: --

## 2021-01-05 PROCEDURE — 99232 SBSQ HOSP IP/OBS MODERATE 35: CPT

## 2021-01-05 RX ORDER — RISPERIDONE 4 MG/1
3 TABLET ORAL
Refills: 0 | Status: DISCONTINUED | OUTPATIENT
Start: 2021-01-05 | End: 2021-01-07

## 2021-01-05 RX ADMIN — RISPERIDONE 3 MILLIGRAM(S): 4 TABLET ORAL at 17:24

## 2021-01-05 RX ADMIN — MIRTAZAPINE 45 MILLIGRAM(S): 45 TABLET, ORALLY DISINTEGRATING ORAL at 17:24

## 2021-01-05 RX ADMIN — Medication 5 MILLILITER(S): at 13:01

## 2021-01-05 RX ADMIN — PANTOPRAZOLE SODIUM 40 MILLIGRAM(S): 20 TABLET, DELAYED RELEASE ORAL at 06:35

## 2021-01-05 RX ADMIN — Medication 5 MILLILITER(S): at 20:04

## 2021-01-05 NOTE — BH INPATIENT PSYCHIATRY PROGRESS NOTE - NSBHFUPINTERVALHXFT_PSY_A_CORE
Pt seen for follow up depression with psychosis. Chart reviewed, pt adherent to meds except senna, no prns.    Pt seen in day room when approached for interview this AM. On interview, pt endorsed improved sleep, good appetite. Mood ok, still a little down. Endorsed continued anxious thoughts about MAURICIO and medical bills, and about her medical health in general. No SIIP/HIIP/AVH elicited. Denied side effects of medications. Discussed possibility for ECT, pt declined at this time, to reconsider at the end of the week.

## 2021-01-05 NOTE — BH INPATIENT PSYCHIATRY PROGRESS NOTE - NSBHCHARTREVIEWVS_PSY_A_CORE FT
Vital Signs Last 24 Hrs  T(C): 36.8 (05 Jan 2021 06:43), Max: 37.2 (04 Jan 2021 18:10)  T(F): 98.3 (05 Jan 2021 06:43), Max: 98.9 (04 Jan 2021 18:10)  HR: --  BP: --  BP(mean): --  RR: --  SpO2: --

## 2021-01-05 NOTE — BH INPATIENT PSYCHIATRY PROGRESS NOTE - CASE SUMMARY
Will give Risperdal in AM so if declined can try later, will begin process of discussing ECT with patient  family
PAtient appears better out of room more verbal. Cont Risperdal increase Remeron
Plan titrate risperdal and Remeron, discussing ECT with patient and family, patient currently quite psychotic but less fearful that emds and unit are fake
Patient showing modest gains cont to titrate  Remeron and risperidone
Pt remains depressed, perseverative in her thinking and paranoid, with poor insight and preoccupation with discharge. Continue to titrate medications. 
Pt remains preoccupied with finances, has PMR, is withdrawn. C/w medication titration and encourage pt to consider ECT.
Pt is less focused on delusional beliefs, will increase remeron over the weekend, c/w current dose if risperidone.
Pt with MDD with psychotic features, on remeron and risperidone, is able to reality test more than during last time writer saw her, but remains depressed. Consider ECT if no further improvement with medication management. No acute safety concerns at this time.
Pt endorsing improvement in mood this AM, slept better, less focused on delusional beliefs. C/w titration of remeron and risperidone.
Pt preoccupied with finances, does not fully believe her insurance is covering hospitalization. Will increase risperidone.

## 2021-01-05 NOTE — BH INPATIENT PSYCHIATRY PROGRESS NOTE - NSBHASSESSSUMMFT_PSY_ALL_CORE
Pt remains depressed, anxious, with decreased delusional thinking at this moment, hopeful that she will start feeling better soon.  Continue mirtazapine and risperidone titrations.    Pt continues to require inpatient hospitalization for further stabilization of depressive symptoms. Pt remains depressed, anxious, with decreased delusional thinking at this moment, hopeful that she will start feeling better soon.  Continue mirtazapine and risperidone titrations, increase risperidone today to 3mg.    Pt continues to require inpatient hospitalization for further stabilization of depressive symptoms.

## 2021-01-06 PROCEDURE — 99232 SBSQ HOSP IP/OBS MODERATE 35: CPT

## 2021-01-06 RX ADMIN — RISPERIDONE 3 MILLIGRAM(S): 4 TABLET ORAL at 16:34

## 2021-01-06 RX ADMIN — PANTOPRAZOLE SODIUM 40 MILLIGRAM(S): 20 TABLET, DELAYED RELEASE ORAL at 06:40

## 2021-01-06 RX ADMIN — SENNA PLUS 2 TABLET(S): 8.6 TABLET ORAL at 16:33

## 2021-01-06 RX ADMIN — MIRTAZAPINE 45 MILLIGRAM(S): 45 TABLET, ORALLY DISINTEGRATING ORAL at 16:33

## 2021-01-06 RX ADMIN — POLYETHYLENE GLYCOL 3350 17 GRAM(S): 17 POWDER, FOR SOLUTION ORAL at 08:47

## 2021-01-06 RX ADMIN — Medication 5 MILLILITER(S): at 08:47

## 2021-01-06 RX ADMIN — Medication 5 MILLILITER(S): at 20:05

## 2021-01-06 NOTE — BH INPATIENT PSYCHIATRY PROGRESS NOTE - NSBHFUPINTERVALHXFT_PSY_A_CORE
Pt reports she is having a good day today, in terms of mood. Pt was visible - out of her room the entire day, did not go back to bed after breakfast like previously, was interacting with a few peers. Appetite is fair and energy is improving. Continues to question if her children really are managing her finance and still worries about this, but is able to let it go more easily. No side effects from meds reported.

## 2021-01-06 NOTE — BH INPATIENT PSYCHIATRY PROGRESS NOTE - MSE UNSTRUCTURED FT
Pt is awake and alert oriented and in no acute physical distress. Fair grooming and hygiene. No agitation/EPS/retardation noted. Cooperative, makes fair eye contact. Mood is  "better today" affect is dysphoric, anxious, congruent, constricted. Thoughts are linear, denies any SI/HI, still questioning delusions regarding finances, bills, etc.  No hallucinations elicited. Insight and judgment are impaired yet improving. Fair impulse control

## 2021-01-06 NOTE — BH INPATIENT PSYCHIATRY PROGRESS NOTE - NSBHCHARTREVIEWVS_PSY_A_CORE FT
Vital Signs Last 24 Hrs  T(C): 36.9 (06 Jan 2021 14:45), Max: 36.9 (06 Jan 2021 14:45)  T(F): 98.4 (06 Jan 2021 14:45), Max: 98.4 (06 Jan 2021 14:45)  HR: --  BP: --  BP(mean): --  RR: --  SpO2: --

## 2021-01-07 PROCEDURE — 99232 SBSQ HOSP IP/OBS MODERATE 35: CPT

## 2021-01-07 RX ORDER — RISPERIDONE 4 MG/1
3.5 TABLET ORAL
Refills: 0 | Status: DISCONTINUED | OUTPATIENT
Start: 2021-01-07 | End: 2021-01-11

## 2021-01-07 RX ADMIN — Medication 5 MILLILITER(S): at 09:26

## 2021-01-07 RX ADMIN — PANTOPRAZOLE SODIUM 40 MILLIGRAM(S): 20 TABLET, DELAYED RELEASE ORAL at 06:17

## 2021-01-07 RX ADMIN — POLYETHYLENE GLYCOL 3350 17 GRAM(S): 17 POWDER, FOR SOLUTION ORAL at 09:26

## 2021-01-07 RX ADMIN — MIRTAZAPINE 45 MILLIGRAM(S): 45 TABLET, ORALLY DISINTEGRATING ORAL at 17:15

## 2021-01-07 RX ADMIN — SENNA PLUS 2 TABLET(S): 8.6 TABLET ORAL at 17:15

## 2021-01-07 RX ADMIN — RISPERIDONE 3.5 MILLIGRAM(S): 4 TABLET ORAL at 17:15

## 2021-01-07 RX ADMIN — Medication 5 MILLILITER(S): at 20:53

## 2021-01-07 NOTE — BH INPATIENT PSYCHIATRY PROGRESS NOTE - NSBHFUPINTERVALHXFT_PSY_A_CORE
Pt reports she feels 'good' today and slept well last night. She finds that is easier to interact with peers, and has more motivation to do this. She isn't returning to her room right away, as she was doing previously. However she continues to ruminate regarding her finances, and feels that if she were outside of the hospital, she'd be able to be in control of more things in her life.

## 2021-01-07 NOTE — BH INPATIENT PSYCHIATRY PROGRESS NOTE - NSBHASSESSSUMMFT_PSY_ALL_CORE
C/w remeron, increase risperidone to 3.5mg, will f/u with her son regarding recommendation to consider ECT

## 2021-01-07 NOTE — BH INPATIENT PSYCHIATRY PROGRESS NOTE - NSBHCHARTREVIEWVS_PSY_A_CORE FT
Vital Signs Last 24 Hrs  T(C): 36.9 (07 Jan 2021 10:59), Max: 37.1 (06 Jan 2021 18:32)  T(F): 98.4 (07 Jan 2021 10:59), Max: 98.7 (06 Jan 2021 18:32)  HR: --  BP: --  BP(mean): --  RR: --  SpO2: --

## 2021-01-07 NOTE — BH INPATIENT PSYCHIATRY PROGRESS NOTE - MSE UNSTRUCTURED FT
Pt is awake and alert oriented and in no acute physical distress. Fair grooming and hygiene. No agitation/EPS/retardation noted. Cooperative, makes fair eye contact. Mood is  "better " affect is dysphoric, anxious, congruent, constricted. Thought process linear, thought content - denies any SI/HI, still questioning delusions regarding finances, bills, etc.  No hallucinations elicited. Insight and judgment are improving. Fair impulse control

## 2021-01-08 PROCEDURE — 99232 SBSQ HOSP IP/OBS MODERATE 35: CPT

## 2021-01-08 RX ADMIN — RISPERIDONE 3.5 MILLIGRAM(S): 4 TABLET ORAL at 17:30

## 2021-01-08 RX ADMIN — Medication 5 MILLILITER(S): at 09:55

## 2021-01-08 RX ADMIN — SENNA PLUS 2 TABLET(S): 8.6 TABLET ORAL at 17:30

## 2021-01-08 RX ADMIN — Medication 5 MILLILITER(S): at 20:27

## 2021-01-08 RX ADMIN — MIRTAZAPINE 45 MILLIGRAM(S): 45 TABLET, ORALLY DISINTEGRATING ORAL at 17:30

## 2021-01-08 RX ADMIN — PANTOPRAZOLE SODIUM 40 MILLIGRAM(S): 20 TABLET, DELAYED RELEASE ORAL at 06:59

## 2021-01-08 NOTE — BH TREATMENT PLAN - NSTXSLFCREGOAL_PSY_ALL_CORE
Will adhere to prescribed diet 100% of the time
Will perform ADLs without assistance/prompts daily
Will perform ADLs without assistance/prompts daily
Be able to demonstrate the ability to care for self in 2 areas such as feeding oneself and hygiene

## 2021-01-08 NOTE — BH TREATMENT PLAN - NSTXDCOTHRGOAL_PSY_ALL_CORE
Complex treatment disposition. Pt will be able to participate in her care and follow up with resolution of her paranoia
Complex treatment disposition.  Pt will be able to initiate her self-care, and participate in DC planning without paranoia
Complex treatment disposition.  Pt will be able tolerate care, and interactions x 7 days with treatment of her paranoia

## 2021-01-08 NOTE — BH INPATIENT PSYCHIATRY PROGRESS NOTE - NSBHFUPINTERVALHXFT_PSY_A_CORE
Pt reports she continues to feel better - has more energy and has been socializing more. Still worrying to some degree about finances, feels that when she is not in the hospital she will feel better about being in control of the things she is usually in control of , including paying her bills. Eating at meals and sleeping well. Denying SI. Taking medications without reported side effects.

## 2021-01-08 NOTE — BH TREATMENT PLAN - NSTXPLANTHERAPYSESSIONSFT_PSY_ALL_CORE
01-06-21  Type of therapy: Coping skills,Creative arts therapy,Health and fitness,Music therapy  Type of session: Group  Level of patient participation: Participated with encouragement  Duration of participation: 30 minutes  Therapy conducted by: Psych rehab  Therapy Summary: In response to the Covid-19 outbreak there has been a shift in hospital wide policies and protocols.  As a result it should be noted the unit structure and activities have been temporarily modified to promote safety of patients and staff.  Patient over the past week made some gains towards her rehab goals. Patient is less isolative and is spending longer periods in common patient areas. At times patient can be observed watching television with her peers. Patient eats her meals with the community. Patient is complying with her medications and is attentive to her grooming. Patient with moderate encouragement participated in several of the morning and afternoon rehab activities.  During activities patient is cooperative, mildly verbal and is able to stay task focused with moderate redirection. However, patient continues to endorse symptoms of anxiety and depression. Patient remains moderately suspicious of others.  
  12-23-20  Type of therapy: Coping skills  Type of session: Individual  Level of patient participation: Resistance to participation  Duration of participation: 15 minutes  Therapy conducted by: Psych rehab  Therapy Summary: In response to the Covid-19 outbreak there has been a shift in hospital wide policies and protocols. As a result it should be noted the unit structure and activities have been temporarily modified to promote safety of patients and staff.  Patient over the past week has not made any major gains towards her rehab goals. Patient continues to endorse feeling very anxious and depressed. Patient remains isolative and does not initiate social interaction with her peers. Patient continues to report poo sleep, apetite and poor concentration. Despite daily efforts by writer and other members of the treatment team patient has not participated in any of the group activities.  Patient remains very focused on being discharged either today or early tomorrow. Patient stated she knows all the other patients will be discharged by today. Patient would not elaborate on her statement.  However, patient is complying with her medications and is attentive to her hygiene and grooming.  
  12-30-20  Type of therapy: Coping skills,Creative arts therapy,Health and fitness,Music therapy  Type of session: Individual  Level of patient participation: Participated with encouragement  Duration of participation: 15 minutes  Therapy conducted by: Psych rehab  Therapy Summary: In response to the Covid-19 outbreak there has been a shift in hospital wide policies and protocols. As a result it should be noted the unit structure and activities have been temporarily modified to promote safety of patients and staff. Patient over the past week made some gains towards her rehab goals. Patient describes her mood as less anxious and less depressed. Patient stated her appetite is improving and the quality of her sleep is improving. Patient with maximum encouragement attended several of the [facility] exercise activities. During activities patient is quiet but cooperative and is able to stay task focused with moderate redirection. Patient is complying with her medications and is attending to her ADL's.  However, patient is generally withdrawn and isolative.

## 2021-01-08 NOTE — BH TREATMENT PLAN - NSTXDEPRESINTERMD_PSY_ALL_CORE
Continue meds and therapeutic interventions.  
continue titrating risperdal and remeron, will consider ECT  
continue titrating risperdal and remeron
continue titrating risperdal and remeron, will consider ECT  -Biotene BID for dry mouth

## 2021-01-08 NOTE — BH TREATMENT PLAN - NSTXDCOPNOINTERSW_PSY_ALL_CORE
SW will offer continued support through meetings with pt, and encourage her acceptance of outpatient care
SW encourages pt to comply with medication and outpatient follow up for DC.
SW meets with pt routinely for ongoing assessment and supportive contacts to build trust

## 2021-01-08 NOTE — BH TREATMENT PLAN - NSBHPRIMARYDX_PSY_ALL_CORE
Psychotic disorder    
MDD (major depressive disorder), single episode, severe with psychotic features    

## 2021-01-08 NOTE — BH TREATMENT PLAN - NSTXDEPRESGOAL_PSY_ALL_CORE
Exhibit improvements in self-grooming, hygiene, sleep and appetite
Report using a coping skill to overcome sadness and worry in order to socialize with peers daily
Exhibit improvements in self-grooming, hygiene, sleep and appetite
Will identify 2 coping skills that assist in improving mood

## 2021-01-08 NOTE — BH TREATMENT PLAN - NSTXDCOPNOGOAL_PSY_ALL_CORE
Will agree to participate in appropriate outpatient care

## 2021-01-08 NOTE — BH TREATMENT PLAN - NSTXCOPEINTERPR_PSY_ALL_CORE
Patient will identify and utilize two coping skills daily to manage her depressive symptoms within seven days.

## 2021-01-08 NOTE — BH TREATMENT PLAN - NSDCCRITERIA_PSY_ALL_CORE
Pt warrants different acuity of care

## 2021-01-08 NOTE — BH TREATMENT PLAN - NSTXCOPEINTERMD_PSY_ALL_CORE
c/w risperidone and remeron
risperidone 2mg at 17:00, Remeron at 17:00 15mg, push fluids
risperidone 2.5mg at 17:00, Remeron increase to 37.5mg at 17:00 15mg, push fluids

## 2021-01-08 NOTE — BH TREATMENT PLAN - NSTXPROBDCOPNO_PSY_ALL_CORE
DISCHARGE ISSUE - NON-ADHERENT WITH OUTPATIENT SERVICES

## 2021-01-08 NOTE — BH INPATIENT PSYCHIATRY PROGRESS NOTE - NSBHCHARTREVIEWVS_PSY_A_CORE FT
Vital Signs Last 24 Hrs  T(C): 37.1 (08 Jan 2021 15:12), Max: 37.2 (07 Jan 2021 18:38)  T(F): 98.7 (08 Jan 2021 15:12), Max: 98.9 (07 Jan 2021 18:38)  HR: --  BP: --  BP(mean): --  RR: --  SpO2: --

## 2021-01-08 NOTE — BH TREATMENT PLAN - NSTXPSYCHOINTERMD_PSY_ALL_CORE
This is a is 80 year old  female, , domiciled alone, w/ PPH MDD/Anxiety which started after her ’s death 3 years ago, not in outpt ttx, no hx of inpt psych admissions or suicide attempts, no hx of aggression, violence, legal issues or substance abuse problems, w/ PMH ofHTN, HLD, OAB, Cholelithiasis with acute cholecystitis & Diverticulosis. Patient presented to [facility] on 12/11 with psychosis, admitted to medicine for cystitis and seen by  psychiatry and found to have paranoid delusions of being poisoned resulting in depression, passive SI, and functional impairment of ADLs. Patient now being admitted 2PC to [facility] 2S for management of psychosis. On admission, patient is psychotically paranoid, confused, and endorses depression and passive SI. Given her psychosis is impairing her ability to care for herself, she meets criteria for inpatient psychiatric admission for safety and stabilization.    PLAN:  - Admit to 2S. Patient requires inpatient hospitalization for stabilization and safety.  - Legal: 2PC  - Safety: Routine obs  - Psych, standing: Continue Risperdal 0.25 mg PO qam and 0.5 mg po QHS for ongoing psychosis, if qtc<500.  - Psych, PRN: Zyprexa 1.25 mg PO/IM PRN agitation  - Medical: Continued med rec, including: amlodipine 2.5 mg po, melatonin 6 mg QHS, pantoprazole 40 mg PO DR daily, PEG 17 gm daily  - Substance: NTD  - Diet: Dash/TLC  - Consult: Consider PT evaluation in am.   - Lab: ECG
Plan  - Safety: Routine obs  - Psych, standing: Risperidone 2mg at 17:00, Remeron 15mg at 17:00. To consider ECT.  - Psych, PRN: Zyprexa 1.25 mg PO/IM PRN agitation  - Medical: Continued med rec, including: amlodipine 2.5 mg po, melatonin 6 mg QHS, pantoprazole 40 mg PO DR daily, PEG 17 gm daily  - Diet: Dash/TLC
c/w risperidone and remeron
Plan  - Safety: Routine obs  - Psych, standing: Risperidone 2.5mg at 17:00, Remeron 37.5mg at 17:00. To consider ECT.  - Psych, PRN: Zyprexa 1.25 mg PO/IM PRN agitation  - Medical: Continued med rec, including: melatonin 6 mg QHS, pantoprazole 40 mg PO DR daily, PEG 17 gm daily  - Diet: Dash/TLC

## 2021-01-08 NOTE — BH INPATIENT PSYCHIATRY PROGRESS NOTE - MSE UNSTRUCTURED FT
Pt is awake and alert oriented and in no acute physical distress. Fair grooming and hygiene. No agitation/EPS/retardation noted. Cooperative, makes fair eye contact. Mood is  "better " affect mildly constricted to dysphoria with periods of reactivity; thought process linear, thought content - denies any SI/HI, some preoccupation with finances but no overt delusions present; No hallucinations elicited. Insight and judgment are improving. Fair impulse control

## 2021-01-08 NOTE — BH TREATMENT PLAN - NSTXPSYCHOPROGRES_PSY_ALL_CORE
Dr Rios  [phone]                Dr Mathieu Lord pain   [phone]
Improving
Improving
No Change

## 2021-01-08 NOTE — BH TREATMENT PLAN - NSTXPSYCHOGOAL_PSY_ALL_CORE
Other...
Will engage in a 15 minute conversation with no irrational content
Will verbalize 1 strategy to successfully cope with psychotic symptoms
Will report using relaxation skills 3 times a day to reduce anxiety about delusions/hallucinations

## 2021-01-08 NOTE — BH TREATMENT PLAN - NSTXFALLGOAL_PSY_ALL_CORE
Ask for assistance when getting out of bed/chair and upon ambulation
Verbalize understanding of fall prevention interventions
Use non-slip footwear when out of bed

## 2021-01-08 NOTE — BH TREATMENT PLAN - NSTXPATIENTPARTICIPATE_PSY_ALL_CORE
Patient participated in identification of needs/problems/goals for treatment
Patient participated in identification of needs/problems/goals for treatment/Patient participated in defining interventions/Patient participated in development of after care plan
Patient participated in identification of needs/problems/goals for treatment/Patient participated in defining interventions
Patient participated in identification of needs/problems/goals for treatment/Patient participated in defining interventions

## 2021-01-09 RX ADMIN — MIRTAZAPINE 45 MILLIGRAM(S): 45 TABLET, ORALLY DISINTEGRATING ORAL at 16:53

## 2021-01-09 RX ADMIN — RISPERIDONE 3.5 MILLIGRAM(S): 4 TABLET ORAL at 16:53

## 2021-01-09 RX ADMIN — PANTOPRAZOLE SODIUM 40 MILLIGRAM(S): 20 TABLET, DELAYED RELEASE ORAL at 06:15

## 2021-01-10 RX ADMIN — RISPERIDONE 3.5 MILLIGRAM(S): 4 TABLET ORAL at 16:43

## 2021-01-10 RX ADMIN — PANTOPRAZOLE SODIUM 40 MILLIGRAM(S): 20 TABLET, DELAYED RELEASE ORAL at 09:20

## 2021-01-10 RX ADMIN — MIRTAZAPINE 45 MILLIGRAM(S): 45 TABLET, ORALLY DISINTEGRATING ORAL at 16:42

## 2021-01-10 RX ADMIN — Medication 5 MILLILITER(S): at 20:50

## 2021-01-11 PROCEDURE — 99232 SBSQ HOSP IP/OBS MODERATE 35: CPT

## 2021-01-11 RX ORDER — RISPERIDONE 4 MG/1
4 TABLET ORAL
Refills: 0 | Status: DISCONTINUED | OUTPATIENT
Start: 2021-01-11 | End: 2021-01-15

## 2021-01-11 RX ADMIN — MIRTAZAPINE 45 MILLIGRAM(S): 45 TABLET, ORALLY DISINTEGRATING ORAL at 17:15

## 2021-01-11 RX ADMIN — Medication 5 MILLILITER(S): at 08:42

## 2021-01-11 RX ADMIN — RISPERIDONE 4 MILLIGRAM(S): 4 TABLET ORAL at 17:29

## 2021-01-11 RX ADMIN — PANTOPRAZOLE SODIUM 40 MILLIGRAM(S): 20 TABLET, DELAYED RELEASE ORAL at 06:45

## 2021-01-11 RX ADMIN — Medication 5 MILLILITER(S): at 22:07

## 2021-01-11 NOTE — BH INPATIENT PSYCHIATRY PROGRESS NOTE - NSBHCHARTREVIEWVS_PSY_A_CORE FT
Vital Signs Last 24 Hrs  T(C): 36.9 (11 Jan 2021 14:58), Max: 37.2 (10 Connor 2021 22:42)  T(F): 98.4 (11 Jan 2021 14:58), Max: 98.9 (10 Connor 2021 22:42)  HR: --  BP: --  BP(mean): --  RR: --  SpO2: --

## 2021-01-11 NOTE — BH INPATIENT PSYCHIATRY PROGRESS NOTE - MSE UNSTRUCTURED FT
Older woman casually dressed, fair grooming and hygiene. Calm and cooperative, smiling appropriately; no psychomotor disturbance; speech - regular rate, volume, tone, much more spontaneous; Mood is  "good" affect mildly constricted to dysphoria with periods of reactivity; thought process linear, thought content -no overt delusions elicited, denying SI/HI,  No hallucinations elicited. Insight and judgment are improving. Fair impulse control

## 2021-01-11 NOTE — BH INPATIENT PSYCHIATRY PROGRESS NOTE - NSBHFUPINTERVALHXFT_PSY_A_CORE
Pt reports ongoing improvement in her overall mood. Feels much less anxious and reports feeling as if a 'weight' has been lifted, in terms of overall level of worry. Mood is 'pretty good' and denies feeling depressed. She is sleeping and eating better. Talks about having adjusted to being in the hospital and feeling comfortable here now, but is also very much looking toward discharge in the near future. Spoke with her son Khoi to provide update, answer questions, discuss d/c planning for later this week.

## 2021-01-12 PROCEDURE — 99232 SBSQ HOSP IP/OBS MODERATE 35: CPT

## 2021-01-12 RX ADMIN — SENNA PLUS 2 TABLET(S): 8.6 TABLET ORAL at 17:13

## 2021-01-12 RX ADMIN — MIRTAZAPINE 45 MILLIGRAM(S): 45 TABLET, ORALLY DISINTEGRATING ORAL at 17:12

## 2021-01-12 RX ADMIN — Medication 5 MILLILITER(S): at 20:05

## 2021-01-12 RX ADMIN — RISPERIDONE 4 MILLIGRAM(S): 4 TABLET ORAL at 17:13

## 2021-01-12 RX ADMIN — Medication 5 MILLILITER(S): at 09:55

## 2021-01-12 RX ADMIN — PANTOPRAZOLE SODIUM 40 MILLIGRAM(S): 20 TABLET, DELAYED RELEASE ORAL at 06:34

## 2021-01-12 NOTE — BH INPATIENT PSYCHIATRY PROGRESS NOTE - NSBHFUPINTERVALHXFT_PSY_A_CORE
PT continues to report feeling well , no longer feeling depressed or sad, no longer worrying as much about finances and other things. Looking forward to going home later this week.

## 2021-01-12 NOTE — BH INPATIENT PSYCHIATRY PROGRESS NOTE - MSE UNSTRUCTURED FT
Older woman casually dressed, fair grooming and hygiene. Calm and cooperative, smiling appropriately; no psychomotor disturbance; speech - regular rate, volume, tone, much more spontaneous; Mood is  "good" affect bright, reactive; thought process linear, thought content -no overt delusions elicited, denying SI/HI,  No hallucinations elicited. Insight and judgment are improving. Fair impulse control

## 2021-01-12 NOTE — BH INPATIENT PSYCHIATRY PROGRESS NOTE - NSBHCHARTREVIEWVS_PSY_A_CORE FT
Vital Signs Last 24 Hrs  T(C): 36.9 (12 Jan 2021 14:42), Max: 36.9 (11 Jan 2021 19:11)  T(F): 98.4 (12 Jan 2021 14:42), Max: 98.4 (11 Jan 2021 19:11)  HR: --  BP: --  BP(mean): --  RR: --  SpO2: --

## 2021-01-13 PROCEDURE — 99232 SBSQ HOSP IP/OBS MODERATE 35: CPT

## 2021-01-13 RX ADMIN — PANTOPRAZOLE SODIUM 40 MILLIGRAM(S): 20 TABLET, DELAYED RELEASE ORAL at 06:39

## 2021-01-13 RX ADMIN — Medication 5 MILLILITER(S): at 08:46

## 2021-01-13 RX ADMIN — MIRTAZAPINE 45 MILLIGRAM(S): 45 TABLET, ORALLY DISINTEGRATING ORAL at 17:11

## 2021-01-13 RX ADMIN — RISPERIDONE 4 MILLIGRAM(S): 4 TABLET ORAL at 17:11

## 2021-01-13 RX ADMIN — Medication 5 MILLILITER(S): at 20:30

## 2021-01-13 RX ADMIN — SENNA PLUS 2 TABLET(S): 8.6 TABLET ORAL at 17:11

## 2021-01-13 NOTE — BH INPATIENT PSYCHIATRY PROGRESS NOTE - NSBHCHARTREVIEWVS_PSY_A_CORE FT
Vital Signs Last 24 Hrs  T(C): 36.9 (13 Jan 2021 14:44), Max: 36.9 (12 Jan 2021 18:46)  T(F): 98.4 (13 Jan 2021 14:44), Max: 98.5 (13 Jan 2021 10:53)  HR: --  BP: --  BP(mean): --  RR: --  SpO2: --

## 2021-01-13 NOTE — BH INPATIENT PSYCHIATRY PROGRESS NOTE - NSBHFUPINTERVALHXFT_PSY_A_CORE
Pt slept better last night and wasn't disrupted by her roommate. Continues to look forward to going home. Feels good, denies sad mood, is not as anxious. Energy is improving and she thinks she will have motivation to take care of certain things, once she is discharged. No side effects reported from medication.

## 2021-01-14 PROCEDURE — 99232 SBSQ HOSP IP/OBS MODERATE 35: CPT

## 2021-01-14 RX ORDER — RISPERIDONE 4 MG/1
1 TABLET ORAL
Qty: 30 | Refills: 0
Start: 2021-01-14 | End: 2021-02-12

## 2021-01-14 RX ORDER — SALIVA SUBSTITUTE COMB NO.11 351 MG
15 POWDER IN PACKET (EA) MUCOUS MEMBRANE
Qty: 0 | Refills: 0 | DISCHARGE

## 2021-01-14 RX ORDER — MIRTAZAPINE 45 MG/1
1 TABLET, ORALLY DISINTEGRATING ORAL
Qty: 30 | Refills: 0
Start: 2021-01-14 | End: 2021-02-12

## 2021-01-14 RX ORDER — AMLODIPINE BESYLATE 2.5 MG/1
1 TABLET ORAL
Qty: 0 | Refills: 0 | DISCHARGE

## 2021-01-14 RX ADMIN — Medication 5 MILLILITER(S): at 20:44

## 2021-01-14 RX ADMIN — RISPERIDONE 4 MILLIGRAM(S): 4 TABLET ORAL at 17:22

## 2021-01-14 RX ADMIN — PANTOPRAZOLE SODIUM 40 MILLIGRAM(S): 20 TABLET, DELAYED RELEASE ORAL at 06:30

## 2021-01-14 RX ADMIN — MIRTAZAPINE 45 MILLIGRAM(S): 45 TABLET, ORALLY DISINTEGRATING ORAL at 17:21

## 2021-01-14 RX ADMIN — Medication 5 MILLILITER(S): at 09:32

## 2021-01-14 NOTE — BH INPATIENT PSYCHIATRY PROGRESS NOTE - NSICDXBHPRIMARYDX_PSY_ALL_CORE
MDD (major depressive disorder), single episode, severe with psychotic features   F32.3  
Psychotic disorder   F29  
MDD (major depressive disorder), single episode, severe with psychotic features   F32.3  
Psychotic disorder   F29  
MDD (major depressive disorder), single episode, severe with psychotic features   F32.3  
Psychotic disorder   F29  
Psychotic disorder   F29  
MDD (major depressive disorder), single episode, severe with psychotic features   F32.3  

## 2021-01-14 NOTE — BH DISCHARGE NOTE NURSING/SOCIAL WORK/PSYCH REHAB - NSCDUDCCRISIS_PSY_A_CORE
[facility]  1 [phone] [facility]LL ([phone])  Text "WELL" to [phone]  Website: www.[employer]/.[employer] 1 [phone] 841-ONTI (1110) Website: www.safehorizon.org/.National Suicide Prevention Lifeline 9 (643) 452-4600/.  [employer]  1 (491) LIF[employer] (112-8256)/.  [facility]  [phone] [place] [phone]   Hours:  Monday through Friday from 9 AM to 3 PM/.  [employer] [facility] Crisis Line  [phone], Option 1

## 2021-01-14 NOTE — BH INPATIENT PSYCHIATRY PROGRESS NOTE - CURRENT MEDICATION
MEDICATIONS  (STANDING):  Biotene Dry Mouth Oral Rinse 5 milliLiter(s) Swish and Spit two times a day  influenza  Vaccine (HIGH DOSE) 0.7 milliLiter(s) IntraMuscular once  mirtazapine 22.5 milliGRAM(s) Oral <User Schedule>  pantoprazole    Tablet 40 milliGRAM(s) Oral before breakfast  polyethylene glycol 3350 17 Gram(s) Oral daily  risperiDONE   Tablet 2.5 milliGRAM(s) Oral <User Schedule>  senna 2 Tablet(s) Oral <User Schedule>    MEDICATIONS  (PRN):  acetaminophen   Tablet .. 650 milliGRAM(s) Oral every 6 hours PRN Mild Pain (1 - 3), Moderate Pain (4 - 6)  OLANZapine Injectable 1.25 milliGRAM(s) IntraMuscular once PRN severe agitation  risperiDONE   Tablet 0.25 milliGRAM(s) Oral every 6 hours PRN agitation  
MEDICATIONS  (STANDING):  Biotene Dry Mouth Oral Rinse 5 milliLiter(s) Swish and Spit two times a day  influenza  Vaccine (HIGH DOSE) 0.7 milliLiter(s) IntraMuscular once  mirtazapine 30 milliGRAM(s) Oral <User Schedule>  pantoprazole    Tablet 40 milliGRAM(s) Oral before breakfast  polyethylene glycol 3350 17 Gram(s) Oral daily  risperiDONE   Tablet 2.5 milliGRAM(s) Oral <User Schedule>  senna 2 Tablet(s) Oral <User Schedule>    MEDICATIONS  (PRN):  acetaminophen   Tablet .. 650 milliGRAM(s) Oral every 6 hours PRN Mild Pain (1 - 3), Moderate Pain (4 - 6)  OLANZapine Injectable 1.25 milliGRAM(s) IntraMuscular once PRN severe agitation  risperiDONE   Tablet 0.25 milliGRAM(s) Oral every 6 hours PRN agitation  
MEDICATIONS  (STANDING):  Biotene Dry Mouth Oral Rinse 5 milliLiter(s) Swish and Spit two times a day  influenza  Vaccine (HIGH DOSE) 0.7 milliLiter(s) IntraMuscular once  mirtazapine 45 milliGRAM(s) Oral <User Schedule>  pantoprazole    Tablet 40 milliGRAM(s) Oral before breakfast  polyethylene glycol 3350 17 Gram(s) Oral daily  risperiDONE   Tablet 2.5 milliGRAM(s) Oral <User Schedule>  senna 2 Tablet(s) Oral <User Schedule>    MEDICATIONS  (PRN):  acetaminophen   Tablet .. 650 milliGRAM(s) Oral every 6 hours PRN Mild Pain (1 - 3), Moderate Pain (4 - 6)  OLANZapine Injectable 1.25 milliGRAM(s) IntraMuscular once PRN severe agitation  risperiDONE   Tablet 0.25 milliGRAM(s) Oral every 6 hours PRN agitation  
MEDICATIONS  (STANDING):  Biotene Dry Mouth Oral Rinse 5 milliLiter(s) Swish and Spit two times a day  influenza  Vaccine (HIGH DOSE) 0.7 milliLiter(s) IntraMuscular once  mirtazapine 45 milliGRAM(s) Oral <User Schedule>  pantoprazole    Tablet 40 milliGRAM(s) Oral before breakfast  polyethylene glycol 3350 17 Gram(s) Oral daily  risperiDONE   Tablet 2.5 milliGRAM(s) Oral <User Schedule>  senna 2 Tablet(s) Oral <User Schedule>    MEDICATIONS  (PRN):  acetaminophen   Tablet .. 650 milliGRAM(s) Oral every 6 hours PRN Mild Pain (1 - 3), Moderate Pain (4 - 6)  OLANZapine Injectable 1.25 milliGRAM(s) IntraMuscular once PRN severe agitation  risperiDONE   Tablet 0.25 milliGRAM(s) Oral every 6 hours PRN agitation  
MEDICATIONS  (STANDING):  Biotene Dry Mouth Oral Rinse 5 milliLiter(s) Swish and Spit two times a day  influenza  Vaccine (HIGH DOSE) 0.7 milliLiter(s) IntraMuscular once  mirtazapine 45 milliGRAM(s) Oral <User Schedule>  pantoprazole    Tablet 40 milliGRAM(s) Oral before breakfast  polyethylene glycol 3350 17 Gram(s) Oral daily  risperiDONE   Tablet 3 milliGRAM(s) Oral <User Schedule>  senna 2 Tablet(s) Oral <User Schedule>    MEDICATIONS  (PRN):  acetaminophen   Tablet .. 650 milliGRAM(s) Oral every 6 hours PRN Mild Pain (1 - 3), Moderate Pain (4 - 6)  OLANZapine Injectable 1.25 milliGRAM(s) IntraMuscular once PRN severe agitation  risperiDONE   Tablet 0.25 milliGRAM(s) Oral every 6 hours PRN agitation  
MEDICATIONS  (STANDING):  Biotene Dry Mouth Oral Rinse 5 milliLiter(s) Swish and Spit two times a day  mirtazapine 45 milliGRAM(s) Oral <User Schedule>  pantoprazole    Tablet 40 milliGRAM(s) Oral before breakfast  polyethylene glycol 3350 17 Gram(s) Oral daily  risperiDONE   Tablet 4 milliGRAM(s) Oral <User Schedule>  senna 2 Tablet(s) Oral <User Schedule>    MEDICATIONS  (PRN):  acetaminophen   Tablet .. 650 milliGRAM(s) Oral every 6 hours PRN Mild Pain (1 - 3), Moderate Pain (4 - 6)  OLANZapine Injectable 1.25 milliGRAM(s) IntraMuscular once PRN severe agitation  risperiDONE   Tablet 0.25 milliGRAM(s) Oral every 6 hours PRN agitation  
MEDICATIONS  (STANDING):  influenza  Vaccine (HIGH DOSE) 0.7 milliLiter(s) IntraMuscular once  mirtazapine 15 milliGRAM(s) Oral <User Schedule>  pantoprazole    Tablet 40 milliGRAM(s) Oral before breakfast  polyethylene glycol 3350 17 Gram(s) Oral daily  risperiDONE   Tablet 1 milliGRAM(s) Oral daily  senna 2 Tablet(s) Oral <User Schedule>    MEDICATIONS  (PRN):  acetaminophen   Tablet .. 650 milliGRAM(s) Oral every 6 hours PRN Mild Pain (1 - 3), Moderate Pain (4 - 6)  OLANZapine Injectable 1.25 milliGRAM(s) IntraMuscular once PRN severe agitation  risperiDONE   Tablet 0.25 milliGRAM(s) Oral every 6 hours PRN agitation  
MEDICATIONS  (STANDING):  influenza  Vaccine (HIGH DOSE) 0.7 milliLiter(s) IntraMuscular once  mirtazapine 15 milliGRAM(s) Oral <User Schedule>  pantoprazole    Tablet 40 milliGRAM(s) Oral before breakfast  polyethylene glycol 3350 17 Gram(s) Oral daily  risperiDONE   Tablet 1 milliGRAM(s) Oral daily  senna 2 Tablet(s) Oral <User Schedule>    MEDICATIONS  (PRN):  acetaminophen   Tablet .. 650 milliGRAM(s) Oral every 6 hours PRN Mild Pain (1 - 3), Moderate Pain (4 - 6)  OLANZapine Injectable 1.25 milliGRAM(s) IntraMuscular once PRN severe agitation  risperiDONE   Tablet 0.25 milliGRAM(s) Oral every 6 hours PRN agitation  
MEDICATIONS  (STANDING):  influenza  Vaccine (HIGH DOSE) 0.7 milliLiter(s) IntraMuscular once  mirtazapine 15 milliGRAM(s) Oral <User Schedule>  pantoprazole    Tablet 40 milliGRAM(s) Oral before breakfast  polyethylene glycol 3350 17 Gram(s) Oral daily  risperiDONE   Tablet 2 milliGRAM(s) Oral <User Schedule>  senna 2 Tablet(s) Oral <User Schedule>    MEDICATIONS  (PRN):  acetaminophen   Tablet .. 650 milliGRAM(s) Oral every 6 hours PRN Mild Pain (1 - 3), Moderate Pain (4 - 6)  OLANZapine Injectable 1.25 milliGRAM(s) IntraMuscular once PRN severe agitation  risperiDONE   Tablet 0.25 milliGRAM(s) Oral every 6 hours PRN agitation  
MEDICATIONS  (STANDING):  influenza  Vaccine (HIGH DOSE) 0.7 milliLiter(s) IntraMuscular once  mirtazapine 7.5 milliGRAM(s) Oral <User Schedule>  pantoprazole    Tablet 40 milliGRAM(s) Oral before breakfast  polyethylene glycol 3350 17 Gram(s) Oral daily  senna 2 Tablet(s) Oral <User Schedule>    MEDICATIONS  (PRN):  acetaminophen   Tablet .. 650 milliGRAM(s) Oral every 6 hours PRN Mild Pain (1 - 3), Moderate Pain (4 - 6)  OLANZapine Injectable 1.25 milliGRAM(s) IntraMuscular once PRN severe agitation  risperiDONE   Tablet 0.25 milliGRAM(s) Oral every 6 hours PRN agitation  
MEDICATIONS  (STANDING):  influenza  Vaccine (HIGH DOSE) 0.7 milliLiter(s) IntraMuscular once  mirtazapine 7.5 milliGRAM(s) Oral at bedtime  pantoprazole    Tablet 40 milliGRAM(s) Oral before breakfast  polyethylene glycol 3350 17 Gram(s) Oral daily  risperiDONE   Tablet 0.5 milliGRAM(s) Oral at bedtime  senna 2 Tablet(s) Oral at bedtime    MEDICATIONS  (PRN):  acetaminophen   Tablet .. 650 milliGRAM(s) Oral every 6 hours PRN Mild Pain (1 - 3), Moderate Pain (4 - 6)  OLANZapine Injectable 1.25 milliGRAM(s) IntraMuscular once PRN severe agitation  risperiDONE   Tablet 0.25 milliGRAM(s) Oral every 6 hours PRN agitation  
MEDICATIONS  (STANDING):  Biotene Dry Mouth Oral Rinse 5 milliLiter(s) Swish and Spit two times a day  mirtazapine 45 milliGRAM(s) Oral <User Schedule>  pantoprazole    Tablet 40 milliGRAM(s) Oral before breakfast  polyethylene glycol 3350 17 Gram(s) Oral daily  risperiDONE   Tablet 4 milliGRAM(s) Oral <User Schedule>  senna 2 Tablet(s) Oral <User Schedule>    MEDICATIONS  (PRN):  acetaminophen   Tablet .. 650 milliGRAM(s) Oral every 6 hours PRN Mild Pain (1 - 3), Moderate Pain (4 - 6)  OLANZapine Injectable 1.25 milliGRAM(s) IntraMuscular once PRN severe agitation  risperiDONE   Tablet 0.25 milliGRAM(s) Oral every 6 hours PRN agitation  
MEDICATIONS  (STANDING):  Biotene Dry Mouth Oral Rinse 5 milliLiter(s) Swish and Spit two times a day  mirtazapine 45 milliGRAM(s) Oral <User Schedule>  risperiDONE   Tablet 4 milliGRAM(s) Oral <User Schedule>    MEDICATIONS  (PRN):  acetaminophen   Tablet .. 650 milliGRAM(s) Oral every 6 hours PRN Mild Pain (1 - 3), Moderate Pain (4 - 6)  OLANZapine Injectable 1.25 milliGRAM(s) IntraMuscular once PRN severe agitation  risperiDONE   Tablet 0.25 milliGRAM(s) Oral every 6 hours PRN agitation  
MEDICATIONS  (STANDING):  Biotene Dry Mouth Oral Rinse 5 milliLiter(s) Swish and Spit two times a day  influenza  Vaccine (HIGH DOSE) 0.7 milliLiter(s) IntraMuscular once  mirtazapine 45 milliGRAM(s) Oral <User Schedule>  pantoprazole    Tablet 40 milliGRAM(s) Oral before breakfast  polyethylene glycol 3350 17 Gram(s) Oral daily  risperiDONE   Tablet 3.5 milliGRAM(s) Oral <User Schedule>  senna 2 Tablet(s) Oral <User Schedule>    MEDICATIONS  (PRN):  acetaminophen   Tablet .. 650 milliGRAM(s) Oral every 6 hours PRN Mild Pain (1 - 3), Moderate Pain (4 - 6)  OLANZapine Injectable 1.25 milliGRAM(s) IntraMuscular once PRN severe agitation  risperiDONE   Tablet 0.25 milliGRAM(s) Oral every 6 hours PRN agitation  
MEDICATIONS  (STANDING):  influenza  Vaccine (HIGH DOSE) 0.7 milliLiter(s) IntraMuscular once  mirtazapine 22.5 milliGRAM(s) Oral at bedtime  pantoprazole    Tablet 40 milliGRAM(s) Oral before breakfast  polyethylene glycol 3350 17 Gram(s) Oral daily  risperiDONE   Tablet 2 milliGRAM(s) Oral <User Schedule>  senna 2 Tablet(s) Oral <User Schedule>    MEDICATIONS  (PRN):  acetaminophen   Tablet .. 650 milliGRAM(s) Oral every 6 hours PRN Mild Pain (1 - 3), Moderate Pain (4 - 6)  OLANZapine Injectable 1.25 milliGRAM(s) IntraMuscular once PRN severe agitation  risperiDONE   Tablet 0.25 milliGRAM(s) Oral every 6 hours PRN agitation  
MEDICATIONS  (STANDING):  influenza  Vaccine (HIGH DOSE) 0.7 milliLiter(s) IntraMuscular once  mirtazapine 15 milliGRAM(s) Oral <User Schedule>  pantoprazole    Tablet 40 milliGRAM(s) Oral before breakfast  polyethylene glycol 3350 17 Gram(s) Oral daily  risperiDONE   Tablet 2 milliGRAM(s) Oral <User Schedule>  senna 2 Tablet(s) Oral <User Schedule>    MEDICATIONS  (PRN):  acetaminophen   Tablet .. 650 milliGRAM(s) Oral every 6 hours PRN Mild Pain (1 - 3), Moderate Pain (4 - 6)  OLANZapine Injectable 1.25 milliGRAM(s) IntraMuscular once PRN severe agitation  risperiDONE   Tablet 0.25 milliGRAM(s) Oral every 6 hours PRN agitation  
MEDICATIONS  (STANDING):  influenza  Vaccine (HIGH DOSE) 0.7 milliLiter(s) IntraMuscular once  mirtazapine 7.5 milliGRAM(s) Oral at bedtime  pantoprazole    Tablet 40 milliGRAM(s) Oral before breakfast  polyethylene glycol 3350 17 Gram(s) Oral daily  risperiDONE   Tablet 1 milliGRAM(s) Oral at bedtime  senna 2 Tablet(s) Oral at bedtime    MEDICATIONS  (PRN):  acetaminophen   Tablet .. 650 milliGRAM(s) Oral every 6 hours PRN Mild Pain (1 - 3), Moderate Pain (4 - 6)  OLANZapine Injectable 1.25 milliGRAM(s) IntraMuscular once PRN severe agitation  risperiDONE   Tablet 0.25 milliGRAM(s) Oral every 6 hours PRN agitation  
MEDICATIONS  (STANDING):  Biotene Dry Mouth Oral Rinse 5 milliLiter(s) Swish and Spit two times a day  influenza  Vaccine (HIGH DOSE) 0.7 milliLiter(s) IntraMuscular once  mirtazapine 22.5 milliGRAM(s) Oral <User Schedule>  pantoprazole    Tablet 40 milliGRAM(s) Oral before breakfast  polyethylene glycol 3350 17 Gram(s) Oral daily  risperiDONE   Tablet 2.5 milliGRAM(s) Oral <User Schedule>  senna 2 Tablet(s) Oral <User Schedule>    MEDICATIONS  (PRN):  acetaminophen   Tablet .. 650 milliGRAM(s) Oral every 6 hours PRN Mild Pain (1 - 3), Moderate Pain (4 - 6)  OLANZapine Injectable 1.25 milliGRAM(s) IntraMuscular once PRN severe agitation  risperiDONE   Tablet 0.25 milliGRAM(s) Oral every 6 hours PRN agitation  
MEDICATIONS  (STANDING):  influenza  Vaccine (HIGH DOSE) 0.7 milliLiter(s) IntraMuscular once  mirtazapine 15 milliGRAM(s) Oral <User Schedule>  pantoprazole    Tablet 40 milliGRAM(s) Oral before breakfast  polyethylene glycol 3350 17 Gram(s) Oral daily  risperiDONE   Tablet 2 milliGRAM(s) Oral <User Schedule>  senna 2 Tablet(s) Oral <User Schedule>    MEDICATIONS  (PRN):  acetaminophen   Tablet .. 650 milliGRAM(s) Oral every 6 hours PRN Mild Pain (1 - 3), Moderate Pain (4 - 6)  OLANZapine Injectable 1.25 milliGRAM(s) IntraMuscular once PRN severe agitation  risperiDONE   Tablet 0.25 milliGRAM(s) Oral every 6 hours PRN agitation  
MEDICATIONS  (STANDING):  Biotene Dry Mouth Oral Rinse 5 milliLiter(s) Swish and Spit two times a day  mirtazapine 45 milliGRAM(s) Oral <User Schedule>  pantoprazole    Tablet 40 milliGRAM(s) Oral before breakfast  polyethylene glycol 3350 17 Gram(s) Oral daily  risperiDONE   Tablet 3.5 milliGRAM(s) Oral <User Schedule>  senna 2 Tablet(s) Oral <User Schedule>    MEDICATIONS  (PRN):  acetaminophen   Tablet .. 650 milliGRAM(s) Oral every 6 hours PRN Mild Pain (1 - 3), Moderate Pain (4 - 6)  OLANZapine Injectable 1.25 milliGRAM(s) IntraMuscular once PRN severe agitation  risperiDONE   Tablet 0.25 milliGRAM(s) Oral every 6 hours PRN agitation  
MEDICATIONS  (STANDING):  influenza  Vaccine (HIGH DOSE) 0.7 milliLiter(s) IntraMuscular once  melatonin. 6 milliGRAM(s) Oral at bedtime  OLANZapine 2.5 milliGRAM(s) Oral at bedtime  pantoprazole    Tablet 40 milliGRAM(s) Oral before breakfast  polyethylene glycol 3350 17 Gram(s) Oral daily  senna 2 Tablet(s) Oral at bedtime    MEDICATIONS  (PRN):  acetaminophen   Tablet .. 650 milliGRAM(s) Oral every 6 hours PRN Mild Pain (1 - 3), Moderate Pain (4 - 6)  OLANZapine 1.25 milliGRAM(s) Oral every 24 hours PRN agitation  OLANZapine Injectable 1.25 milliGRAM(s) IntraMuscular once PRN severe agitation  
MEDICATIONS  (STANDING):  influenza  Vaccine (HIGH DOSE) 0.7 milliLiter(s) IntraMuscular once  melatonin. 6 milliGRAM(s) Oral at bedtime  OLANZapine 2.5 milliGRAM(s) Oral at bedtime  pantoprazole    Tablet 40 milliGRAM(s) Oral before breakfast  polyethylene glycol 3350 17 Gram(s) Oral daily  senna 2 Tablet(s) Oral at bedtime    MEDICATIONS  (PRN):  acetaminophen   Tablet .. 650 milliGRAM(s) Oral every 6 hours PRN Mild Pain (1 - 3), Moderate Pain (4 - 6)  OLANZapine 1.25 milliGRAM(s) Oral every 24 hours PRN agitation  OLANZapine Injectable 1.25 milliGRAM(s) IntraMuscular once PRN severe agitation  
MEDICATIONS  (STANDING):  Biotene Dry Mouth Oral Rinse 5 milliLiter(s) Swish and Spit two times a day  mirtazapine 45 milliGRAM(s) Oral <User Schedule>  pantoprazole    Tablet 40 milliGRAM(s) Oral before breakfast  polyethylene glycol 3350 17 Gram(s) Oral daily  risperiDONE   Tablet 3.5 milliGRAM(s) Oral <User Schedule>  senna 2 Tablet(s) Oral <User Schedule>    MEDICATIONS  (PRN):  acetaminophen   Tablet .. 650 milliGRAM(s) Oral every 6 hours PRN Mild Pain (1 - 3), Moderate Pain (4 - 6)  OLANZapine Injectable 1.25 milliGRAM(s) IntraMuscular once PRN severe agitation  risperiDONE   Tablet 0.25 milliGRAM(s) Oral every 6 hours PRN agitation  
MEDICATIONS  (STANDING):  influenza  Vaccine (HIGH DOSE) 0.7 milliLiter(s) IntraMuscular once  mirtazapine 15 milliGRAM(s) Oral <User Schedule>  pantoprazole    Tablet 40 milliGRAM(s) Oral before breakfast  polyethylene glycol 3350 17 Gram(s) Oral daily  senna 2 Tablet(s) Oral <User Schedule>    MEDICATIONS  (PRN):  acetaminophen   Tablet .. 650 milliGRAM(s) Oral every 6 hours PRN Mild Pain (1 - 3), Moderate Pain (4 - 6)  OLANZapine Injectable 1.25 milliGRAM(s) IntraMuscular once PRN severe agitation  risperiDONE   Tablet 0.25 milliGRAM(s) Oral every 6 hours PRN agitation

## 2021-01-14 NOTE — BH INPATIENT PSYCHIATRY PROGRESS NOTE - PRN MEDS
MEDICATIONS  (PRN):  acetaminophen   Tablet .. 650 milliGRAM(s) Oral every 6 hours PRN Mild Pain (1 - 3), Moderate Pain (4 - 6)  OLANZapine Injectable 1.25 milliGRAM(s) IntraMuscular once PRN severe agitation  risperiDONE   Tablet 0.25 milliGRAM(s) Oral every 6 hours PRN agitation  
MEDICATIONS  (PRN):  acetaminophen   Tablet .. 650 milliGRAM(s) Oral every 6 hours PRN Mild Pain (1 - 3), Moderate Pain (4 - 6)  OLANZapine 1.25 milliGRAM(s) Oral every 24 hours PRN agitation  OLANZapine Injectable 1.25 milliGRAM(s) IntraMuscular once PRN severe agitation  
MEDICATIONS  (PRN):  acetaminophen   Tablet .. 650 milliGRAM(s) Oral every 6 hours PRN Mild Pain (1 - 3), Moderate Pain (4 - 6)  OLANZapine Injectable 1.25 milliGRAM(s) IntraMuscular once PRN severe agitation  risperiDONE   Tablet 0.25 milliGRAM(s) Oral every 6 hours PRN agitation  
MEDICATIONS  (PRN):  acetaminophen   Tablet .. 650 milliGRAM(s) Oral every 6 hours PRN Mild Pain (1 - 3), Moderate Pain (4 - 6)  OLANZapine 1.25 milliGRAM(s) Oral every 24 hours PRN agitation  OLANZapine Injectable 1.25 milliGRAM(s) IntraMuscular once PRN severe agitation  
MEDICATIONS  (PRN):  acetaminophen   Tablet .. 650 milliGRAM(s) Oral every 6 hours PRN Mild Pain (1 - 3), Moderate Pain (4 - 6)  OLANZapine Injectable 1.25 milliGRAM(s) IntraMuscular once PRN severe agitation  risperiDONE   Tablet 0.25 milliGRAM(s) Oral every 6 hours PRN agitation  

## 2021-01-14 NOTE — BH INPATIENT PSYCHIATRY PROGRESS NOTE - MSE OPTIONS
Structured MSE
Unstructured MSE
Structured MSE
Unstructured MSE
Structured MSE

## 2021-01-14 NOTE — BH INPATIENT PSYCHIATRY PROGRESS NOTE - NSTXDCOTHRGOAL_PSY_ALL_CORE
Complex treatment disposition
Complex treatment disposition.  Pt continue to improve with a reduction of her symptoms, allowing her to better self-care.
Complex treatment disposition.  Pt will be able to initiate her self-care, and participate in DC planning without paranoia
Complex treatment disposition.  Pt will be able tolerate care, and interactions x 7 days with treatment of her paranoia
Complex treatment disposition
Complex treatment disposition
Complex treatment disposition. Pt will be able to participate in her care and follow up with resolution of her paranoia
Complex treatment disposition.  Pt continue to improve with a reduction of her symptoms, allowing her to better self-care.
Complex treatment disposition.  Pt will be able to initiate her self-care, and participate in DC planning without paranoia
Complex treatment disposition.  Pt will be able to initiate her self-care, and participate in DC planning without paranoia
Complex treatment disposition
Complex treatment disposition. Pt will be able to participate in her care and follow up with resolution of her paranoia
Complex treatment disposition
Complex treatment disposition.  Pt will be able tolerate care, and interactions x 7 days with treatment of her paranoia
Complex treatment disposition.  Pt will be able tolerate care, and interactions x 7 days with treatment of her paranoia
Complex treatment disposition.  Pt will be able to initiate her self-care, and participate in DC planning without paranoia
Complex treatment disposition
Complex treatment disposition.  Pt will be able tolerate care, and interactions x 7 days with treatment of her paranoia
Complex treatment disposition.  Pt continue to improve with a reduction of her symptoms, allowing her to better self-care.
Complex treatment disposition.  Pt will be able tolerate care, and interactions x 7 days with treatment of her paranoia
Complex treatment disposition.  Pt will be able tolerate care, and interactions x 7 days with treatment of her paranoia

## 2021-01-14 NOTE — BH INPATIENT PSYCHIATRY PROGRESS NOTE - NSTXCOPEDATEEST_PSY_ALL_CORE
24-Dec-2020
30-Dec-2020
30-Dec-2020
06-Jan-2021
30-Dec-2020
06-Jan-2021
13-Jan-2021
13-Jan-2021
24-Dec-2020
06-Jan-2021

## 2021-01-14 NOTE — BH INPATIENT PSYCHIATRY PROGRESS NOTE - NSTXFALLDATETRGT_PSY_ALL_CORE
31-Dec-2020
14-Jan-2021
31-Dec-2020
14-Jan-2021
31-Dec-2020
14-Jan-2021
14-Jan-2021
31-Dec-2020
14-Jan-2021

## 2021-01-14 NOTE — BH INPATIENT PSYCHIATRY PROGRESS NOTE - NSBHMETABOLIC_PSY_ALL_CORE_FT
BMI: BMI (kg/m2): 20.8 (12-19-20 @ 16:01)  HbA1c:   Glucose: POCT Blood Glucose.: 99 mg/dL (12-01-20 @ 14:23)    BP: 118/62 (12-27-20 @ 06:46) (118/62 - 118/62)  Lipid Panel: 
BMI: BMI (kg/m2): 20.8 (12-16-20 @ 17:15)  HbA1c:   Glucose: POCT Blood Glucose.: 99 mg/dL (12-01-20 @ 14:23)    BP: 110/- (12-17-20 @ 07:55) (110/- - 110/-)  Lipid Panel: 
BMI: BMI (kg/m2): 21.9 (01-02-21 @ 16:07)  HbA1c: A1C with Estimated Average Glucose Result: 5.5 % (12-29-20 @ 10:19)    Glucose: POCT Blood Glucose.: 99 mg/dL (12-01-20 @ 14:23)    BP: 139/52 (01-03-21 @ 08:14) (139/52 - 139/52)  Lipid Panel: Date/Time: 12-29-20 @ 10:19  Cholesterol, Serum: 209  Direct LDL: --  HDL Cholesterol, Serum: 33  Total Cholesterol/HDL Ration Measurement: --  Triglycerides, Serum: 380  
BMI: BMI (kg/m2): 20.8 (12-19-20 @ 16:01)  HbA1c:   Glucose: POCT Blood Glucose.: 99 mg/dL (12-01-20 @ 14:23)    BP: 144/57 (12-28-20 @ 07:01) (118/62 - 144/57)  Lipid Panel: 
BMI: BMI (kg/m2): 20.8 (12-16-20 @ 17:15)  HbA1c:   Glucose: POCT Blood Glucose.: 99 mg/dL (12-01-20 @ 14:23)    BP: 110/- (12-17-20 @ 07:55) (110/- - 110/-)  Lipid Panel: 
BMI: BMI (kg/m2): 20.8 (12-19-20 @ 16:01)  HbA1c:   Glucose: POCT Blood Glucose.: 99 mg/dL (12-01-20 @ 14:23)    BP: --  Lipid Panel: 
BMI: BMI (kg/m2): 20.8 (12-16-20 @ 17:15)  HbA1c:   Glucose: POCT Blood Glucose.: 99 mg/dL (12-01-20 @ 14:23)    BP: 110/- (12-17-20 @ 07:55) (110/- - 110/-)  Lipid Panel: 
BMI: BMI (kg/m2): 20.8 (12-19-20 @ 16:01)  HbA1c:   Glucose: POCT Blood Glucose.: 99 mg/dL (12-01-20 @ 14:23)    BP: --  Lipid Panel: 
BMI: BMI (kg/m2): 21.9 (01-02-21 @ 16:07)  HbA1c: A1C with Estimated Average Glucose Result: 5.5 % (12-29-20 @ 10:19)    Glucose: POCT Blood Glucose.: 99 mg/dL (12-01-20 @ 14:23)    BP: --  Lipid Panel: Date/Time: 12-29-20 @ 10:19  Cholesterol, Serum: 209  Direct LDL: --  HDL Cholesterol, Serum: 33  Total Cholesterol/HDL Ration Measurement: --  Triglycerides, Serum: 380  
BMI: BMI (kg/m2): 21.9 (01-02-21 @ 16:07)  HbA1c: A1C with Estimated Average Glucose Result: 5.5 % (12-29-20 @ 10:19)    Glucose: POCT Blood Glucose.: 99 mg/dL (12-01-20 @ 14:23)    BP: 139/52 (01-03-21 @ 08:14) (139/52 - 139/52)  Lipid Panel: Date/Time: 12-29-20 @ 10:19  Cholesterol, Serum: 209  Direct LDL: --  HDL Cholesterol, Serum: 33  Total Cholesterol/HDL Ration Measurement: --  Triglycerides, Serum: 380  
BMI: BMI (kg/m2): 20.8 (12-19-20 @ 16:01)  HbA1c: A1C with Estimated Average Glucose Result: 5.5 % (12-29-20 @ 10:19)    Glucose: POCT Blood Glucose.: 99 mg/dL (12-01-20 @ 14:23)    BP: --  Lipid Panel: Date/Time: 12-29-20 @ 10:19  Cholesterol, Serum: 209  Direct LDL: --  HDL Cholesterol, Serum: 33  Total Cholesterol/HDL Ration Measurement: --  Triglycerides, Serum: 380  
BMI: BMI (kg/m2): 20.8 (12-19-20 @ 16:01)  HbA1c: A1C with Estimated Average Glucose Result: 5.5 % (12-29-20 @ 10:19)    Glucose: POCT Blood Glucose.: 99 mg/dL (12-01-20 @ 14:23)    BP: 144/57 (12-28-20 @ 07:01) (144/57 - 144/57)  Lipid Panel: Date/Time: 12-29-20 @ 10:19  Cholesterol, Serum: 209  Direct LDL: --  HDL Cholesterol, Serum: 33  Total Cholesterol/HDL Ration Measurement: --  Triglycerides, Serum: 380  
BMI: BMI (kg/m2): 21.9 (01-02-21 @ 16:07)  HbA1c: A1C with Estimated Average Glucose Result: 5.5 % (12-29-20 @ 10:19)    Glucose: POCT Blood Glucose.: 99 mg/dL (12-01-20 @ 14:23)    BP: --  Lipid Panel: Date/Time: 12-29-20 @ 10:19  Cholesterol, Serum: 209  Direct LDL: --  HDL Cholesterol, Serum: 33  Total Cholesterol/HDL Ration Measurement: --  Triglycerides, Serum: 380  
BMI: BMI (kg/m2): 20.8 (12-19-20 @ 16:01)  HbA1c:   Glucose: POCT Blood Glucose.: 99 mg/dL (12-01-20 @ 14:23)    BP: 144/57 (12-28-20 @ 07:01) (118/62 - 144/57)  Lipid Panel: 
BMI: BMI (kg/m2): 21.9 (01-02-21 @ 16:07)  HbA1c: A1C with Estimated Average Glucose Result: 5.5 % (12-29-20 @ 10:19)    Glucose: POCT Blood Glucose.: 99 mg/dL (12-01-20 @ 14:23)    BP: --  Lipid Panel: Date/Time: 12-29-20 @ 10:19  Cholesterol, Serum: 209  Direct LDL: --  HDL Cholesterol, Serum: 33  Total Cholesterol/HDL Ration Measurement: --  Triglycerides, Serum: 380  
BMI: BMI (kg/m2): 20.8 (12-19-20 @ 16:01)  HbA1c:   Glucose: POCT Blood Glucose.: 99 mg/dL (12-01-20 @ 14:23)    BP: --  Lipid Panel:

## 2021-01-14 NOTE — BH INPATIENT PSYCHIATRY PROGRESS NOTE - NSTXDEPRESPROGRES_PSY_ALL_CORE
Worsening
Improving
No Change
Improving
No Change
Improving
No Change
Improving
Improving

## 2021-01-14 NOTE — BH INPATIENT PSYCHIATRY PROGRESS NOTE - NSBHCONSULTIPREASON_PSY_A_CORE
danger to self; mental illness expected to respond to inpatient care

## 2021-01-14 NOTE — BH INPATIENT PSYCHIATRY PROGRESS NOTE - NSDCCRITERIA_PSY_ALL_CORE
Pt warrants different acuity of care

## 2021-01-14 NOTE — BH INPATIENT PSYCHIATRY PROGRESS NOTE - NSTXSLFCREPROGRES_PSY_ALL_CORE
No Change
No Change
Improving
No Change
Improving
No Change
Improving
Improving
No Change
Improving
No Change
Improving
Improving
No Change
Improving

## 2021-01-14 NOTE — BH INPATIENT PSYCHIATRY PROGRESS NOTE - NSTXPROBDEPRES_PSY_ALL_CORE
DEPRESSIVE SYMPTOMS

## 2021-01-14 NOTE — BH INPATIENT PSYCHIATRY PROGRESS NOTE - NSTXDEPRESGOAL_PSY_ALL_CORE
Exhibit improvements in self-grooming, hygiene, sleep and appetite
Exhibit improvements in self-grooming, hygiene, sleep and appetite
Report using a coping skill to overcome sadness and worry in order to socialize with peers daily
Report using a coping skill to overcome sadness and worry in order to socialize with peers daily
Will identify 2 coping skills that assist in improving mood
Exhibit improvements in self-grooming, hygiene, sleep and appetite
Will identify 2 coping skills that assist in improving mood
Exhibit improvements in self-grooming, hygiene, sleep and appetite
Exhibit improvements in self-grooming, hygiene, sleep and appetite
Report using a coping skill to overcome sadness and worry in order to socialize with peers daily
Will identify 2 coping skills that assist in improving mood
Report using a coping skill to overcome sadness and worry in order to socialize with peers daily
Will identify 2 coping skills that assist in improving mood
Will identify 2 coping skills that assist in improving mood
Report using a coping skill to overcome sadness and worry in order to socialize with peers daily
Exhibit improvements in self-grooming, hygiene, sleep and appetite
Report using a coping skill to overcome sadness and worry in order to socialize with peers daily
Report using a coping skill to overcome sadness and worry in order to socialize with peers daily
Will identify 2 coping skills that assist in improving mood
Exhibit improvements in self-grooming, hygiene, sleep and appetite

## 2021-01-14 NOTE — BH INPATIENT PSYCHIATRY PROGRESS NOTE - NSBHINPTBILLING_PSY_ALL_CORE
11650 - Inpatient Moderate Complexity
61602 - Inpatient Moderate Complexity
53739 - Inpatient Moderate Complexity
55225 - Inpatient Moderate Complexity
89555 - Inpatient Moderate Complexity
75897 - Inpatient Moderate Complexity
30268 - Inpatient Moderate Complexity
67191 - Inpatient Moderate Complexity
16368 - Inpatient Moderate Complexity
25333 - Inpatient Moderate Complexity
27391 - Inpatient Low Complexity
83191 - Inpatient Moderate Complexity
19315 - Inpatient Moderate Complexity
60023 - Inpatient Moderate Complexity
39574 - Inpatient Moderate Complexity
62446 - Inpatient Low Complexity
58771 - Inpatient Moderate Complexity
64138 - Inpatient Moderate Complexity
25242 - Inpatient Moderate Complexity
46180 - Inpatient Moderate Complexity
01691 - Inpatient Moderate Complexity
34649 - Inpatient Moderate Complexity
94097 - Inpatient Moderate Complexity
63854 - Inpatient Moderate Complexity

## 2021-01-14 NOTE — BH INPATIENT PSYCHIATRY PROGRESS NOTE - NSTXPSYCHOPROGRES_PSY_ALL_CORE
Improving
No Change
No Change
Improving
No Change
Improving
No Change
No Change
Improving
No Change
Improving
No Change

## 2021-01-14 NOTE — BH INPATIENT PSYCHIATRY PROGRESS NOTE - NSTXPROBSLFCRE_PSY_ALL_CORE
SELF-CARE DEFICIT

## 2021-01-14 NOTE — BH INPATIENT PSYCHIATRY PROGRESS NOTE - NSTXDCOPNODATEEST_PSY_ALL_CORE
05-Jan-2021
11-Jan-2021
23-Dec-2020
05-Jan-2021
29-Dec-2020
05-Jan-2021
11-Jan-2021
23-Dec-2020
05-Jan-2021
23-Dec-2020
11-Jan-2021
29-Dec-2020
23-Dec-2020
29-Dec-2020

## 2021-01-14 NOTE — BH INPATIENT PSYCHIATRY PROGRESS NOTE - NSTXDEPRESDATEEST_PSY_ALL_CORE
24-Dec-2020
17-Dec-2020
07-Jan-2021
24-Dec-2020
07-Jan-2021
24-Dec-2020
17-Dec-2020
17-Dec-2020
24-Dec-2020
07-Jan-2021
24-Dec-2020
24-Dec-2020
17-Dec-2020
07-Jan-2021
07-Jan-2021
24-Dec-2020
17-Dec-2020
17-Dec-2020
24-Dec-2020
24-Dec-2020
17-Dec-2020

## 2021-01-14 NOTE — BH INPATIENT PSYCHIATRY PROGRESS NOTE - NSTXSLFCREGOAL_PSY_ALL_CORE
Will adhere to prescribed diet 100% of the time
Will perform ADLs without assistance/prompts daily
Be able to demonstrate the ability to care for self in 2 areas such as feeding oneself and hygiene
Will perform ADLs without assistance/prompts daily
Be able to demonstrate the ability to care for self in 2 areas such as feeding oneself and hygiene
Will perform ADLs without assistance/prompts daily
Will perform ADLs without assistance/prompts daily
Will adhere to prescribed diet 100% of the time
Be able to demonstrate the ability to care for self in 2 areas such as feeding oneself and hygiene
Will perform ADLs without assistance/prompts daily
Be able to demonstrate the ability to care for self in 2 areas such as feeding oneself and hygiene
Will perform ADLs without assistance/prompts daily
Be able to demonstrate the ability to care for self in 2 areas such as feeding oneself and hygiene
Will perform ADLs without assistance/prompts daily

## 2021-01-14 NOTE — BH INPATIENT PSYCHIATRY PROGRESS NOTE - NSTXPROBDCOPNO_PSY_ALL_CORE
DISCHARGE ISSUE - NON-ADHERENT WITH OUTPATIENT SERVICES

## 2021-01-14 NOTE — BH INPATIENT PSYCHIATRY PROGRESS NOTE - NSTXDCOTHRPROGRES_PSY_ALL_CORE
No Change
Improving
No Change
No Change
Improving
No Change
Improving
No Change
No Change

## 2021-01-14 NOTE — BH DISCHARGE NOTE NURSING/SOCIAL WORK/PSYCH REHAB - PATIENT PORTAL LINK FT
You can access the FollowMyHealth Patient Portal offered by [facility] by registering at the following website: [email or web address]. By joining [employer]’s FollowMyHealth portal, you will also be able to view your health information using other applications (apps) compatible with our system.

## 2021-01-14 NOTE — BH INPATIENT PSYCHIATRY PROGRESS NOTE - NS ED [facility] REVIEW OF ED CHART AVAILABLE LABS REVIEWED
None available
None available
Yes
Yes
None available
Yes
None available
Yes
Yes
None available

## 2021-01-14 NOTE — BH INPATIENT PSYCHIATRY PROGRESS NOTE - NSTXSLFCREDATETRGT_PSY_ALL_CORE
31-Dec-2020
31-Dec-2020
14-Jan-2021
31-Dec-2020
27-Dec-2020
31-Dec-2020
31-Dec-2020
27-Dec-2020
14-Jan-2021
31-Dec-2020
14-Jan-2021
31-Dec-2020
14-Jan-2021
14-Jan-2021
31-Dec-2020

## 2021-01-14 NOTE — BH INPATIENT PSYCHIATRY PROGRESS NOTE - NSTXPSYCHODATEEST_PSY_ALL_CORE
17-Dec-2020
24-Dec-2020
07-Jan-2021
17-Dec-2020
24-Dec-2020
07-Jan-2021
24-Dec-2020
17-Dec-2020
24-Dec-2020
07-Jan-2021
24-Dec-2020
07-Jan-2021
24-Dec-2020
07-Jan-2021
24-Dec-2020

## 2021-01-14 NOTE — BH INPATIENT PSYCHIATRY PROGRESS NOTE - NSTXFALLDATENEW_PSY_ALL_CORE
21-Dec-2020

## 2021-01-14 NOTE — BH INPATIENT PSYCHIATRY PROGRESS NOTE - NSTXCOPEDATETRGT_PSY_ALL_CORE
20-Jan-2021
06-Jan-2021
24-Dec-2020
31-Dec-2020
13-Jan-2021
31-Dec-2020
24-Dec-2020
24-Dec-2020
13-Jan-2021
24-Dec-2020
24-Dec-2020
06-Jan-2021
06-Jan-2021
31-Dec-2020
13-Jan-2021
13-Jan-2021
24-Dec-2020
31-Dec-2020
31-Dec-2020
20-Jan-2021
31-Dec-2020
24-Dec-2020
13-Jan-2021

## 2021-01-14 NOTE — BH INPATIENT PSYCHIATRY PROGRESS NOTE - NSICDXBHTERTIARYDX_PSY_ALL_CORE
Psychotic disorder   F29  
MDD (major depressive disorder), single episode, severe with psychotic features   F32.3  
Psychotic disorder   F29  
MDD (major depressive disorder), single episode, severe with psychotic features   F32.3  
Psychotic disorder   F29  
MDD (major depressive disorder), single episode, severe with psychotic features   F32.3  
MDD (major depressive disorder), single episode, severe with psychotic features   F32.3

## 2021-01-14 NOTE — BH INPATIENT PSYCHIATRY PROGRESS NOTE - NSBHCONSDANGERSELF_PSY_A_CORE
unable to care for self
suicidal behavior
unable to care for self

## 2021-01-14 NOTE — BH INPATIENT PSYCHIATRY PROGRESS NOTE - NSTXDCOPNOPROGRES_PSY_ALL_CORE
No Change
Improving
No Change
Improving
No Change
No Change

## 2021-01-14 NOTE — BH INPATIENT PSYCHIATRY PROGRESS NOTE - NSBHFUPINTERVALHXFT_PSY_A_CORE
Pt slept well, denies sad mood, not feeling anxious. Looking forward to discharge tomorrow, spending time with her sons, and easing back into a daily routine. Not spontaneously talking about worries regarding finances, reports she feels her children are handling things for her. Pt reports overall improvement from admission and is appreciative of care received.

## 2021-01-14 NOTE — BH INPATIENT PSYCHIATRY PROGRESS NOTE - NSTXPSYCHODATETRGT_PSY_ALL_CORE
31-Dec-2020
31-Dec-2020
14-Jan-2021
14-Jan-2021
27-Dec-2020
27-Dec-2020
31-Dec-2020
27-Dec-2020
31-Dec-2020
14-Jan-2021
31-Dec-2020
14-Jan-2021
31-Dec-2020
27-Dec-2020
31-Dec-2020
14-Jan-2021
27-Dec-2020

## 2021-01-14 NOTE — BH INPATIENT PSYCHIATRY PROGRESS NOTE - NSTXPROBCOPE_PSY_ALL_CORE
COPING, INEFFECTIVE

## 2021-01-14 NOTE — BH INPATIENT PSYCHIATRY PROGRESS NOTE - NSBHCHARTREVIEWVS_PSY_A_CORE FT
Vital Signs Last 24 Hrs  T(C): 36.7 (14 Jan 2021 10:52), Max: 36.8 (13 Jan 2021 22:35)  T(F): 98.1 (14 Jan 2021 10:52), Max: 98.3 (13 Jan 2021 22:35)  HR: --  BP: --  BP(mean): --  RR: --  SpO2: --

## 2021-01-14 NOTE — BH INPATIENT PSYCHIATRY PROGRESS NOTE - NSTXFALLGOAL_PSY_ALL_CORE
Ask for assistance when getting out of bed/chair and upon ambulation
Use non-slip footwear when out of bed
Verbalize understanding of fall prevention interventions
Ask for assistance when getting out of bed/chair and upon ambulation
Ask for assistance when getting out of bed/chair and upon ambulation
Verbalize understanding of fall prevention interventions
Use non-slip footwear when out of bed
Verbalize understanding of fall prevention interventions
Verbalize understanding of fall prevention interventions
Use non-slip footwear when out of bed
Verbalize understanding of fall prevention interventions

## 2021-01-14 NOTE — BH INPATIENT PSYCHIATRY PROGRESS NOTE - NSTXPROBPSYCHO_PSY_ALL_CORE
PSYCHOTIC SYMPTOMS

## 2021-01-14 NOTE — BH INPATIENT PSYCHIATRY PROGRESS NOTE - NSTXDCOPNODATETRGT_PSY_ALL_CORE
18-Jan-2021
30-Dec-2020
05-Jan-2021
12-Jan-2021
18-Jan-2021
12-Jan-2021
30-Dec-2020
05-Jan-2021
30-Dec-2020
18-Jan-2021
12-Jan-2021
12-Jan-2021
30-Dec-2020

## 2021-01-14 NOTE — BH INPATIENT PSYCHIATRY PROGRESS NOTE - NSTXPROBFALL_PSY_ALL_CORE
FALL RISK

## 2021-01-14 NOTE — BH INPATIENT PSYCHIATRY PROGRESS NOTE - NSTXFALLPROGRES_PSY_ALL_CORE
Improving

## 2021-01-14 NOTE — BH INPATIENT PSYCHIATRY PROGRESS NOTE - NSTXDCOTHRDATEEST_PSY_ALL_CORE
05-Jan-2021
17-Dec-2020
23-Dec-2020
05-Jan-2021
17-Dec-2020
11-Jan-2021
29-Dec-2020
05-Jan-2021
11-Jan-2021
23-Dec-2020
29-Dec-2020
17-Dec-2020
23-Dec-2020
11-Jan-2021
17-Dec-2020
05-Jan-2021

## 2021-01-14 NOTE — BH INPATIENT PSYCHIATRY PROGRESS NOTE - NSCGIIMPROVESX_PSY_ALL_CORE

## 2021-01-14 NOTE — BH INPATIENT PSYCHIATRY PROGRESS NOTE - NSTXFALLDATEEST_PSY_ALL_CORE
24-Dec-2020
07-Jan-2021
21-Dec-2020
21-Dec-2020
24-Dec-2020
07-Jan-2021
24-Dec-2020
07-Jan-2021
24-Dec-2020
07-Jan-2021
24-Dec-2020
24-Dec-2020
07-Jan-2021
24-Dec-2020
21-Dec-2020

## 2021-01-14 NOTE — BH INPATIENT PSYCHIATRY PROGRESS NOTE - NSTXDCOPNOGOAL_PSY_ALL_CORE
Will agree to participate in appropriate outpatient care

## 2021-01-14 NOTE — BH INPATIENT PSYCHIATRY PROGRESS NOTE - MSE UNSTRUCTURED FT
Older woman casually dressed, fair grooming and hygiene. Calm and cooperative, smiling appropriately; no psychomotor disturbance; speech - regular rate, volume, tone, productive; Mood is  "good" affect bright, reactive; appropriate;  thought process linear, thought content - future oriented; no overt delusions elicited, denying SI/HI,  No hallucinations elicited. Insight and judgment are fair. Good impulse control

## 2021-01-14 NOTE — BH INPATIENT PSYCHIATRY PROGRESS NOTE - NSBHCHARTREVIEWINVESTIGATE_PSY_A_CORE FT
< from: 12 Lead ECG (12.11.20 @ 14:07) >      Ventricular Rate 78 BPM    Atrial Rate 78 BPM    P-R Interval 134 ms    QRS Duration 82 ms    Q-T Interval 408 ms    QTC Calculation(Bazett) 465 ms    P Axis 27 degrees    R Axis -36 degrees    T Axis 64 degrees    Diagnosis Line Normal sinus rhythm  Left axis deviation  RSR' or QR pattern in V1 suggests right ventricular conduction delay  Nonspecific ST and T wave abnormality  Abnormal ECG    < end of copied text >    

## 2021-01-14 NOTE — BH DISCHARGE NOTE NURSING/SOCIAL WORK/PSYCH REHAB - NSDCPRRECOMMEND_PSY_ALL_CORE
Writer encouraged patient to maintain medication compliance and outpatient appointments. Writer also encouraged patient to participate in structured social and leisure activities.

## 2021-01-14 NOTE — BH INPATIENT PSYCHIATRY PROGRESS NOTE - NSBHCONSBHPROVDETAILS_PSY_A_CORE  FT
CL team was contacted to discuss case

## 2021-01-14 NOTE — BH INPATIENT PSYCHIATRY PROGRESS NOTE - NSBHCONTPROVIDER_PSY_ALL_CORE
No...
No...
Yes...
No...
Yes...
No...

## 2021-01-14 NOTE — BH INPATIENT PSYCHIATRY PROGRESS NOTE - NSBHATTESTSEENBY_PSY_A_CORE
attending Psychiatrist without NP/Trainee
Attending Psychiatrist supervising NP/Trainee, meeting pt...
attending Psychiatrist without NP/Trainee
Attending Psychiatrist supervising NP/Trainee, meeting pt...
attending Psychiatrist without NP/Trainee
Attending Psychiatrist supervising NP/Trainee, meeting pt...

## 2021-01-14 NOTE — BH INPATIENT PSYCHIATRY PROGRESS NOTE - NSBHFUPINTERVALCCFT_PSY_A_CORE
I may lose my home.  Reports feeling light headed all the time denies relation to standing up. Denies GI upset. 
"I am a bit down."
"I sometimes feel I don't want to be here."
"I will need my sons to pick me up."
"I won't be here long-term."
"I'm like the weather, a little down."
depression
depression
"I'm homeless."
depression
"They might be trying to take my house."
"I am ok, just feel like I can't breathe sometimes"
depression
"I am fine, but I am still depressed, and anxious"
depression
"Sometimes I think that."
depression
"They aren't going to take me home."
Patient reports vague dizziness but not from standing, says meds not helping her sleep because they are not real meds
"I am tired, depressed, anxious"
psychosis
"It's hard to focus with all that noise."
depression
Patient reports vague dizziness but not from standing, says meds not helping her sleep because they are not real meds

## 2021-01-14 NOTE — BH INPATIENT PSYCHIATRY PROGRESS NOTE - NSCGISEVERILLNESS_PSY_ALL_CORE
6 = Severely ill - disruptive pathology, behavior and function are frequently influenced by symptoms, may require assistance from others
5 = Markedly ill - intrusive symptoms that distinctly impair social/occupational function or cause intrusive levels of distress
6 = Severely ill - disruptive pathology, behavior and function are frequently influenced by symptoms, may require assistance from others
5 = Markedly ill - intrusive symptoms that distinctly impair social/occupational function or cause intrusive levels of distress
6 = Severely ill - disruptive pathology, behavior and function are frequently influenced by symptoms, may require assistance from others
6 = Severely ill - disruptive pathology, behavior and function are frequently influenced by symptoms, may require assistance from others
5 = Markedly ill - intrusive symptoms that distinctly impair social/occupational function or cause intrusive levels of distress
6 = Severely ill - disruptive pathology, behavior and function are frequently influenced by symptoms, may require assistance from others
6 = Severely ill - disruptive pathology, behavior and function are frequently influenced by symptoms, may require assistance from others
5 = Markedly ill - intrusive symptoms that distinctly impair social/occupational function or cause intrusive levels of distress
6 = Severely ill - disruptive pathology, behavior and function are frequently influenced by symptoms, may require assistance from others
6 = Severely ill - disruptive pathology, behavior and function are frequently influenced by symptoms, may require assistance from others
5 = Markedly ill - intrusive symptoms that distinctly impair social/occupational function or cause intrusive levels of distress

## 2021-01-14 NOTE — BH DISCHARGE NOTE NURSING/SOCIAL WORK/PSYCH REHAB - NSDCPRGOAL_PSY_ALL_CORE
Patient initially presented with symptoms of depression, anxiety, paranoia, passive suicidal ideations and decreased daily functioning. Patient's initial goals included increasing her hope in recovery, increasing her coping skills, decreasing anxiety and increasing daily functioning.  Patient made gains towards her rehab goals as evidenced by patient's brighter affect, denial of passive suicidal ideations, utilization of coping skills, decreased anxiety and participation in rehab groups.    ***Patient completed a self-rating and a [employer] survey.

## 2021-01-14 NOTE — BH INPATIENT PSYCHIATRY PROGRESS NOTE - NSBHMETABOLICLABS_PSY_ALL_CORE
Labs within last 12 months

## 2021-01-14 NOTE — BH INPATIENT PSYCHIATRY PROGRESS NOTE - NSTXCOPEPROGRES_PSY_ALL_CORE
No Change
Improving
Improving
No Change
Improving
No Change
Improving
No Change
Improving
No Change

## 2021-01-14 NOTE — BH INPATIENT PSYCHIATRY PROGRESS NOTE - NSTXDEPRESDATETRGT_PSY_ALL_CORE
31-Dec-2020
31-Dec-2020
14-Jan-2021
27-Dec-2020
31-Dec-2020
14-Jan-2021
31-Dec-2020
14-Jan-2021
31-Dec-2020
27-Dec-2020
31-Dec-2020
31-Dec-2020
14-Jan-2021
27-Dec-2020
14-Jan-2021

## 2021-01-14 NOTE — BH INPATIENT PSYCHIATRY PROGRESS NOTE - NSTXPROBDCOTHR_PSY_ALL_CORE
DISCHARGE ISSUE - OTHER

## 2021-01-14 NOTE — BH INPATIENT PSYCHIATRY PROGRESS NOTE - NSTXSLFCREDATEEST_PSY_ALL_CORE
17-Dec-2020
17-Dec-2020
24-Dec-2020
17-Dec-2020
24-Dec-2020
17-Dec-2020
17-Dec-2020
24-Dec-2020
07-Jan-2021
24-Dec-2020
17-Dec-2020
07-Jan-2021
07-Jan-2021
24-Dec-2020
07-Jan-2021
24-Dec-2020
07-Jan-2021
17-Dec-2020
24-Dec-2020

## 2021-01-14 NOTE — BH INPATIENT PSYCHIATRY PROGRESS NOTE - NSTXDCOTHRDATETRGT_PSY_ALL_CORE
30-Dec-2020
12-Jan-2021
12-Jan-2021
05-Jan-2021
12-Jan-2021
12-Jan-2021
18-Jan-2021
30-Dec-2020
05-Jan-2021
30-Dec-2020
30-Dec-2020
18-Jan-2021
18-Jan-2021
30-Dec-2020

## 2021-01-14 NOTE — BH INPATIENT PSYCHIATRY PROGRESS NOTE - NSTXPSYCHOGOAL_PSY_ALL_CORE
Will verbalize 1 strategy to successfully cope with psychotic symptoms
Will engage in a 15 minute conversation with no irrational content
Other...
Will verbalize 1 strategy to successfully cope with psychotic symptoms
Will verbalize 1 strategy to successfully cope with psychotic symptoms
Will identify 1 trigger/stressor that exacerbates hallucinations
Will report using relaxation skills 3 times a day to reduce anxiety about delusions/hallucinations
Will engage in a 15 minute conversation with no irrational content
Will report using relaxation skills 3 times a day to reduce anxiety about delusions/hallucinations
Will verbalize 1 strategy to successfully cope with psychotic symptoms
Will engage in a 15 minute conversation with no irrational content
Will verbalize 1 strategy to successfully cope with psychotic symptoms
Will engage in a 15 minute conversation with no irrational content
Other...
Will engage in a 15 minute conversation with no irrational content
Will verbalize 1 strategy to successfully cope with psychotic symptoms
State that he/she is only about 50% sure of the certainty of the delusions instead of 100% certain
Will verbalize 1 strategy to successfully cope with psychotic symptoms

## 2021-01-14 NOTE — BH INPATIENT PSYCHIATRY PROGRESS NOTE - NSTREATMENTCERTY_PSY_ALL_CORE

## 2021-01-14 NOTE — BH INPATIENT PSYCHIATRY PROGRESS NOTE - NSTXCOPEGOAL_PSY_ALL_CORE
Identify and utilize 2 coping skills that meet their needs

## 2021-01-15 VITALS — TEMPERATURE: 98 F

## 2021-01-15 PROCEDURE — 99238 HOSP IP/OBS DSCHRG MGMT 30/<: CPT

## 2021-01-15 RX ADMIN — Medication 5 MILLILITER(S): at 09:07

## 2021-01-22 ENCOUNTER — OUTPATIENT (OUTPATIENT)
Dept: OUTPATIENT SERVICES | Facility: HOSPITAL | Age: 81
LOS: 1 days | Discharge: HOME | End: 2021-01-22

## 2021-01-22 DIAGNOSIS — Z90.710 ACQUIRED ABSENCE OF BOTH CERVIX AND UTERUS: Chronic | ICD-10-CM

## 2021-01-27 NOTE — ED PROVIDER NOTE - NSTIMEPROVIDERCAREINITIATE_GEN_ER
02-Jul-2017 07:20 Closure 2 Information: This tab is for additional flaps and grafts, including complex repair and grafts and complex repair and flaps. You can also specify a different location for the additional defect, if the location is the same you do not need to select a new one. We will insert the automated text for the repair you select below just as we do for solitary flaps and grafts. Please note that at this time if you select a location with a different insurance zone you will need to override the ICD10 and CPT if appropriate.

## 2021-04-26 ENCOUNTER — APPOINTMENT (OUTPATIENT)
Dept: DISASTER EMERGENCY | Facility: OTHER | Age: 81
End: 2021-04-26
Payer: MEDICARE

## 2021-04-26 PROCEDURE — 0002A: CPT

## 2021-07-15 NOTE — DISCHARGE NOTE PROVIDER - NSDCDCMDCOMP_GEN_ALL_CORE
Detail Level: Zone Initiate Treatment: Triamcinolone 0.1% ointment BID Initiate Treatment: Hydrocortisone 2.5% cream to face BID, Ketoconazole 2% shampoo This document is complete and the patient is ready for discharge. Plan: Will do lesions on right axilla at next visit. They are large and rubbing.

## 2021-07-19 ENCOUNTER — APPOINTMENT (OUTPATIENT)
Dept: GASTROENTEROLOGY | Facility: CLINIC | Age: 81
End: 2021-07-19
Payer: MEDICARE

## 2021-07-19 VITALS
BODY MASS INDEX: 23.19 KG/M2 | OXYGEN SATURATION: 96 % | DIASTOLIC BLOOD PRESSURE: 90 MMHG | SYSTOLIC BLOOD PRESSURE: 154 MMHG | WEIGHT: 126 LBS | TEMPERATURE: 98.2 F | HEIGHT: 62 IN | HEART RATE: 78 BPM

## 2021-07-19 DIAGNOSIS — R19.4 CHANGE IN BOWEL HABIT: ICD-10-CM

## 2021-07-19 PROCEDURE — 99204 OFFICE O/P NEW MOD 45 MIN: CPT

## 2021-07-19 RX ORDER — MIRTAZAPINE 45 MG/1
45 TABLET, FILM COATED ORAL
Refills: 0 | Status: ACTIVE | COMMUNITY

## 2021-07-19 NOTE — REVIEW OF SYSTEMS
[As Noted in HPI] : as noted in HPI [Abdominal Pain] : abdominal pain [Constipation] : constipation [Diarrhea] : diarrhea [Negative] : Heme/Lymph [FreeTextEntry7] : Abdominal Bloating

## 2021-07-19 NOTE — HISTORY OF PRESENT ILLNESS
[Heartburn] : denies heartburn [Nausea] : stable nausea [Vomiting] : denies vomiting [Diarrhea] : resolved diarrhea [Constipation] : constipation worsened [Yellow Skin Or Eyes (Jaundice)] : denies jaundice [Abdominal Pain] : stable abdominal pain [Abdominal Swelling] : denies abdominal swelling [Rectal Pain] : denies rectal pain [Irritable Bowel Syndrome] : irritable bowel syndrome [Cholecystectomy] : cholecystectomy [GERD] : no gastroesophageal reflux disease [Hiatus Hernia] : no hiatus hernia [Peptic Ulcer Disease] : no peptic ulcer disease [Pancreatitis] : no pancreatitis [Cholelithiasis] : no cholelithiasis [Kidney Stone] : no kidney stone [Inflammatory Bowel Disease] : no inflammatory bowel disease [Diverticulitis] : no diverticulitis [Alcohol Abuse] : no alcohol abuse [Malignancy] : no malignancy [Abdominal Surgery] : no abdominal surgery [Appendectomy] : no appendectomy [de-identified] : 80 year old female accompanied by son Khoi presents with complaints of abdominal bloating and intermittent abdominal cramping ongoing for over 1 year. Patient states she was constipated for 2 days took a stool softner and developed diarrhea shortly there after. Since Saturday diarrhea has resolved and now she is constipated again. She states she was afraid to eat due the potential for diarrhea. Per patient last Colonoscopy was over 5 years ago with Benign Polyps. EGD was last year with Dr Hoang. Patient was asked to provide copy of reports and states she does not want to ask for copy as she is here for second opinion. Patient was hospitalized at [facility] for 35 days r/t Psychosis. She is currently on Mirtazapine 45 mg. She is followed and seen by psych regularly. She denies rectal bleeding, blood in the stool, melena, or hematemesis.\par CT Scan of the Abdomen and Pelvis preformed 12/2020 unremarkable.

## 2021-07-19 NOTE — PHYSICAL EXAM
[General Appearance - Alert] : alert [General Appearance - In No Acute Distress] : in no acute distress [Sclera] : the sclera and conjunctiva were normal [PERRL With Normal Accommodation] : pupils were equal in size, round, and reactive to light [Extraocular Movements] : extraocular movements were intact [Outer Ear] : the ears and nose were normal in appearance [Oropharynx] : the oropharynx was normal [Neck Appearance] : the appearance of the neck was normal [Neck Cervical Mass (___cm)] : no neck mass was observed [Jugular Venous Distention Increased] : there was no jugular-venous distention [Thyroid Diffuse Enlargement] : the thyroid was not enlarged [Thyroid Nodule] : there were no palpable thyroid nodules [] : no respiratory distress [Auscultation Breath Sounds / Voice Sounds] : lungs were clear to auscultation bilaterally [Heart Rate And Rhythm] : heart rate was normal and rhythm regular [Heart Sounds] : normal S1 and S2 [Heart Sounds Gallop] : no gallops [Murmurs] : no murmurs [Heart Sounds Pericardial Friction Rub] : no pericardial rub [Obese] : obese [Normal] : normal to percussion [Periumbilical] : in the periumbilical area [Cervical Lymph Nodes Enlarged Posterior Bilaterally] : posterior cervical [Cervical Lymph Nodes Enlarged Anterior Bilaterally] : anterior cervical [Supraclavicular Lymph Nodes Enlarged Bilaterally] : supraclavicular [Axillary Lymph Nodes Enlarged Bilaterally] : axillary [Femoral Lymph Nodes Enlarged Bilaterally] : femoral [Inguinal Lymph Nodes Enlarged Bilaterally] : inguinal [No CVA Tenderness] : no ~M costovertebral angle tenderness [No Spinal Tenderness] : no spinal tenderness [Abnormal Walk] : normal gait [Nail Clubbing] : no clubbing  or cyanosis of the fingernails [Musculoskeletal - Swelling] : no joint swelling seen [Motor Tone] : muscle strength and tone were normal [Deep Tendon Reflexes (DTR)] : deep tendon reflexes were 2+ and symmetric [Sensation] : the sensory exam was normal to light touch and pinprick [No Focal Deficits] : no focal deficits [Oriented To Time, Place, And Person] : oriented to person, place, and time [Impaired Insight] : insight and judgment were intact [Affect] : the affect was normal [Firm] : not firm [Rigid] : not rigid [Rebound] : no rebound [Guarding] : no guarding [Ruff's] : a negative Ruff's sign

## 2021-07-19 NOTE — ASSESSMENT
[FreeTextEntry1] : Attending Attestation\par  \par Constipation \par \par The causes of constipation were discussed at length We discussed : Eat three meals each day. Do not\par skip meals. Gradually increase the amount of FIBER in your diet. Choose more whole grain breads,\par cereals and rice. Select more raw fruits and vegetables eat the peel, if appropriate. Read food labels\par and look for the "dietary fiber" content of foods. Good sources have 2 grams of fiber or more. Drink six\par to eight glasses of water each day. Limit highly refined and processed foods.\par \par As per Dr Saucedo recommendation she will begin taking Probiotic daily and Benefiber. Medications reviewed side effects and adverse reactions. \par \par FOBT test ordered instructions provided to patient how to obtain sample and where to return specimen. \par \par Patient will f/u with Dr Saucedo in 2 weeks in [facility] location as per patient [place] is too far of a communicate from [place]. \par \par At that visit patient will discuss with Dr Saucedo scheduling a Screening Colonoscopy. \par \par Patient and Son Khoi both verbalized understanding of all information provided. All questions answered and reviewed. \par \par

## 2021-07-22 NOTE — HISTORY OF PRESENT ILLNESS
[FreeTextEntry1] : Mrs Bautista presents for Comprehensive Mare Eval accompanied by her sons Khoi and Altaf.\par The patient has a scant medical  history. About 3-4 months ago family noted an abrupt change in the patient, around the time that she received a bank statements from [employer]. The patient became suspicious that they were stealing her money. Since then she has become increasingly paranoid. She is convinced that people are poisoning her food, resulting in decreased p.o. intake and weight loss. She insists she sees little yellow "stuff" in her food or which comes out in her urine. She states that what they put her in her food keeps her from moving her bowels, that she's not had a bowel movement in 2 or 3 weeks and that she tries to manually remove stool but all she finds is yellow "stuff". Her sons bring her food which she throws away. Patient states that the police are coming to take her away because of the bad things she's done in the past, however she refused to say what she's done. The patient was seen in the [facility] ED on December 1 following a fall. Except for some mild hyponatremia, the remainder of her lab work as well as CT head and CT abdomen and pelvis were unremarkable. \par Until a few months ago the patient was driving and living independently. 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 She has no history of drug or alcohol use and smoked one to 2 cigarettes daily but stopped over 30 years ago.\par Her family describes her as a loner, always obsessed about lack of sleep. Prior to the onset of symptoms she was not noted to have any memory impairment. She has also developed some stuttering in the past few months he General

## 2021-08-05 ENCOUNTER — APPOINTMENT (OUTPATIENT)
Dept: CARDIOLOGY | Facility: CLINIC | Age: 81
End: 2021-08-05

## 2021-08-10 ENCOUNTER — APPOINTMENT (OUTPATIENT)
Dept: GASTROENTEROLOGY | Facility: CLINIC | Age: 81
End: 2021-08-10

## 2021-08-11 LAB — HEMOCCULT STL QL IA: NEGATIVE

## 2021-08-16 ENCOUNTER — APPOINTMENT (OUTPATIENT)
Dept: GASTROENTEROLOGY | Facility: CLINIC | Age: 81
End: 2021-08-16
Payer: MEDICARE

## 2021-08-16 VITALS
DIASTOLIC BLOOD PRESSURE: 92 MMHG | OXYGEN SATURATION: 95 % | BODY MASS INDEX: 23 KG/M2 | HEART RATE: 92 BPM | SYSTOLIC BLOOD PRESSURE: 149 MMHG | WEIGHT: 125 LBS | TEMPERATURE: 97.7 F | HEIGHT: 62 IN

## 2021-08-16 DIAGNOSIS — K59.00 CONSTIPATION, UNSPECIFIED: ICD-10-CM

## 2021-08-16 DIAGNOSIS — R10.9 UNSPECIFIED ABDOMINAL PAIN: ICD-10-CM

## 2021-08-16 PROCEDURE — 99214 OFFICE O/P EST MOD 30 MIN: CPT

## 2021-08-16 NOTE — PHYSICAL EXAM
[General Appearance - Alert] : alert [General Appearance - In No Acute Distress] : in no acute distress [Neck Appearance] : the appearance of the neck was normal [Neck Cervical Mass (___cm)] : no neck mass was observed [Jugular Venous Distention Increased] : there was no jugular-venous distention [Thyroid Diffuse Enlargement] : the thyroid was not enlarged [Thyroid Nodule] : there were no palpable thyroid nodules [Auscultation Breath Sounds / Voice Sounds] : lungs were clear to auscultation bilaterally [Heart Sounds] : normal S1 and S2 [Heart Rate And Rhythm] : heart rate was normal and rhythm regular [Heart Sounds Gallop] : no gallops [Murmurs] : no murmurs [Heart Sounds Pericardial Friction Rub] : no pericardial rub [Bowel Sounds] : normal bowel sounds [Abdomen Soft] : soft [Abdomen Tenderness] : non-tender [] : no hepato-splenomegaly [Abdomen Mass (___ Cm)] : no abdominal mass palpated [Cervical Lymph Nodes Enlarged Anterior Bilaterally] : anterior cervical [Cervical Lymph Nodes Enlarged Posterior Bilaterally] : posterior cervical [Supraclavicular Lymph Nodes Enlarged Bilaterally] : supraclavicular [No CVA Tenderness] : no ~M costovertebral angle tenderness [No Spinal Tenderness] : no spinal tenderness

## 2021-08-16 NOTE — ASSESSMENT
[FreeTextEntry1] : Constipation improved with Probiotic. Dietary and lifestyle modification discussed with the patient.\par Screening colonoscopy- she is due for a screening colonoscopy. discussed colon cancer screening. She is going to have the procedure done by a gastroenterologist closer to her home in [place].

## 2021-08-16 NOTE — HISTORY OF PRESENT ILLNESS
[de-identified] : 80 year old woman originally seen for abdominal pain, bloating and constipation. She is feeling better on a Probiotic. She is due for a screening colonoscopy. She denies rectal bleeding, melena or hematemesis.

## 2022-01-13 NOTE — ED BEHAVIORAL HEALTH ASSESSMENT NOTE - OTHER PAST PSYCHIATRIC HISTORY (INCLUDE DETAILS REGARDING ONSET, COURSE OF ILLNESS, INPATIENT/OUTPATIENT TREATMENT)
[address] [facility]  PHYSICAL THERAPY  [] EVALUATION  [x] DAILY NOTE (LAND) [] DAILY NOTE (AQUATIC ) [] PROGRESS NOTE [] DISCHARGE NOTE    [] [address] 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]  Radiculopathy, lumbar region [M54.16]    Treatment Diagnosis Difficulty walking    Date of Evaluation 21    Additional Pertinent History Bi-polar; SOB related to CHF      Functional Outcome Measure Used Tinetti   Functional Outcome Score 15/28 (21)    (21)   (21)   (10/29/21)   (21)   (21)      Insurance: Primary: Payor: Chin Pena /  /  / , aquatic therapy is covered; modalities covered except for IONTO  Secondary:    Authorization Information:  RECEIVED [address] OF 12 VISITS  FROM 22 TO 22  CPT CODES:  15914, [id number], [facility], [address] [place] [address] Ave [address]. #[id number]   Visit #  4,  for progress note   Visits Allowed: 12   Recertification Date:    Physician Follow-Up:    Physician Orders:    History of Present Illness: Gemma Sweeney is referred to PT to address ongoing balance issues. She states that she noticed that she was struggling with her balance when she slipped off a short deck at a local [facility]. She admits that over the last few months she has also had a few falls; denies any major injury as a result of the falls. She started to workout with a local  a month hoping that it would help her balance. SUBJECTIVE: Reports increased stiffness today.     TREATMENT   Precautions:    0/10 Low back and R knee    X in shaded column indicates activity completed today   Modalities Parameters/  Location  Notes Manual Therapy Time/Technique  Notes                     Exercise/Intervention   Notes   Mariano; review of goals        Standing on airex: feet together x2 20 sec hold  No UE support ,CGA   Step stance 20sec 2x     NuStep x6 min  x Level 5, seat 8, arms 9, 91% after Nustep   Forward step ups; side step ups  x10    Bilaterally    Airex: heel to raises, marches x15 ea  x Hold mini squats due to increased pain   3 way hip, Hs curls x15 ea  x no Green band   Tandem stepping fwd/retro x2 laps ea  x    rocker board fwd/lat x15 ea  x 20 sec balance with 1UE support   wobbleboard 10x   CW/CCW, manual overpressure provided   Hurdles:   x2 ea. Green R<>L lead, reciprocal, side stepping    Stepping over 1 julio c  10x   Forward/retro, required 1UE support occasionally 2 with retro stepping over leading with L leg   Agility mat: forward step in each square; lateral step in each square; march in each square x2   Hand hold assist needed           // bars: forward and lateral walk with emphasis on taking longer, exaggerated steps  x3 ea.  x           Sit-stands  15x  x Without UE support. Increased pain in R knee 92%          NK table: flexion/extension x10 ea 5#  Bilaterally, cues for technique   BOSU lunges  x10      Seated ball squeezes and hip abd x15  x blue band   [employer] walking  ~350 ft.        6 min walk test  x2  x Without supplemental oxygen;  91-97%                   Specific Interventions Next Treatment: NuStep; airex balance exercises; Activity/Treatment Tolerance:  [x]  Patient tolerated treatment well  []  Patient limited by fatigue  []  Patient limited by pain   []  Patient limited by medical complications  []  Other:     Assessment:  O2 levels ranged from 90-97% during session. Doing 6 min walk at start and end of session now.       Body Structures/Functions/Activity Limitations: impaired activity tolerance, impaired balance, impaired coordination, impaired endurance, impaired safety awareness, impaired strength and pain  Prognosis: fair    GOALS:  Patient Goal: minimize fall risk    Short Term Goals:  Time Frame: 4 weeks  1. Sharon Goodwin will demonstrate a good ankle strategy to maintain standing balance with perturbations to her trunk. 2. Sharon Goodwin will be able to stand for 20 min at a time without needing a rest break allowing her to prepare meals and perform light chores without limitation. NOT MET-limited to 5 min before she has to sit due to back pain; uses a kitchen stool a lot  3. Alison's Tinetti score will improve to 19/28 indicating minimal fall risk. GOAL MET-improved to 22/28      Long Term Goals:  Time Frame: 8 weeks  1. Discharge with independent HEP ONGOING  2. Alison's Tinetti score will improve to >21/28 indicating no fall risk. GOAL MET; REVISE to improve Tinetti score to >26/28; NOT MET-improved to 26/28  3. Sharon Goodwin will be able to maintain her standing balance in all 4 conditions of the m-CTSIB, using an ankle strategy to maintain her balance. NOT MET-able to maintain standing balance in conditions 1-3; LOB after 6 sec in condition 4  4. NEW GOAL: Sahron Goodwin will demonstrate good abdominal stabilization with all transfers and dynamic gait activities to provide greater support to low back. IMPROVING   5. NEW GOAL: Sharon Goodwin will be able to complete 6 min walking test with rollator while maintaining oxygen levels 92% or above so she can go without supplemental oxygen. NOT MET-dropped to 90%       Patient Education:   [x]  HEP/Education Completed: ft placement with gait. , tighten abdominals with standing and walking,  Standing tall at wall for optimal O2 intake   [facility] Access Code:  []  No new Education completed  [x]  Reviewed Prior HEP      [x]  Patient verbalized and/or demonstrated understanding of education provided. []  Patient unable to verbalize and/or demonstrate understanding of education provided. Will continue education.   [x]  Barriers to learning: n/a    PLAN:  Treatment Recommendations: Strengthening, Range of Motion, Balance Training, Endurance Training, Gait Training, Stair Training, Neuromuscular Re-education, Manual Therapy - Soft Tissue Mobilization, Manual Therapy - Joint Manipulation, Pain Management, Home Exercise Program, Patient Education and Modalities    []  Plan of care initiated. Plan to see patient 2 times per week for 8 weeks to address the treatment planned outlined above.   []  Continue with current plan of care  [x]  Modify plan of care as follows: 2 time per week   []  Hold pending physician visit  []  Discharge    Time In 1255   Time Out 1340   Timed Code Minutes: 45 min   Total Treatment Time: 45 min       Electronically Signed by: Kwadwo Matos PPH MDD/Anxiety after death of  3 years ago. No hx of suicide attempts. No current treaters.

## 2022-01-14 ENCOUNTER — APPOINTMENT (OUTPATIENT)
Dept: GASTROENTEROLOGY | Facility: CLINIC | Age: 82
End: 2022-01-14

## 2022-01-31 NOTE — ED BEHAVIORAL HEALTH NOTE - BEHAVIORAL HEALTH NOTE
bell in reach, dizziness subsided, wife@bedside, and   Comment: VERONA Armstrong Worker called patient’s son Khoi Bautista ([phone]) for collateral information. All information is as per Mr. Bautista:    Patient is a 80 year old female, domiciled alone in a coop, with no past psychiatric hospitalizations, BIB accompanied by son and daughter in law for psychosis. According to patient’s son he states that he took the patient to see a geriatric doctor Dr. Villa yesterday and was informed that the patient does not have dementia. Today, patient’s son received a call from Evelyn Gabriel [phone]  a  at Dr. Villa's practice who encouraged patient to go to the hospital because of the patient experiencing psychotic symptoms. It was encouraged by the  for the patient to have a psychiatric assessment. Patient has been presenting for a couple weeks with paranoia of thinking that her food is poisoned and that her phones is tapped. Son reports that the patient was triggered by a letter that she received in the middle of October from her bank encouraging her to transition to online banking. Son states that from that letter the patient has been increasingly anxious and thinks that the bass are closing and she is being robbed money. Son states that the patient has been increasingly worsening and has been forgetful and more depressed. He states that the patient stayed by his home for three days last week so that he could keep an eye on the patient.  He states that yesterday the patient started to hit herself three times on the leg and said she wanted to hurt herself. He states that the patient also was making strange faces in the mirror. He states that the patient’s spouse passed away three years ago and since then she has taken it on. He states that the patient thinks that she is going to be broke and live on the street or that she will go to custodial. Son reports that the patient struggles chronically with insomnia and has been off her anxiety pills for 90 days and she weened herself off. He reports that the patient was being prescribed his medications from Dr. Trevino in [place]. Son states that the patient lost 12 pounds in two weeks. He states that she has been constipated and thinks that the food is poison and is afraid to eat. Patient also believes that her neighbors is poisoning her below her coop. He states that the patient is not aggressive or violent but has been randomly screaming and yelling. Son states that last week the patient fell by fainting and hit her head and had a cat scan done which came back clear. Son states that the patient was tested 6 times in the past for UTI but denies that she has a UTI now. He states that the patient is not prescribed any psychotropic medications. He states that the patient tested negative for COVID-19 a couple months ago and has not traveled out of state in the past two weeks. He denies that the patient was exposed to COVID-19. Patient has no access to guns. Patient sodium levels known to be low. Patient has no access to guns. Worker called patient’s son Khoi Bautista ([phone]) for collateral information. All information is as per Mr. Bautista:    Patient is a 80 year old female, domiciled alone in a coop, with no past psychiatric hospitalizations, BIB accompanied by son and daughter in law for psychosis. According to patient’s son he states that he took the patient to see a geriatric doctor Dr. Villa yesterday and was informed that the patient does not have dementia. Today, patient’s son received a call from Evelyn Gabriel [phone]  a  at Dr. Villa's practice who encouraged patient to go to the hospital because of the patient experiencing psychotic symptoms. It was encouraged by the  for the patient to have a psychiatric assessment. Patient has been presenting for a couple weeks with paranoia of thinking that her food is poisoned and that her phones is tapped. Son reports that the patient was triggered by a letter that she received in the middle of October from her bank encouraging her to transition to online banking. Son states that from that letter the patient has been increasingly anxious and thinks that the bass are closing and she is being robbed money. Son states that the patient has been increasingly worsening and has been forgetful and more depressed. He states that the patient stayed by his home for three days last week so that he could keep an eye on the patient.  He states that yesterday the patient started to hit herself three times on the leg and said she wanted to hurt herself. He states that the patient also was making strange faces in the mirror. He states that the patient’s spouse passed away three years ago and since then she has taken it on. He states that the patient thinks that she is going to be broke and live on the street or that she will go to care home. Son reports that the patient struggles chronically with insomnia and has been off her anxiety pills for 90 days and she weened herself off. He reports that the patient was being prescribed his medications from Dr. Trevino in [place]. Son states that the patient lost 12 pounds in two weeks. He states that she has been constipated and thinks that the food is poison and is afraid to eat. Patient also believes that her neighbors is poisoning her below her coop. He states that the patient is not aggressive or violent but has been randomly screaming and yelling. Son states that last week the patient fell by fainting and hit her head and had a cat scan done which came back clear. Son states that the patient was tested 6 times in the past for UTI but denies that she has a UTI now. He states that the patient is not prescribed any psychotropic medications. He states that the patient tested negative for COVID-19 a couple months ago and has not traveled out of state in the past two weeks. He denies that the patient was exposed to COVID-19. Patient has no access to guns. Patient sodium levels known to be low. Patient has no access to guns.    Patient is pending medical clearance. Team made aware to reach out to patient's son.

## 2022-02-17 NOTE — ED ADULT NURSE NOTE - PAIN: PRECIPITATING/AGGRAVATING FACTORS
eating Dutasteride Pregnancy And Lactation Text: This medication is absolutely contraindicated in women, especially during pregnancy and breast feeding. Feminization of male fetuses is possible if taking while pregnant.

## 2022-06-16 NOTE — BH INPATIENT PSYCHIATRY PROGRESS NOTE - NSBHCHARTREVIEWVS_PSY_A_CORE FT
Detail Level: Detailed Detail Level: Generalized Detail Level: Simple Detail Level: Zone Vital Signs Last 24 Hrs  T(C): 36.8 (20 Dec 2020 06:51), Max: 36.8 (20 Dec 2020 06:51)  T(F): 98.3 (20 Dec 2020 06:51), Max: 98.3 (20 Dec 2020 06:51)  HR: --  BP: --  BP(mean): --  RR: 16 (19 Dec 2020 10:43) (16 - 16)  SpO2: --

## 2022-06-23 NOTE — ED ADULT TRIAGE NOTE - ADDITIONAL SAFETY/BANDS...
José Burk   6/20/1980 was seen for Medical Nutrition Therapy with Diabetes:    6/23/2022  Referring Provider: Ernestine Crowley  Start time: 1:0 0 End time: 2:00    José Burk verbally consents to a telemedicine service with live, interactive video and audio on 6/23/2022. Patient understands and accepts financial responsibility for any deductible, co-insurance and/or co-pays associated with this service. HEMOGLOBIN A1C (%)   Date Value   06/21/2022 6.9 (A)     Although his A1C is 6.9%, he would like it closer to 6.5%. B (7/8 am): bagel or eggs and beans or eggs and chorizo or omelette with vegetables. May be breakfast bagel. Beverage: coffee    Sn: pickles or peanuts/almonds or chips    L (12/2 pm): cucumbers with lime if heavy breakfast or if had light breakfast will have [employer] sub (8 or 10\") with a bag of chips. Beverage: Dr Ha Dural or mineral water    Sn: same as morning snack    D (5/6 pm): stir martin (only small amount of rice), or chicken and broccolli or occasionally a steak. Occasionally spaghetti. 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 No exercise x 3 months. Has a stationary bike and barbells. Is motivated - gets on his stationary bike to bring BG's down when Angela spikes occasionally due to carb intake. Reviewed HgbA1C and target A1c levels. Reviewed target BG levels FBS  and pp <150 ideally.   Sensor type: Angela  CGM Analysis of data: dates  6/9-6/22/22   Average glucose : 157 mg/dl     Glucose Management Indicator (GMI): 7.1%  Glucose Variability (%CV target <36%): 20.1%    1% time above 250 mg/dl  (recommended: <5%)  20% time above 180mg /dl  (recommended: <25%)  79% time in target range:  mg/dl (recommended 70%)  0% time below target range: 70mg/dl (recommended <5%)  0% time below severe low less than 55 mg/dl (recommended <1%)    Plan: CGMS download--copy sent to scan for view in media tab. Reviewed Type 2 diabetes as a diagnosis. Discussed insulin resistance and tools to treat: diet, regular physical activity, diabetes medications, and stress management. Also discussed natural progression of Type 2 diabetes and ways to slow progression: regular physical activity, good blood glucose control and avoid weight gain/possibly reduce weight. Taught label reading: focus on counting grams of carb rather than looking at sugar. Reviewed current diabetes medications: Ozempic 1 mg weekly (fell off a little the past 3 months)  Metformin  mg 2 pills BID    Discussed Rule of 15 for treating hypoglycemia. Discussed macronutrient balance: reduce starch, include lean protein and non-starchy vegetables, also fruit, yogurt and milk using food models. Gave examples of heart healthy, balanced meals with 30-45 grams of carbohydrate/meal.     Reviewed principles for heart healthy diet: reduced saturated fat, reduced sodium and high fiber. Exercise: Discussed benefits of regular physical activity and goal to complete 30 minutes daily. Patient set diabetes self-management goals: watch starchy snacks. Patient is willing to make lifestyle changes to improve blood glucose control. Emailed materials provided for all topics covered. Patient verbalized understanding and has no further questions at this time. Thank you for the referral.  Follow-up plan: diabetes provider 9/21  Patient to contact diabetes center for questions/concerns.   Nicola Rodriguez MS, RD, VANDANA, LDN Additional Safety/Bands:

## 2022-11-30 NOTE — ED ADULT NURSE NOTE - NSSISCREENINGQ2_ED_A_ED
Spoke with patient. Conveyed results of positive Influenza A  Test.Educated patient to increase hydration, to control fever with Tylenol and Ibuprofen alternating per the directions on the back of the bottle. To quarantine if fever develops for 24 hours after fever is normal without the use of fever reducing medications. Should symptoms worsen or become severe to go to the emergency room.   Patient did not have further concerns or questions.  
No

## 2022-12-19 NOTE — H&P ADULT - PROBLEM SELECTOR PROBLEM 3
Pt is following w/ nephrologist.  ----- Message from Isa Rasheed MD sent at 12/7/2022 12:09 PM CST -----  Cr improved. Still above baseline. She needs to follow with local general nephrologist closely     ----- Message -----  From: Angelic Spence RN  Sent: 12/7/2022  12:08 PM CST  To: Isa Rasheed MD    Repeat labs from last week, creatinine improved.       Fall, initial encounter

## 2022-12-21 NOTE — ED ADULT NURSE NOTE - PSH
Daily Note     Today's date: 2022  Patient name: Matt Perkins  : 1952  MRN: [id number]  Referring provider: Vanessa Marquez MD  Dx:   Encounter Diagnosis     ICD-10-CM    1  Chronic bilateral low back pain without sciatica  M54 50     G89 29                      Subjective: Pt arrives denying any pain currently  Objective: See treatment diary below      Assessment: Tolerated treatment well  Patient demonstrated fatigue post treatment and would benefit from continued PT  Progressions made in increased reps in which pt is appropriately challenged and able to complete successfully  Decreased overall cueing required throughout session for proper form and core activation  Plan: Continue per plan of care  Progress treatment as tolerated         Precautions: hx spine surgery about 6 months ago (unsure what kind)      RE:   EPOC:          Manuals                                        Neuro Re-Ed                                                                        Ther Ex        Bike for ROM L1 x5'   5' L1 x 5 mins L1 x 5 min   TrA brace 10"x10   3"x20 5"x 10 10"x10    TrA brace + marches x10 ea LE     NV   HL hip add 10"x15   5"x30 5"x20 5"x30    HL hip abd GTB 5"x30 ea   GTB 5"x20 ea GTB 5"x30 GTB 5"x20ea   SLR 2x10 ea   2x10 ea 2x10 ea  2x10 ea   HS stretch W/ strap 30"x4   W/ strap 30"X4 W/Strap 30"x4  W/ strap 30"x4    Lumbar rollout    1 way 10"x10     Row/LPD BTB 3x10 ea   GTB 3x10 ea GTB 3x10 ea  BTB 2x10 ea    pallof press GTB 10"x10 ea   10"x10 ea 1 GTB x20 ea  1 GTB 5"x20    STS 2x10 lowmat   lowmat 2x10 Low mat 2x10 Low mat 2x10   Standing hip 3 way YTB x15 ea, ea LE   x10 ea, ea leg X10 ea BL X 10 ea bilat  YTB             Pt edu/HEP         Ther Activity                           Gait Training                           Modalities H/O abdominal hysterectomy    H/O tubal ligation

## 2023-01-01 NOTE — ED BEHAVIORAL HEALTH ASSESSMENT NOTE - EYE CONTACT
04/19/23 7928   Provider Notification   Provider Name/Title Dr. Finnegan   Method of Notification Phone   Request Evaluate-Remote   Notification Reason Lab Results  (Jittery - OT 52)   Dr. Finnegan notified infant was looking jittery and OT result was 52. MD okay with result at this time, unless infant begins to increase in jitteriness, has more than one poor feedings, or has change in vitals, specifically change in temperature.    Good

## 2023-05-09 NOTE — BH PATIENT PROFILE - PASTORAL.
chaplaincy/clergy/ Adbry Pregnancy And Lactation Text: It is unknown if this medication will adversely affect pregnancy or breast feeding.

## 2023-05-23 NOTE — PATIENT PROFILE ADULT - HAS THE PATIENT RECEIVED THE INFLUENZA VACCINE THIS SEASON?
Anesthesia Post-op Note    Patient: Judy Jones  Procedure(s) Performed: EGD (ESOPHAGOGASTRODUODENOSCOPY)  Anesthesia type: MAC    Vitals Value Taken Time   Temp 36.8 °C (98.2 °F) 05/22/23 1520   Pulse 84 05/22/23 1540   Resp 16 05/22/23 1540   SpO2 95 % 05/22/23 1540   /71 05/22/23 1540         Patient Location: Phase II  Post-op Vital Signs:stable  Level of Consciousness: awake and alert  Respiratory Status: spontaneous ventilation  Cardiovascular stable  Hydration: euvolemic  Pain Management: adequately controlled  Handoff: Handoff to receiving clinician was performed and questions were answered  Vomiting: none  Nausea: None  Airway Patency:patent  Post-op Assessment: no complications and patient tolerated procedure well with no complications      No notable events documented.   no...

## 2023-07-11 ENCOUNTER — NON-APPOINTMENT (OUTPATIENT)
Age: 83
End: 2023-07-11

## 2023-07-11 ENCOUNTER — APPOINTMENT (OUTPATIENT)
Dept: INTERNAL MEDICINE | Facility: CLINIC | Age: 83
End: 2023-07-11
Payer: MEDICARE

## 2023-07-11 VITALS
OXYGEN SATURATION: 96 % | WEIGHT: 132 LBS | HEART RATE: 100 BPM | BODY MASS INDEX: 24.29 KG/M2 | HEIGHT: 62 IN | RESPIRATION RATE: 18 BRPM | TEMPERATURE: 98.2 F | SYSTOLIC BLOOD PRESSURE: 140 MMHG | DIASTOLIC BLOOD PRESSURE: 90 MMHG

## 2023-07-11 VITALS — SYSTOLIC BLOOD PRESSURE: 128 MMHG | DIASTOLIC BLOOD PRESSURE: 70 MMHG

## 2023-07-11 DIAGNOSIS — F29 UNSPECIFIED PSYCHOSIS NOT DUE TO A SUBSTANCE OR KNOWN PHYSIOLOGICAL CONDITION: ICD-10-CM

## 2023-07-11 DIAGNOSIS — R26.9 UNSPECIFIED ABNORMALITIES OF GAIT AND MOBILITY: ICD-10-CM

## 2023-07-11 DIAGNOSIS — R20.0 ANESTHESIA OF SKIN: ICD-10-CM

## 2023-07-11 DIAGNOSIS — F41.9 ANXIETY DISORDER, UNSPECIFIED: ICD-10-CM

## 2023-07-11 DIAGNOSIS — I10 ESSENTIAL (PRIMARY) HYPERTENSION: ICD-10-CM

## 2023-07-11 DIAGNOSIS — Z13.228 ENCOUNTER FOR SCREENING FOR OTHER METABOLIC DISORDERS: ICD-10-CM

## 2023-07-11 DIAGNOSIS — R94.31 ABNORMAL ELECTROCARDIOGRAM [ECG] [EKG]: ICD-10-CM

## 2023-07-11 DIAGNOSIS — Z12.11 ENCOUNTER FOR SCREENING FOR MALIGNANT NEOPLASM OF COLON: ICD-10-CM

## 2023-07-11 DIAGNOSIS — F32.A ANXIETY DISORDER, UNSPECIFIED: ICD-10-CM

## 2023-07-11 DIAGNOSIS — R13.10 DYSPHAGIA, UNSPECIFIED: ICD-10-CM

## 2023-07-11 PROCEDURE — 93000 ELECTROCARDIOGRAM COMPLETE: CPT

## 2023-07-11 PROCEDURE — 99204 OFFICE O/P NEW MOD 45 MIN: CPT | Mod: 25

## 2023-07-11 RX ORDER — RISPERIDONE 4 MG/1
4 TABLET, FILM COATED ORAL DAILY
Qty: 1 | Refills: 0 | Status: ACTIVE | COMMUNITY
Start: 2023-07-11

## 2023-07-11 RX ORDER — AMLODIPINE BESYLATE 2.5 MG/1
2.5 TABLET ORAL
Qty: 90 | Refills: 0 | Status: DISCONTINUED | COMMUNITY
Start: 2017-09-27 | End: 2023-07-11

## 2023-07-11 NOTE — PHYSICAL EXAM
[No Acute Distress] : no acute distress [Well Nourished] : well nourished [Well Developed] : well developed [Normal Sclera/Conjunctiva] : normal sclera/conjunctiva [Normal Outer Ear/Nose] : the outer ears and nose were normal in appearance [Normal Oropharynx] : the oropharynx was normal [No JVD] : no jugular venous distention [No Lymphadenopathy] : no lymphadenopathy [Supple] : supple [No Respiratory Distress] : no respiratory distress  [No Accessory Muscle Use] : no accessory muscle use [Clear to Auscultation] : lungs were clear to auscultation bilaterally [Normal Rate] : normal rate  [Regular Rhythm] : with a regular rhythm [Normal S1, S2] : normal S1 and S2 [No Murmur] : no murmur heard [No Carotid Bruits] : no carotid bruits [No Varicosities] : no varicosities [Pedal Pulses Present] : the pedal pulses are present [No Edema] : there was no peripheral edema [No Extremity Clubbing/Cyanosis] : no extremity clubbing/cyanosis [Soft] : abdomen soft [Non Tender] : non-tender [Non-distended] : non-distended [Normal Bowel Sounds] : normal bowel sounds [Normal Anterior Cervical Nodes] : no anterior cervical lymphadenopathy [No CVA Tenderness] : no CVA  tenderness [No Spinal Tenderness] : no spinal tenderness [No Joint Swelling] : no joint swelling [Grossly Normal Strength/Tone] : grossly normal strength/tone [No Rash] : no rash [Coordination Grossly Intact] : coordination grossly intact [No Focal Deficits] : no focal deficits [Normal Gait] : normal gait [Alert and Oriented x3] : oriented to person, place, and time

## 2023-07-12 DIAGNOSIS — E55.9 VITAMIN D DEFICIENCY, UNSPECIFIED: ICD-10-CM

## 2023-07-12 DIAGNOSIS — E78.1 PURE HYPERGLYCERIDEMIA: ICD-10-CM

## 2023-07-12 DIAGNOSIS — R82.90 UNSPECIFIED ABNORMAL FINDINGS IN URINE: ICD-10-CM

## 2023-07-12 LAB
25(OH)D3 SERPL-MCNC: 23.1 NG/ML
ALBUMIN SERPL ELPH-MCNC: 4.4 G/DL
ALP BLD-CCNC: 59 U/L
ALT SERPL-CCNC: 15 U/L
ANION GAP SERPL CALC-SCNC: 16 MMOL/L
APPEARANCE: ABNORMAL
AST SERPL-CCNC: 17 U/L
BACTERIA: ABNORMAL /HPF
BILIRUB SERPL-MCNC: 0.2 MG/DL
BILIRUBIN URINE: NEGATIVE
BLOOD URINE: NEGATIVE
BUN SERPL-MCNC: 14 MG/DL
CALCIUM SERPL-MCNC: 9.7 MG/DL
CAST: 0 /LPF
CHLORIDE SERPL-SCNC: 100 MMOL/L
CHOLEST SERPL-MCNC: 229 MG/DL
CK SERPL-CCNC: 30 U/L
CO2 SERPL-SCNC: 21 MMOL/L
COLOR: YELLOW
CREAT SERPL-MCNC: 0.81 MG/DL
CREAT SPEC-SCNC: 95 MG/DL
EGFR: 73 ML/MIN/1.73M2
EPITHELIAL CELLS: 9 /HPF
ESTIMATED AVERAGE GLUCOSE: 123 MG/DL
FERRITIN SERPL-MCNC: 89 NG/ML
FOLATE SERPL-MCNC: 18.7 NG/ML
GLUCOSE QUALITATIVE U: NEGATIVE MG/DL
GLUCOSE SERPL-MCNC: 93 MG/DL
HBA1C MFR BLD HPLC: 5.9 %
HDLC SERPL-MCNC: 40 MG/DL
IRON SATN MFR SERPL: 15 %
IRON SERPL-MCNC: 50 UG/DL
KETONES URINE: NEGATIVE MG/DL
LDLC SERPL CALC-MCNC: 101 MG/DL
LEUKOCYTE ESTERASE URINE: ABNORMAL
MICROALBUMIN 24H UR DL<=1MG/L-MCNC: 1.5 MG/DL
MICROALBUMIN/CREAT 24H UR-RTO: 16 MG/G
MICROSCOPIC-UA: NORMAL
NITRITE URINE: NEGATIVE
NONHDLC SERPL-MCNC: 189 MG/DL
PH URINE: 6
POTASSIUM SERPL-SCNC: 3.9 MMOL/L
PROT SERPL-MCNC: 7.3 G/DL
PROTEIN URINE: NEGATIVE MG/DL
PSA SERPL-MCNC: <0.01 NG/ML
RED BLOOD CELLS URINE: 3 /HPF
SODIUM SERPL-SCNC: 137 MMOL/L
SPECIFIC GRAVITY URINE: 1.02
T4 FREE SERPL-MCNC: 1.1 NG/DL
TIBC SERPL-MCNC: 341 UG/DL
TRIGL SERPL-MCNC: 521 MG/DL
TSH SERPL-ACNC: 1.58 UIU/ML
UIBC SERPL-MCNC: 291 UG/DL
URATE SERPL-MCNC: 4.9 MG/DL
UROBILINOGEN URINE: 0.2 MG/DL
VIT B12 SERPL-MCNC: 858 PG/ML
WHITE BLOOD CELLS URINE: 18 /HPF

## 2023-07-12 RX ORDER — UBIDECARENONE/VIT E ACET 100MG-5
25 MCG CAPSULE ORAL
Qty: 90 | Refills: 1 | Status: ACTIVE | COMMUNITY
Start: 2023-07-12 | End: 1900-01-01

## 2023-07-12 RX ORDER — LORAZEPAM 1 MG/1
1 TABLET ORAL
Qty: 90 | Refills: 0 | Status: DISCONTINUED | COMMUNITY
Start: 2023-01-23

## 2023-07-12 RX ORDER — OMEGA-3/DHA/EPA/FISH OIL 300-1000MG
1000 CAPSULE ORAL
Qty: 90 | Refills: 1 | Status: ACTIVE | COMMUNITY
Start: 2023-07-12 | End: 1900-01-01

## 2023-07-13 PROBLEM — R94.31 ABNORMAL EKG: Status: ACTIVE | Noted: 2020-06-02

## 2023-07-13 PROBLEM — R13.10 DIFFICULTY SWALLOWING: Status: ACTIVE | Noted: 2023-07-11

## 2023-07-13 PROBLEM — Z13.228 SCREENING FOR METABOLIC DISORDER: Status: ACTIVE | Noted: 2020-06-02

## 2023-07-13 PROBLEM — F41.9 ANXIETY AND DEPRESSION: Status: ACTIVE | Noted: 2020-06-02

## 2023-07-13 PROBLEM — I10 HTN (HYPERTENSION): Status: ACTIVE | Noted: 2017-10-05

## 2023-07-13 PROBLEM — R26.9 IMPAIRED GAIT: Status: ACTIVE | Noted: 2023-07-11

## 2023-07-13 PROBLEM — F29 PSYCHOSIS: Status: ACTIVE | Noted: 2020-12-10

## 2023-07-13 PROBLEM — Z12.11 SCREENING FOR COLON CANCER: Status: ACTIVE | Noted: 2020-06-02

## 2023-07-13 PROBLEM — R20.0 NUMBNESS OF FOOT: Status: ACTIVE | Noted: 2020-06-02

## 2023-07-13 NOTE — REVIEW OF SYSTEMS
[Abdominal Pain] : abdominal pain [Negative] : Heme/Lymph [Anxiety] : anxiety [Lower Ext Edema] : no lower extremity edema [Back Pain] : no back pain [de-identified] : jessica

## 2023-07-13 NOTE — HISTORY OF PRESENT ILLNESS
[Other: _____] : [unfilled] [FreeTextEntry1] : annual physical [de-identified] : Larissa is a 81yo female with PMH of Insomnia, anxiety, HTN, suspected dementia with paranoid/agitation who is brought in by ELVA presents here today for multiple complaints. Per ELVA, she is brining her in for general checkup as its been  3 years. Pt has been getting ongoing paranoid thoughts but he past few years and has been on meds and follows with psych and neuro. ELVA says when she doesn’t take meds her paranoia gets worse and when she takes she is better controlled.  Pt c/o of problems walking and worsening balance issues. She also c/o of occasional swallowing problems that last occurred last week.. She had an upper endoscopy a few years ago but not seen GI since. \par Denies dysuria, urinary frequency,  urgency, hematuria.\par Denies f/c, abd pain, diarrhea\par Denies chest pain, SOB, STEVE, dizziness, diaphoresis, palpitations, LE swelling, orthopnea, syncope, n/v, headache,\par Denies any falls or head trauma. \par

## 2023-07-13 NOTE — HEALTH RISK ASSESSMENT
[0] : 2) Feeling down, depressed, or hopeless: Not at all (0) [PHQ-2 Negative - No further assessment needed] : PHQ-2 Negative - No further assessment needed [Never] : Never [[id number]] : 0

## 2023-07-14 LAB — BACTERIA UR CULT: NORMAL

## 2023-07-31 NOTE — DISCHARGE NOTE PROVIDER - NSDCHC_MEDRECSTATUS_GEN_ALL_CORE
[FreeTextEntry1] : If you feel like you have an infection it is important for you to call our office and we will arrange testing of your urine.  Please continue taking Gemtesa 75 mg for frequency. Refills sent to your pharmacy.  Please begin taking Vesicare 10 mg at bedtime. It takes up to 6 weeks to go into full effect. Please  your refill when you complete the 1st bottle.  I sent refills of Biotene mouthwash.   Please call my office if you have any issues with the cost or side effects of the medication.   Schedule a 6-8 week follow up med check/PVR check appointment with SILVESTRE Alcantara or SILVESTRE Altamirano. If you decide that you would like the pacemaker in the back, please schedule surgical counseling visit with Dr. Mitchell   Admission Reconciliation is Not Complete  Discharge Reconciliation is Not Complete Admission Reconciliation is Completed  Discharge Reconciliation is Completed

## 2023-11-08 ENCOUNTER — APPOINTMENT (OUTPATIENT)
Dept: INTERNAL MEDICINE | Facility: CLINIC | Age: 83
End: 2023-11-08

## 2023-12-04 NOTE — BH SCALES AND SCREENS - NSBPRSUNUTHOCON_PSY_ALL_CORE
Completed form mailed to [address]  [facility] [address]    
5 = Moderately Severe – full delusional conviction, but delusion(s) has only occasional impact on behavior
5 = Moderately Severe – full delusional conviction, but delusion(s) has only occasional impact on behavior

## 2023-12-21 ENCOUNTER — APPOINTMENT (OUTPATIENT)
Dept: CARDIOLOGY | Facility: CLINIC | Age: 83
End: 2023-12-21

## 2023-12-26 NOTE — ED ADULT NURSE NOTE - NSIMPLEMENTINTERV_GEN_ALL_ED
English Implemented All Universal Safety Interventions:  [place] to call system. Call bell, personal items and telephone within reach. Instruct patient to call for assistance. Room bathroom lighting operational. Non-slip footwear when patient is off stretcher. Physically safe environment: no spills, clutter or unnecessary equipment. Stretcher in lowest position, wheels locked, appropriate side rails in place.

## 2024-02-01 NOTE — PATIENT PROFILE ADULT - SURGICAL SITE INCISION
Progress note: Infectious diseases    Patient - Carmen Howard,  Age - 85 y.o.    - 1938      Room Number - 5K-22/022-A   MRN -  [id number]   [facility] # - [id number]  Date of Admission -  2024  2:27 PM    SUBJECTIVE:   No new issues.  OBJECTIVE   VITALS    height is 1.651 m (5' 5\") and weight is 65.8 kg (145 lb 1 oz). Her oral temperature is 98.8 °F (37.1 °C). Her blood pressure is 168/70 (abnormal) and her pulse is 89. Her respiration is 18 and oxygen saturation is 94%.       Wt Readings from Last 3 Encounters:   24 65.8 kg (145 lb 1 oz)   10/06/23 66.7 kg (147 lb)   23 62.1 kg (137 lb)       I/O (24 Hours)    Intake/Output Summary (Last 24 hours) at 2024 1059  Last data filed at 2024 0934  Gross per 24 hour   Intake 1829.62 ml   Output --   Net 1829.62 ml         General Appearance  Awake, alert, oriented,  not  In acute distress  HEENT - normocephalic, atraumatic, pink conjunctiva,  anicteric sclera  Neck - Supple, no mass  Lungs -  Bilateral  air entry, no rhonchi, no wheeze  Cardiovascular - Heart sounds are normal.     Abdomen - soft, not distended, nontender,   Neurologic -awake and answers appropriately  Skin - No bruising or bleeding  Extremities - + edema, has scabbed wound on the left lower leg    MEDICATIONS:      vancomycin  1,000 mg IntraVENous Q24H    piperacillin-tazobactam  3,375 mg IntraVENous Q8H    apixaban  5 mg Oral BID    bumetanide  0.5 mg Oral Daily    carbidopa-levodopa  2 tablet Oral TID    donepezil  10 mg Oral Nightly    lisinopril  20 mg Oral Daily    metoprolol tartrate  12.5 mg Oral BID    predniSONE  1 mg Oral Daily    sertraline  50 mg Oral Daily    atorvastatin  20 mg Oral Daily    traZODone  50 mg Oral Nightly    vitamin B-12  1,000 mcg Oral Daily    mometasone-formoterol  2 puff Inhalation BID RT    tiotropium  2 puff Inhalation Daily RT    vancomycin  (VANCOCIN) intermittent dosing (placeholder)   Other RX Placeholder    albuterol  2.5 mg Nebulization BID RT    sodium chloride flush  5-40 mL IntraVENous 2 times per day      sodium chloride       melatonin, albuterol, sodium chloride flush, sodium chloride, potassium chloride **OR** potassium alternative oral replacement **OR** potassium chloride, magnesium sulfate, ondansetron **OR** ondansetron, polyethylene glycol, acetaminophen **OR** acetaminophen      LABS:     CBC:   Recent Labs     01/29/24  1446 01/30/24  0545 02/01/24  0543   WBC 18.4* 12.1* 9.8   HGB 9.6* 8.9* 8.7*    380 400       BMP:    Recent Labs     01/29/24  1446 01/30/24  0545 02/01/24  0543    137 143   K 4.8 3.7 3.5   CL 96* 101 109   CO2 30 24 21*   BUN 37* 25* 15   CREATININE 1.3* 1.0 0.9   GLUCOSE 233* 126* 127*       Calcium:  Recent Labs     02/01/24  0543   CALCIUM 8.4*        Recent Labs     01/29/24  1432   POCGLU 275*       HgbA1C: No results for input(s): \"LABA1C\" in the last 72 hours.  INR: No results for input(s): \"INR\" in the last 72 hours.  Hepatic:   Recent Labs     01/29/24  1446   ALKPHOS 65   ALT <5*   AST <5   PROT 6.3   BILITOT 0.5   LABALBU 3.3*          CULTURES:   UA:   Recent Labs     01/29/24  1455   PHUR 7.0   COLORU YELLOW   PROTEINU NEGATIVE   BLOODU TRACE*   RBCUA 5-10   WBCUA 0-2   BACTERIA NONE SEEN   NITRU NEGATIVE   GLUCOSEU NEGATIVE   BILIRUBINUR NEGATIVE   UROBILINOGEN 0.2   KETUA NEGATIVE       Micro:   Lab Results   Component Value Date/Time    BC No growth 24 hours. No growth 48 hours. 01/29/2024 03:11 PM          Problem list of patient:     Patient Active Problem List   Diagnosis Code    Nonexudative age-related macular degeneration H35.3190    Nuclear sclerotic cataract H25.10    History of ulcerative colitis Z87.19    Sleep apnea G47.30    Postmenopausal Z78.0    Nocturnal hypoxemia G47.34    Nicotine dependence F17.200    Hyperlipidemia E78.5    Diverticulosis large intestine w/o  no
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
## 2024-08-11 NOTE — DISCHARGE NOTE PROVIDER - CARE PROVIDERS DIRECT ADDRESSES
Pt comfort check. Pt watching tv. Pt ate 80% of breakfast that was provided. Pt denies needs at this time. Pt call light w/I reach. Nad pt laying on right side   ,DirectAddress_Unknown

## 2024-10-02 ENCOUNTER — TRANSCRIPTION ENCOUNTER (OUTPATIENT)
Age: 84
End: 2024-10-02

## 2024-11-21 NOTE — BH PATIENT PROFILE - NSPROGENOTHERPROVIDER_GEN_A_NUR
Duration Of Freeze Thaw-Cycle (Seconds): 0 Post-Care Instructions: I reviewed with the patient in detail post-care instructions. Patient is to wear sunprotection, and avoid picking at any of the treated lesions. Pt may apply Vaseline to crusted or scabbing areas. Show Applicator Variable?: Yes Consent: The patient's consent was obtained including but not limited to risks of crusting, scabbing, blistering, scarring, darker or lighter pigmentary change, recurrence, incomplete removal and infection. Render Note In Bullet Format When Appropriate: No Detail Level: Detailed none

## 2025-05-15 NOTE — BH INPATIENT PSYCHIATRY PROGRESS NOTE - NSBHASSESSSUMMFT_PSY_ALL_CORE
Price (Do Not Change): 0.00 Detail Level: Simple Pt remains depressed, +paranoid delusions, hopeful that she will start feeling better soon.  Continue mirtazapine and risperidone titrations

## 2025-06-01 NOTE — DISCHARGE NOTE NURSING/CASE MANAGEMENT/SOCIAL WORK - NSDCPETBCESMAN_GEN_ALL_CORE
If you are a smoker, it is important for your health to stop smoking. Please be aware that second hand smoke is also harmful.
Kira

## 2025-06-16 NOTE — BH CONSULTATION LIAISON PROGRESS NOTE - NSBHFUPINTERVALHXFT_PSY_A_CORE
Writer placed call to patient to inform them that they are not due for refill of lisinopril. Medication was last sent in on 4/1/25 for 90 tablets with 1 refills. Writer left message with the above information.   Patient seen and evaluated, staff consulted, chart, labs, meds reviewed. No acute issues overnight, pt refusing blood work and PT this morning. Patient reports no changes in mood (irritable/sad), appetite (poor), and sleep (adequate).     Patient reports that she needs to sell her house for financial and emotional reasons, says that she has been eating less lately due to constipation and feeling bloated/full. She says that her recent falls are due to not eating enough. Additionally reports belief that her neighbors are poisoning her food, as well as hospital staff poisoning her food here in the hospital, with tasteless granular substance, for unknown reason. Patient says this paranoid delusion (causing significant distress and anxiety) began two months ago, with worsening anxiety over the last two years. She says she wants to go home and asks repeatedly whether anyone can find her son's phone number; able to reason with writer that she needs to be in hospital but quickly again asks to leave.    Intermittent passive SI (don't want to do this anymore/go on like this), no active SI, no intent or plan, no HI, no acute safety concerns. Actively participates in treatment and treatment planning. Direction of care discussed with the patient. Counseling and support provided. Patient seen and evaluated, staff consulted, chart, labs, meds reviewed. No acute issues overnight, pt refusing blood work and PT this morning. Patient reports no changes in mood (irritable/sad), appetite (poor), and sleep (adequate).     Patient  was noted to have a disorganized , illogical thought process. She reported feeling stressed as she needed to  sell her house and car for financial and emotional reasons, says that she has been eating less lately due to constipation and feeling bloated/full. She says that her recent falls are due to not eating enough. Additionally reports belief that her neighbors are poisoning her food, as well as hospital staff poisoning her food here in the hospital, with tasteless granular substance, for unknown reason. Patient says this paranoid delusion (causing significant distress and anxiety) began almost a year  ago,when new neighbor moved in,  with worsening anxiety over the last two years. She says she wants to go home and asks repeatedly whether anyone can find her son's phone number; able to reason with writer that she needs to be in hospital but quickly again asks to leave.    Intermittent passive SI (don't want to do this anymore/go on like this), no active SI, no intent or plan, no HI, no acute safety concerns. Actively participates in treatment and treatment planning. Direction of care discussed with the patient. Counseling and support provided.

## 2025-08-28 ENCOUNTER — EMERGENCY (EMERGENCY)
Facility: HOSPITAL | Age: 85
LOS: 0 days | Discharge: HOME | End: 2025-08-28
Attending: EMERGENCY MEDICINE
Payer: MEDICARE

## 2025-08-28 VITALS
WEIGHT: 128.09 LBS | OXYGEN SATURATION: 93 % | RESPIRATION RATE: 18 BRPM | SYSTOLIC BLOOD PRESSURE: 124 MMHG | HEART RATE: 74 BPM | HEIGHT: 62 IN | DIASTOLIC BLOOD PRESSURE: 72 MMHG | TEMPERATURE: 99 F

## 2025-08-28 DIAGNOSIS — Z90.710 ACQUIRED ABSENCE OF BOTH CERVIX AND UTERUS: Chronic | ICD-10-CM

## 2025-08-28 DIAGNOSIS — R09.89 OTHER SPECIFIED SYMPTOMS AND SIGNS INVOLVING THE CIRCULATORY AND RESPIRATORY SYSTEMS: ICD-10-CM

## 2025-08-28 DIAGNOSIS — Z88.2 ALLERGY STATUS TO SULFONAMIDES: ICD-10-CM

## 2025-08-28 DIAGNOSIS — U07.1 COVID-19: ICD-10-CM

## 2025-08-28 PROCEDURE — 71045 X-RAY EXAM CHEST 1 VIEW: CPT | Mod: 26

## 2025-08-28 PROCEDURE — 99284 EMERGENCY DEPT VISIT MOD MDM: CPT
